# Patient Record
Sex: FEMALE | Race: WHITE | NOT HISPANIC OR LATINO | URBAN - METROPOLITAN AREA
[De-identification: names, ages, dates, MRNs, and addresses within clinical notes are randomized per-mention and may not be internally consistent; named-entity substitution may affect disease eponyms.]

---

## 2018-02-07 ENCOUNTER — INPATIENT (INPATIENT)
Facility: HOSPITAL | Age: 83
LOS: 1 days | Discharge: HOME HEALTH | DRG: 149 | End: 2018-02-09
Attending: INTERNAL MEDICINE | Admitting: INTERNAL MEDICINE
Payer: MEDICARE

## 2018-02-07 VITALS
OXYGEN SATURATION: 98 % | TEMPERATURE: 99 F | HEART RATE: 81 BPM | SYSTOLIC BLOOD PRESSURE: 148 MMHG | RESPIRATION RATE: 19 BRPM | DIASTOLIC BLOOD PRESSURE: 74 MMHG

## 2018-02-07 DIAGNOSIS — R42 DIZZINESS AND GIDDINESS: ICD-10-CM

## 2018-02-07 DIAGNOSIS — Z96.60 PRESENCE OF UNSPECIFIED ORTHOPEDIC JOINT IMPLANT: Chronic | ICD-10-CM

## 2018-02-07 DIAGNOSIS — Z96.652 PRESENCE OF LEFT ARTIFICIAL KNEE JOINT: Chronic | ICD-10-CM

## 2018-02-07 LAB
ALBUMIN SERPL ELPH-MCNC: 3.7 G/DL — SIGNIFICANT CHANGE UP (ref 3.3–5)
ALP SERPL-CCNC: 81 U/L — SIGNIFICANT CHANGE UP (ref 40–120)
ALT FLD-CCNC: 12 U/L RC — SIGNIFICANT CHANGE UP (ref 10–45)
ANION GAP SERPL CALC-SCNC: 11 MMOL/L — SIGNIFICANT CHANGE UP (ref 5–17)
APPEARANCE UR: CLEAR — SIGNIFICANT CHANGE UP
AST SERPL-CCNC: 25 U/L — SIGNIFICANT CHANGE UP (ref 10–40)
BILIRUB SERPL-MCNC: 0.4 MG/DL — SIGNIFICANT CHANGE UP (ref 0.2–1.2)
BILIRUB UR-MCNC: NEGATIVE — SIGNIFICANT CHANGE UP
BUN SERPL-MCNC: 11 MG/DL — SIGNIFICANT CHANGE UP (ref 7–23)
CALCIUM SERPL-MCNC: 8.9 MG/DL — SIGNIFICANT CHANGE UP (ref 8.4–10.5)
CHLORIDE SERPL-SCNC: 102 MMOL/L — SIGNIFICANT CHANGE UP (ref 96–108)
CO2 SERPL-SCNC: 25 MMOL/L — SIGNIFICANT CHANGE UP (ref 22–31)
COLOR SPEC: SIGNIFICANT CHANGE UP
CREAT SERPL-MCNC: 0.67 MG/DL — SIGNIFICANT CHANGE UP (ref 0.5–1.3)
DIFF PNL FLD: NEGATIVE — SIGNIFICANT CHANGE UP
GAS PNL BLDV: SIGNIFICANT CHANGE UP
GLUCOSE SERPL-MCNC: 121 MG/DL — HIGH (ref 70–99)
GLUCOSE UR QL: NEGATIVE — SIGNIFICANT CHANGE UP
HCT VFR BLD CALC: 44 % — SIGNIFICANT CHANGE UP (ref 34.5–45)
HGB BLD-MCNC: 14.8 G/DL — SIGNIFICANT CHANGE UP (ref 11.5–15.5)
KETONES UR-MCNC: NEGATIVE — SIGNIFICANT CHANGE UP
LEUKOCYTE ESTERASE UR-ACNC: NEGATIVE — SIGNIFICANT CHANGE UP
LIDOCAIN IGE QN: 25 U/L — SIGNIFICANT CHANGE UP (ref 7–60)
MCHC RBC-ENTMCNC: 29.3 PG — SIGNIFICANT CHANGE UP (ref 27–34)
MCHC RBC-ENTMCNC: 33.6 GM/DL — SIGNIFICANT CHANGE UP (ref 32–36)
MCV RBC AUTO: 87.2 FL — SIGNIFICANT CHANGE UP (ref 80–100)
NITRITE UR-MCNC: NEGATIVE — SIGNIFICANT CHANGE UP
PH UR: 6 — SIGNIFICANT CHANGE UP (ref 5–8)
PLATELET # BLD AUTO: 174 K/UL — SIGNIFICANT CHANGE UP (ref 150–400)
POTASSIUM SERPL-MCNC: 4.3 MMOL/L — SIGNIFICANT CHANGE UP (ref 3.5–5.3)
POTASSIUM SERPL-SCNC: 4.3 MMOL/L — SIGNIFICANT CHANGE UP (ref 3.5–5.3)
PROT SERPL-MCNC: 7.8 G/DL — SIGNIFICANT CHANGE UP (ref 6–8.3)
PROT UR-MCNC: NEGATIVE — SIGNIFICANT CHANGE UP
RBC # BLD: 5.05 M/UL — SIGNIFICANT CHANGE UP (ref 3.8–5.2)
RBC # FLD: 12.8 % — SIGNIFICANT CHANGE UP (ref 10.3–14.5)
SODIUM SERPL-SCNC: 138 MMOL/L — SIGNIFICANT CHANGE UP (ref 135–145)
SP GR SPEC: 1.01 — LOW (ref 1.01–1.02)
UROBILINOGEN FLD QL: NEGATIVE — SIGNIFICANT CHANGE UP
WBC # BLD: 3.7 K/UL — LOW (ref 3.8–10.5)
WBC # FLD AUTO: 3.7 K/UL — LOW (ref 3.8–10.5)

## 2018-02-07 PROCEDURE — 99223 1ST HOSP IP/OBS HIGH 75: CPT | Mod: AI

## 2018-02-07 PROCEDURE — 99285 EMERGENCY DEPT VISIT HI MDM: CPT | Mod: GC

## 2018-02-07 RX ORDER — ONDANSETRON 8 MG/1
4 TABLET, FILM COATED ORAL ONCE
Qty: 0 | Refills: 0 | Status: COMPLETED | OUTPATIENT
Start: 2018-02-07 | End: 2018-02-07

## 2018-02-07 RX ORDER — LEVOTHYROXINE SODIUM 125 MCG
75 TABLET ORAL DAILY
Qty: 0 | Refills: 0 | Status: DISCONTINUED | OUTPATIENT
Start: 2018-02-07 | End: 2018-02-09

## 2018-02-07 RX ORDER — MECLIZINE HCL 12.5 MG
25 TABLET ORAL THREE TIMES A DAY
Qty: 0 | Refills: 0 | Status: DISCONTINUED | OUTPATIENT
Start: 2018-02-07 | End: 2018-02-09

## 2018-02-07 RX ORDER — SODIUM CHLORIDE 9 MG/ML
1000 INJECTION INTRAMUSCULAR; INTRAVENOUS; SUBCUTANEOUS ONCE
Qty: 0 | Refills: 0 | Status: COMPLETED | OUTPATIENT
Start: 2018-02-07 | End: 2018-02-07

## 2018-02-07 RX ORDER — ACETAMINOPHEN 500 MG
500 TABLET ORAL ONCE
Qty: 0 | Refills: 0 | Status: COMPLETED | OUTPATIENT
Start: 2018-02-07 | End: 2018-02-07

## 2018-02-07 RX ORDER — MECLIZINE HCL 12.5 MG
25 TABLET ORAL ONCE
Qty: 0 | Refills: 0 | Status: COMPLETED | OUTPATIENT
Start: 2018-02-07 | End: 2018-02-07

## 2018-02-07 RX ORDER — HEPARIN SODIUM 5000 [USP'U]/ML
5000 INJECTION INTRAVENOUS; SUBCUTANEOUS EVERY 8 HOURS
Qty: 0 | Refills: 0 | Status: DISCONTINUED | OUTPATIENT
Start: 2018-02-07 | End: 2018-02-09

## 2018-02-07 RX ADMIN — ONDANSETRON 4 MILLIGRAM(S): 8 TABLET, FILM COATED ORAL at 17:53

## 2018-02-07 RX ADMIN — HEPARIN SODIUM 5000 UNIT(S): 5000 INJECTION INTRAVENOUS; SUBCUTANEOUS at 22:47

## 2018-02-07 RX ADMIN — Medication 500 MILLIGRAM(S): at 20:16

## 2018-02-07 RX ADMIN — Medication 25 MILLIGRAM(S): at 22:47

## 2018-02-07 RX ADMIN — Medication 25 MILLIGRAM(S): at 17:53

## 2018-02-07 RX ADMIN — SODIUM CHLORIDE 1000 MILLILITER(S): 9 INJECTION INTRAMUSCULAR; INTRAVENOUS; SUBCUTANEOUS at 18:01

## 2018-02-07 RX ADMIN — Medication 500 MILLIGRAM(S): at 20:46

## 2018-02-07 NOTE — H&P ADULT - NSHPREVIEWOFSYSTEMS_GEN_ALL_CORE
REVIEW OF SYSTEMS:    CONSTITUTIONAL: No weakness, fevers or chills  EYES/ENT: No visual changes;  No vertigo or throat pain   NECK: No pain or stiffness  RESPIRATORY: No cough, wheezing, hemoptysis; No shortness of breath  CARDIOVASCULAR: No chest pain or palpitations  GASTROINTESTINAL: No abdominal or epigastric pain. No nausea, vomiting, or hematemesis; No diarrhea or constipation. No melena or hematochezia.  GENITOURINARY: No dysuria, frequency or hematuria  NEUROLOGICAL: No numbness or weakness  SKIN: No itching, burning, rashes, or lesions   PSYCH: No depression or anxiety  HEME: No swollen lymph nodes REVIEW OF SYSTEMS:    CONSTITUTIONAL: No weakness, fevers or chills  EYES/ENT: No visual changes;  No throat pain, +dizziness  NECK: No pain or stiffness  RESPIRATORY: No cough, wheezing, hemoptysis; No shortness of breath  CARDIOVASCULAR: No chest pain or palpitations  GASTROINTESTINAL: No abdominal or epigastric pain. +nausea, +vomiting, no hematemesis; No diarrhea or constipation. No melena or hematochezia.  GENITOURINARY: No dysuria, frequency or hematuria  NEUROLOGICAL: No numbness or weakness  SKIN: No itching, burning, rashes, or lesions   PSYCH: No depression or anxiety  HEME: No swollen lymph nodes

## 2018-02-07 NOTE — H&P ADULT - PROBLEM SELECTOR PLAN 1
Patient presents for evaluation of dizziness found to have overall unremarkable labs, head CT, and CXR. Given IVF and meclizine with improvement in her symptoms, but still not completely back to baseline. Evaluated by Neurology who felt consistent with vertigo/BPPV and recommended symptomatic care at this time.  - Neurology consulted, appreciate recs  - s/p IVF, continue if unable to tolerate PO  - Meclizine 25mg TID  - Reglan if needed for persistent symptoms  - MRI as per Neuro if not back to baseline symptomatically  - Consider PT consult in AM

## 2018-02-07 NOTE — H&P ADULT - HISTORY OF PRESENT ILLNESS
Danielle Clark is an 82 year old female with a history of hypothyroidism who presents for evaluation of dizziness, nausea, and vomiting.    Patient states that    In the ED, T 99, HR 81, /74, O2 98% on RA. Labs showed no leukocytosis with WBC 3.7, normal hemoglobin of 14.8, and normal platelets 174. Metabolic panel unremarkable with Cr 0.67. LFTs unremarkable. VBG unremarkable with lactate 1.0. CXR with LLL atelectasis and CT head negative for bleed though limited secondary to artifact. Danielle Clark is an 82 year old female with a history of hypothyroidism who presents for evaluation of dizziness, nausea, and vomiting.    Patient states that    In the ED, T 99, HR 81, /74, O2 98% on RA. Labs showed no leukocytosis with WBC 3.7, normal hemoglobin of 14.8, and normal platelets 174. Metabolic panel unremarkable with Cr 0.67. LFTs unremarkable. VBG unremarkable with lactate 1.0. CXR with LLL atelectasis and CT head negative for bleed though limited secondary to artifact.     Patient was given 1L NS, Zofran, and 25mg Meclizine with improvement in symptoms, but still with some dizziness with standing. Able to ambulate, but did not feel well. Neuro consulted by ED and patient admitted to Dr. Crena. Danielle Clark is an 82 year old female with a history of hypothyroidism who presents for evaluation of dizziness, nausea, and vomiting.    Patient states that she has been feeling unwell for the past two days. Lives at home on her own and started having extreme symptoms of dizziness that were persistent throughout the day. Had difficulty with ambulation as she constantly felt as if she were about the fall, but did not suffer any injuries. Did not notice any provoking symptoms or anything that would improve the dizziness. Sat in the recliner with her eyes closed for the most of the day, presented today when she felt that symptoms had been persistent for too long. She denies any fevers, chills, or ringing in her ears but does note some rhinorrhea and sore throat about 1-2 weeks ago. No numbness, tingling, or weakness. One episodes of nausea and emesis this AM after eating. No syncope or lightheadedness.    In the ED, T 99, HR 81, /74, O2 98% on RA. Labs showed no leukocytosis with WBC 3.7, normal hemoglobin of 14.8, and normal platelets 174. Metabolic panel unremarkable with Cr 0.67. LFTs unremarkable. VBG unremarkable with lactate 1.0. CXR with LLL atelectasis and CT head negative for bleed though limited secondary to artifact.     Patient was given 1L NS, Zofran, and 25mg Meclizine with improvement in symptoms, but still with some dizziness with standing. Able to ambulate, but did not feel well. Neuro consulted by ED and patient admitted to Dr. Cerna.

## 2018-02-07 NOTE — ED PROVIDER NOTE - OBJECTIVE STATEMENT
Pt is a 81 yo F pmh hypothyroid, no other medical issues, c/o dizziness over the last 3 days, also with nausea and vomiting. 2 episodes of vomiting this afternoon. Denies any abdominal pain. Pt denies any trauma to the head or falls, not routinely dizzy, ambulates with assistance. Reports she gets more dizzy with movement. Denies any tinnitus, recent illness/ear infections. Denies fever or chills, chest pain, sob, headache, or any other concerns.

## 2018-02-07 NOTE — H&P ADULT - NSHPLABSRESULTS_GEN_ALL_CORE
14.8   3.7   )-----------( 174      ( 2018 19:50 )             44.0       02-    138  |  102  |  11  ----------------------------<  121<H>  4.3   |  25  |  0.67    Ca    8.9      2018 17:59    TPro  7.8  /  Alb  3.7  /  TBili  0.4  /  DBili  x   /  AST  25  /  ALT  12  /  AlkPhos  81  -            Urinalysis Basic - ( 2018 21:00 )    Color: PL Yellow / Appearance: Clear / S.007 / pH: x  Gluc: x / Ketone: Negative  / Bili: Negative / Urobili: Negative   Blood: x / Protein: Negative / Nitrite: Negative   Leuk Esterase: Negative / RBC: x / WBC x   Sq Epi: x / Non Sq Epi: x / Bacteria: x    I personally interpreted this patient's labs. They were notable for no leukocytosis with WBC 3.7, normal hemoglobin of 14.8, and normal platelets 174. Metabolic panel unremarkable with Cr 0.67. LFTs unremarkable. VBG unremarkable with lactate 1.0.  I personally interpreted this patient's imaging. CXR with LLL atelectasis and CT head negative for bleed though limited secondary to artifact.   I personally interpreted this patient's ECG. It showed 14.8   3.7   )-----------( 174      ( 2018 19:50 )             44.0       02-    138  |  102  |  11  ----------------------------<  121<H>  4.3   |  25  |  0.67    Ca    8.9      2018 17:59    TPro  7.8  /  Alb  3.7  /  TBili  0.4  /  DBili  x   /  AST  25  /  ALT  12  /  AlkPhos  81  -            Urinalysis Basic - ( 2018 21:00 )    Color: PL Yellow / Appearance: Clear / S.007 / pH: x  Gluc: x / Ketone: Negative  / Bili: Negative / Urobili: Negative   Blood: x / Protein: Negative / Nitrite: Negative   Leuk Esterase: Negative / RBC: x / WBC x   Sq Epi: x / Non Sq Epi: x / Bacteria: x    I personally interpreted this patient's labs. They were notable for no leukocytosis with WBC 3.7, normal hemoglobin of 14.8, and normal platelets 174. Metabolic panel unremarkable with Cr 0.67. LFTs unremarkable. VBG unremarkable with lactate 1.0.  I personally interpreted this patient's imaging. CXR with LLL atelectasis and CT head negative for bleed though limited secondary to artifact.

## 2018-02-07 NOTE — ED PROVIDER NOTE - ATTENDING CONTRIBUTION TO CARE
Private Physician Cody Kiser  82y female pmh hypothyrodism,  No dm,htn,hld,surg, pt comes to ed complains of "I'm extremely dizzy with the room spinning for past several days"  No falls/trauma, fever and chills,cp,cp,sob,abd pain. Symptoms worse with standing. Can ambulate with assistance. No hx of similar illness. PE WDWN male normocephalic atraumatic chest clear anterior & posterior abd soft +bs neuro gcs15 speech fluent pain power 5/5 all extr  Edouard Ardon MD, Facep

## 2018-02-07 NOTE — H&P ADULT - ASSESSMENT
Danielle Clark is an 82 year old female with a history of hypothyroidism who presents for evaluation of dizziness, nausea, and vomiting found to have likely vertigo/BPPV.

## 2018-02-07 NOTE — H&P ADULT - NSHPPHYSICALEXAM_GEN_ALL_CORE
PHYSICAL EXAM:  GENERAL: NAD, well-groomed, well-developed  HEAD:  Atraumatic, Normocephalic  EYES: EOMI, PERRLA, conjunctiva and sclera clear  ENMT: No tonsillar erythema, exudates, or enlargement; Moist mucous membranes,   NECK: Supple, No JVD, Normal thyroid  HEART: Regular rate and rhythm; No murmurs, rubs, or gallops  RESPIRATORY: CTA B/L, No W/R/R  ABDOMEN: Soft, Nontender, Nondistended; Bowel sounds present  NEUROLOGY: A&Ox3, nonfocal, CN II-XII grossly intact, sensation intact, no gait abnormalities bilaterally;  EXTREMITIES:  2+ Peripheral Pulses, No clubbing, cyanosis, or edema  SKIN: warm, dry, normal color, no rash or abnormal lesions  MSK: No joint effusion  PSYCH: Flat affect PHYSICAL EXAM:  GENERAL: NAD, well-groomed, well-developed  HEAD:  Atraumatic, Normocephalic  EYES: EOMI, PERRLA, conjunctiva and sclera clear  ENMT: No tonsillar erythema, exudates, or enlargement; Moist mucous membranes,   NECK: Supple, No JVD, Normal thyroid  HEART: Regular rate and rhythm; No murmurs, rubs, or gallops  RESPIRATORY: CTA B/L, No W/R/R  ABDOMEN: Soft, Nontender, Nondistended; Bowel sounds present  NEUROLOGY: A&Ox3, nonfocal, CN II-XII grossly intact, sensation intact, normal finger-to-nose, normal heel-to-shin, + horizontal nystagmus   EXTREMITIES:  2+ Peripheral Pulses, No clubbing, cyanosis, or edema  SKIN: warm, dry, normal color, no rash or abnormal lesions  MSK: No joint effusion  PSYCH: Flat affect

## 2018-02-07 NOTE — ED PROVIDER NOTE - MEDICAL DECISION MAKING DETAILS
81 yo f pmh hypothyroid here with dizziness, with associated sx of nausea and vomiting 2-3 days duration. Pt has no recent illness, no fever. Appears well, HD stable, afebrile. Trace expiratory wheezing diffusely. Abd soft non tender. Will test le patel pike, ENT exam, meclizine, CT head. Treat n/v symptoms, zofran, fluids. Labs, xray chest, reassess  Jimena Prince, PGY-1 EM

## 2018-02-07 NOTE — H&P ADULT - PROBLEM SELECTOR PLAN 3
F: s/p 1L NS, additional IVF as needed  E: replete prn  N: regular diet  GI: none  DVT: subq heparin  Code: FULL

## 2018-02-07 NOTE — CONSULT NOTE ADULT - ATTENDING COMMENTS
As above.  Hx of intense vertiginous sensation several days ago with assocd n/v; now improving but still present intermittently.    Head impulse neg, neg skew deviation and no nystagmus.  No other focal CN or motor deficits.  CT head unremarkable by report.    Suspect resolving vestibular neuritis.    Ramanpike deferred as pt c/o cervical arthritic pain.    Would proceed with MRI/ MRA head to r/o post circ stenoses/ischemia.

## 2018-02-07 NOTE — CONSULT NOTE ADULT - SUBJECTIVE AND OBJECTIVE BOX
Neurology Consult Note    Pt is a 82y female pmh w hypothyrodism, Pt comes to ed complains of "I'm extremely dizzy with the room spinning for past several days." Symptoms are worse with standing. Can ambulate with assistance.    No falls/trauma, fever and chills,cp,cp,sob,abd pain.    PMhx as above  Home meds - synthroid 77mcd in the morning before breakfast    Vital Signs Last 24 Hrs  T(C): 37.1 (2018 19:30), Max: 37.2 (2018 16:46)  T(F): 98.7 (2018 19:30), Max: 99 (2018 16:46)  HR: 69 (2018 19:30) (69 - 81)  BP: 148/74 (2018 16:46) (148/74 - 148/74)  BP(mean): --  RR: 18 (2018 19:30) (18 - 19)  SpO2: 97% (2018 19:30) (97% - 98%)    Neuro exam:  MS - AAOx3, naming and repition in tact  CNS - PERRL, EOMI, B/L Horizontal nystagmus, face symmetric  Motor - 5/5 throughout  sensory - light touch in tact throughout  coordination - ftn in tact b/l  gait - symptomatic upon standing (mild) therefore exam deferred    Labs                        14.8   3.7   )-----------( 174      ( 2018 19:50 )             44.0   02-07    138  |  102  |  11  ----------------------------<  121<H>  4.3   |  25  |  0.67    Ca    8.9      2018 17:59    TPro  7.8  /  Alb  3.7  /  TBili  0.4  /  DBili  x   /  AST  25  /  ALT  12  /  AlkPhos  81  02-07  Urinalysis Basic - ( 2018 21:00 )    Color: PL Yellow / Appearance: Clear / S.007 / pH: x  Gluc: x / Ketone: Negative  / Bili: Negative / Urobili: Negative   Blood: x / Protein: Negative / Nitrite: Negative   Leuk Esterase: Negative / RBC: x / WBC x   Sq Epi: x / Non Sq Epi: x / Bacteria: x    CT head - no acute findings

## 2018-02-07 NOTE — CONSULT NOTE ADULT - ASSESSMENT
Pt is a 82y female pmh w hypothyrodism, Pt comes to ed complains of "I'm extremely dizzy with the room spinning for past several days." Symptoms are worse with standing. Neuro exam is non-focal and ct head is unremarkable.    Impression - Vertiginous symptoms worsen with positional change and improved iwth IVF, meclizine and reglan. Horizontal nystagmus present    Dx - BPPV    Reccs;  Symptom control  IVF, Meclizine, reglan PRN  Fall precautions  MRI brain if symptoms persist although pt is already improving.   If pt returns to baseline, does not require inpatient observation.

## 2018-02-07 NOTE — ED PROVIDER NOTE - PROGRESS NOTE DETAILS
patient feeling better, but on attempt to ambulate, feels 'woozy' and dizzy still, unsteady when standing and attempting to work. not drifting to one side. will admit and speak with neuro -Slowey DO Discussed with Dr Eryn Ardon MD, Facep

## 2018-02-08 DIAGNOSIS — Z75.8 OTHER PROBLEMS RELATED TO MEDICAL FACILITIES AND OTHER HEALTH CARE: ICD-10-CM

## 2018-02-08 DIAGNOSIS — R42 DIZZINESS AND GIDDINESS: ICD-10-CM

## 2018-02-08 DIAGNOSIS — E03.9 HYPOTHYROIDISM, UNSPECIFIED: ICD-10-CM

## 2018-02-08 LAB
ANION GAP SERPL CALC-SCNC: 12 MMOL/L — SIGNIFICANT CHANGE UP (ref 5–17)
BASOPHILS # BLD AUTO: 0.01 K/UL — SIGNIFICANT CHANGE UP (ref 0–0.2)
BASOPHILS NFR BLD AUTO: 0.2 % — SIGNIFICANT CHANGE UP (ref 0–2)
BUN SERPL-MCNC: 14 MG/DL — SIGNIFICANT CHANGE UP (ref 7–23)
CALCIUM SERPL-MCNC: 8.6 MG/DL — SIGNIFICANT CHANGE UP (ref 8.4–10.5)
CHLORIDE SERPL-SCNC: 104 MMOL/L — SIGNIFICANT CHANGE UP (ref 96–108)
CO2 SERPL-SCNC: 21 MMOL/L — LOW (ref 22–31)
CREAT SERPL-MCNC: 0.5 MG/DL — SIGNIFICANT CHANGE UP (ref 0.5–1.3)
EOSINOPHIL # BLD AUTO: 0.06 K/UL — SIGNIFICANT CHANGE UP (ref 0–0.5)
EOSINOPHIL NFR BLD AUTO: 1.3 % — SIGNIFICANT CHANGE UP (ref 0–6)
GLUCOSE BLDC GLUCOMTR-MCNC: 90 MG/DL — SIGNIFICANT CHANGE UP (ref 70–99)
GLUCOSE SERPL-MCNC: 89 MG/DL — SIGNIFICANT CHANGE UP (ref 70–99)
HCT VFR BLD CALC: 42 % — SIGNIFICANT CHANGE UP (ref 34.5–45)
HGB BLD-MCNC: 13.6 G/DL — SIGNIFICANT CHANGE UP (ref 11.5–15.5)
IMM GRANULOCYTES NFR BLD AUTO: 0 % — SIGNIFICANT CHANGE UP (ref 0–1.5)
LYMPHOCYTES # BLD AUTO: 1.81 K/UL — SIGNIFICANT CHANGE UP (ref 1–3.3)
LYMPHOCYTES # BLD AUTO: 40.4 % — SIGNIFICANT CHANGE UP (ref 13–44)
MAGNESIUM SERPL-MCNC: 2.5 MG/DL — SIGNIFICANT CHANGE UP (ref 1.6–2.6)
MCHC RBC-ENTMCNC: 27.8 PG — SIGNIFICANT CHANGE UP (ref 27–34)
MCHC RBC-ENTMCNC: 32.4 GM/DL — SIGNIFICANT CHANGE UP (ref 32–36)
MCV RBC AUTO: 85.7 FL — SIGNIFICANT CHANGE UP (ref 80–100)
MONOCYTES # BLD AUTO: 0.7 K/UL — SIGNIFICANT CHANGE UP (ref 0–0.9)
MONOCYTES NFR BLD AUTO: 15.6 % — HIGH (ref 2–14)
NEUTROPHILS # BLD AUTO: 1.9 K/UL — SIGNIFICANT CHANGE UP (ref 1.8–7.4)
NEUTROPHILS NFR BLD AUTO: 42.5 % — LOW (ref 43–77)
PLATELET # BLD AUTO: 185 K/UL — SIGNIFICANT CHANGE UP (ref 150–400)
POTASSIUM SERPL-MCNC: 4.4 MMOL/L — SIGNIFICANT CHANGE UP (ref 3.5–5.3)
POTASSIUM SERPL-SCNC: 4.4 MMOL/L — SIGNIFICANT CHANGE UP (ref 3.5–5.3)
RBC # BLD: 4.9 M/UL — SIGNIFICANT CHANGE UP (ref 3.8–5.2)
RBC # FLD: 14.9 % — HIGH (ref 10.3–14.5)
SODIUM SERPL-SCNC: 137 MMOL/L — SIGNIFICANT CHANGE UP (ref 135–145)
WBC # BLD: 4.48 K/UL — SIGNIFICANT CHANGE UP (ref 3.8–10.5)
WBC # FLD AUTO: 4.48 K/UL — SIGNIFICANT CHANGE UP (ref 3.8–10.5)

## 2018-02-08 RX ORDER — ACETAMINOPHEN 500 MG
650 TABLET ORAL ONCE
Qty: 0 | Refills: 0 | Status: COMPLETED | OUTPATIENT
Start: 2018-02-08 | End: 2018-02-08

## 2018-02-08 RX ADMIN — HEPARIN SODIUM 5000 UNIT(S): 5000 INJECTION INTRAVENOUS; SUBCUTANEOUS at 21:19

## 2018-02-08 RX ADMIN — HEPARIN SODIUM 5000 UNIT(S): 5000 INJECTION INTRAVENOUS; SUBCUTANEOUS at 14:01

## 2018-02-08 RX ADMIN — Medication 650 MILLIGRAM(S): at 03:08

## 2018-02-08 RX ADMIN — Medication 650 MILLIGRAM(S): at 03:38

## 2018-02-08 RX ADMIN — Medication 25 MILLIGRAM(S): at 14:01

## 2018-02-08 RX ADMIN — HEPARIN SODIUM 5000 UNIT(S): 5000 INJECTION INTRAVENOUS; SUBCUTANEOUS at 04:53

## 2018-02-08 RX ADMIN — Medication 650 MILLIGRAM(S): at 20:44

## 2018-02-08 RX ADMIN — Medication 25 MILLIGRAM(S): at 04:53

## 2018-02-08 RX ADMIN — Medication 650 MILLIGRAM(S): at 19:44

## 2018-02-08 RX ADMIN — Medication 25 MILLIGRAM(S): at 21:19

## 2018-02-08 NOTE — PROGRESS NOTE ADULT - ASSESSMENT
Pt is a 82y female pmh w hypothyrodism,    complains of "I'm extremely dizzy with the room spinning for past several days."   Neuro exam is non-focal and ct head is unremarkable.    pt  with - Vertigo,   worsens  with positional change and improved iwth IVF, meclizine and reglan. Horizontal nystagmus present    IVF, Meclizine, reglan PRN  Fall precautions.   labs pending

## 2018-02-08 NOTE — PROGRESS NOTE ADULT - SUBJECTIVE AND OBJECTIVE BOX
c/o  dizziness   no cp/sob/abd pain    MEDICATIONS  (STANDING):  heparin  Injectable 5000 Unit(s) SubCutaneous every 8 hours  levothyroxine 75 MICROGram(s) Oral daily  meclizine 25 milliGRAM(s) Oral three times a day    MEDICATIONS  (PRN):      Vital Signs Last 24 Hrs  T(C): 36.7 (2018 08:08), Max: 37.2 (2018 16:46)  T(F): 98.1 (2018 08:08), Max: 99 (2018 16:46)  HR: 59 (2018 08:08) (59 - 81)  BP: 114/66 (2018 08:08) (108/70 - 148/74)  BP(mean): --  RR: 18 (2018 08:08) (18 - 19)  SpO2: 94% (2018 08:08) (94% - 98%)  CAPILLARY BLOOD GLUCOSE        I&O's Summary      PHYSICAL EXAM:  HEAD:  Atraumatic, Normocephalic  NECK: Supple, No JVD  CHEST/LUNG: Clear to auscultation bilaterally; No wheeze  HEART: Regular rate and rhythm;           murmur  ABDOMEN: Soft, Nontender, ;   EXTREMITIES:              edema  NEUROLOGY:  alert    LABS:                        13.6   4.48  )-----------( 185      ( 2018 07:34 )             42.0     02-08    137  |  104  |  14  ----------------------------<  89  4.4   |  21<L>  |  0.50    Ca    8.6      2018 07:43  Mg     2.5     -    TPro  7.8  /  Alb  3.7  /  TBili  0.4  /  DBili  x   /  AST  25  /  ALT  12  /  AlkPhos  81  02-          Urinalysis Basic - ( 2018 21:00 )    Color: PL Yellow / Appearance: Clear / S.007 / pH: x  Gluc: x / Ketone: Negative  / Bili: Negative / Urobili: Negative   Blood: x / Protein: Negative / Nitrite: Negative   Leuk Esterase: Negative / RBC: x / WBC x   Sq Epi: x / Non Sq Epi: x / Bacteria: x           @ 17:59  4.3  22          Consultant(s) Notes Reviewed:      Care Discussed with Consultants/Other Providers:

## 2018-02-09 ENCOUNTER — TRANSCRIPTION ENCOUNTER (OUTPATIENT)
Age: 83
End: 2018-02-09

## 2018-02-09 VITALS
TEMPERATURE: 98 F | OXYGEN SATURATION: 95 % | RESPIRATION RATE: 18 BRPM | HEART RATE: 58 BPM | SYSTOLIC BLOOD PRESSURE: 112 MMHG | DIASTOLIC BLOOD PRESSURE: 68 MMHG

## 2018-02-09 PROCEDURE — 85014 HEMATOCRIT: CPT

## 2018-02-09 PROCEDURE — 81003 URINALYSIS AUTO W/O SCOPE: CPT

## 2018-02-09 PROCEDURE — 80048 BASIC METABOLIC PNL TOTAL CA: CPT

## 2018-02-09 PROCEDURE — 82435 ASSAY OF BLOOD CHLORIDE: CPT

## 2018-02-09 PROCEDURE — 80053 COMPREHEN METABOLIC PANEL: CPT

## 2018-02-09 PROCEDURE — 71046 X-RAY EXAM CHEST 2 VIEWS: CPT

## 2018-02-09 PROCEDURE — 82330 ASSAY OF CALCIUM: CPT

## 2018-02-09 PROCEDURE — 82803 BLOOD GASES ANY COMBINATION: CPT

## 2018-02-09 PROCEDURE — 85027 COMPLETE CBC AUTOMATED: CPT

## 2018-02-09 PROCEDURE — 84132 ASSAY OF SERUM POTASSIUM: CPT

## 2018-02-09 PROCEDURE — 71045 X-RAY EXAM CHEST 1 VIEW: CPT

## 2018-02-09 PROCEDURE — 83605 ASSAY OF LACTIC ACID: CPT

## 2018-02-09 PROCEDURE — 82962 GLUCOSE BLOOD TEST: CPT

## 2018-02-09 PROCEDURE — 83690 ASSAY OF LIPASE: CPT

## 2018-02-09 PROCEDURE — 82947 ASSAY GLUCOSE BLOOD QUANT: CPT

## 2018-02-09 PROCEDURE — 97161 PT EVAL LOW COMPLEX 20 MIN: CPT

## 2018-02-09 PROCEDURE — 99285 EMERGENCY DEPT VISIT HI MDM: CPT | Mod: 25

## 2018-02-09 PROCEDURE — 70450 CT HEAD/BRAIN W/O DYE: CPT

## 2018-02-09 PROCEDURE — 83735 ASSAY OF MAGNESIUM: CPT

## 2018-02-09 PROCEDURE — 84295 ASSAY OF SERUM SODIUM: CPT

## 2018-02-09 RX ORDER — MECLIZINE HCL 12.5 MG
1 TABLET ORAL
Qty: 30 | Refills: 0
Start: 2018-02-09 | End: 2018-02-18

## 2018-02-09 RX ADMIN — HEPARIN SODIUM 5000 UNIT(S): 5000 INJECTION INTRAVENOUS; SUBCUTANEOUS at 05:18

## 2018-02-09 RX ADMIN — Medication 75 MICROGRAM(S): at 05:18

## 2018-02-09 RX ADMIN — Medication 25 MILLIGRAM(S): at 05:18

## 2018-02-09 NOTE — DISCHARGE NOTE ADULT - MEDICATION SUMMARY - MEDICATIONS TO TAKE
I will START or STAY ON the medications listed below when I get home from the hospital:    meclizine 25 mg oral tablet  -- 1 tab(s) by mouth 3 times a day, As Needed -for dizziness   -- Indication: For Vertigo    Synthroid 75 mcg (0.075 mg) oral tablet  -- 1 tab(s) by mouth once a day  -- Indication: For Hypothyroidism, unspecified type

## 2018-02-09 NOTE — PROGRESS NOTE ADULT - ASSESSMENT
Pt is a 82y female pmh w hypothyrodism,    complains of "I'm extremely dizzy with the room spinning for past several days."   Neuro exam is non-focal and ct head is unremarkable.    pt  with - Vertigo,   worsens  with positional change and improved iwth IVF, meclizine and reglan. Horizontal nystagmus present    IVF, Meclizine, reglan PRN  Fall precaution  awaiting pt  eval  and then  dc after

## 2018-02-09 NOTE — PHYSICAL THERAPY INITIAL EVALUATION ADULT - ADDITIONAL COMMENTS
Pt lives alone in an apartment building with ramp access, no stairs to negotiate. Pt reports she was ambulating independently with use of straight cane and was independent with all ADL's prior. Pt reports she will be starting with HHA services after D/C.

## 2018-02-09 NOTE — DISCHARGE NOTE ADULT - HOME CARE AGENCY
[facility]. Nurse and Physical therapist to arrange visit within 24 hour after discharge from the hospital. [phone]

## 2018-02-09 NOTE — DISCHARGE NOTE ADULT - CARE PLAN
Principal Discharge DX:	Vertigo  Goal:	resolved  Assessment and plan of treatment:	Follow up with neurology out patient  Secondary Diagnosis:	Hypothyroidism, unspecified type

## 2018-02-09 NOTE — PHYSICAL THERAPY INITIAL EVALUATION ADULT - PERTINENT HX OF CURRENT PROBLEM, REHAB EVAL
82 year old F PMH hypothyroidism who pw dizziness, nausea, and vomiting. Pt reports symptoms of dizziness that were persistent throughout the day. Had difficulty with ambulation as she constantly felt as if she were about the fall, but did not suffer any injuries. CXr: +LLL atelectasis

## 2018-02-09 NOTE — PROGRESS NOTE ADULT - SUBJECTIVE AND OBJECTIVE BOX
no  cp/sob/  dizziness  awaiting pt    MEDICATIONS  (STANDING):  heparin  Injectable 5000 Unit(s) SubCutaneous every 8 hours  levothyroxine 75 MICROGram(s) Oral daily  meclizine 25 milliGRAM(s) Oral three times a day    MEDICATIONS  (PRN):      Vital Signs Last 24 Hrs  T(C): 36.7 (2018 07:46), Max: 36.8 (2018 14:45)  T(F): 98 (2018 07:46), Max: 98.3 (2018 14:45)  HR: 58 (2018 07:46) (58 - 86)  BP: 112/68 (2018 07:46) (103/62 - 116/69)  BP(mean): --  RR: 18 (2018 07:46) (18 - 18)  SpO2: 95% (2018 07:46) (94% - 97%)  CAPILLARY BLOOD GLUCOSE      POCT Blood Glucose.: 90 mg/dL (2018 21:54)    I&O's Summary    2018 07:01  -  2018 07:00  --------------------------------------------------------  IN: 120 mL / OUT: 0 mL / NET: 120 mL        PHYSICAL EXAM:  HEAD:  Atraumatic, Normocephalic  NECK: Supple, No JVD  CHEST/LUNG:  [ ] yes  [ ] no  rales,     [ ]  yes  [ ]  no,  rhonchi  HEART: Regular rate and rhythm;     [ ] yes      murmur  ABDOMEN: Soft, Nontender, ;   EXTREMITIES:    [ ] yes ,  [ ] no ,       edema  NEUROLOGY:  alert    LABS:                        13.6   4.48  )-----------( 185      ( 2018 07:34 )             42.0     02-08    137  |  104  |  14  ----------------------------<  89  4.4   |  21<L>  |  0.50    Ca    8.6      2018 07:43  Mg     2.5     02-08    TPro  7.8  /  Alb  3.7  /  TBili  0.4  /  DBili  x   /  AST  25  /  ALT  12  /  AlkPhos  81            Urinalysis Basic - ( 2018 21:00 )    Color: PL Yellow / Appearance: Clear / S.007 / pH: x  Gluc: x / Ketone: Negative  / Bili: Negative / Urobili: Negative   Blood: x / Protein: Negative / Nitrite: Negative   Leuk Esterase: Negative / RBC: x / WBC x   Sq Epi: x / Non Sq Epi: x / Bacteria: x           @ 17:59  4.3  22              Consultant(s) Notes Reviewed:      Care Discussed with Consultants/Other Providers:

## 2018-02-09 NOTE — PHYSICAL THERAPY INITIAL EVALUATION ADULT - GENERAL OBSERVATIONS, REHAB EVAL
Pt rec'd sitting in bedside chair in NAD, VSS and monitored t/o session, +niece present at b/s, agreeable to PT

## 2018-02-09 NOTE — DISCHARGE NOTE ADULT - HOSPITAL COURSE
Danielle Clark is an 82 year old female with a history of hypothyroidism who presents for evaluation of dizziness, nausea, and vomiting and admitted with vertigo. Neurology consulted, ct head negative.

## 2018-02-09 NOTE — DISCHARGE NOTE ADULT - PATIENT PORTAL LINK FT
You can access the [facility] Patient Portal, offered by [facility], by registering with the following website: [email or web address]

## 2018-02-09 NOTE — PHYSICAL THERAPY INITIAL EVALUATION ADULT - PRECAUTIONS/LIMITATIONS, REHAB EVAL
CT head (-) for bleed though limited secondary to artifact. In ED pt given 1L NS, Zofran, and 25mg Meclizine with improvement in symptoms. Pt found with vertigo, worsens with positional changes +horizontal nystagmus present in supine. Given reglan PRN/fall precautions

## 2018-02-13 RX ORDER — AZELASTINE 137 UG/1
2 SPRAY, METERED NASAL
Qty: 0 | Refills: 0 | COMMUNITY

## 2018-02-13 RX ORDER — FLUTICASONE PROPIONATE 50 MCG
1 SPRAY, SUSPENSION NASAL
Qty: 0 | Refills: 0 | COMMUNITY

## 2018-02-13 RX ORDER — MENTHOL AND METHYL SALICYLATE 10; 30 G/100G; G/100G
0 STICK TOPICAL
Qty: 0 | Refills: 0 | COMMUNITY

## 2018-04-11 ENCOUNTER — APPOINTMENT (OUTPATIENT)
Dept: MRI IMAGING | Facility: CLINIC | Age: 83
End: 2018-04-11

## 2020-03-04 ENCOUNTER — EMERGENCY (EMERGENCY)
Facility: HOSPITAL | Age: 85
LOS: 1 days | Discharge: HOME | End: 2020-03-04
Attending: EMERGENCY MEDICINE
Payer: MEDICARE

## 2020-03-04 VITALS
OXYGEN SATURATION: 97 % | RESPIRATION RATE: 16 BRPM | SYSTOLIC BLOOD PRESSURE: 141 MMHG | HEART RATE: 90 BPM | DIASTOLIC BLOOD PRESSURE: 86 MMHG | TEMPERATURE: 97 F

## 2020-03-04 DIAGNOSIS — Z96.60 PRESENCE OF UNSPECIFIED ORTHOPEDIC JOINT IMPLANT: Chronic | ICD-10-CM

## 2020-03-04 DIAGNOSIS — Z96.652 PRESENCE OF LEFT ARTIFICIAL KNEE JOINT: Chronic | ICD-10-CM

## 2020-03-04 PROCEDURE — 99284 EMERGENCY DEPT VISIT MOD MDM: CPT | Mod: 25

## 2020-03-04 PROCEDURE — 73130 X-RAY EXAM OF HAND: CPT | Mod: 26,LT

## 2020-03-04 PROCEDURE — 73110 X-RAY EXAM OF WRIST: CPT | Mod: 26,LT

## 2020-03-04 PROCEDURE — 21310: CPT

## 2020-03-04 PROCEDURE — 72125 CT NECK SPINE W/O DYE: CPT | Mod: 26

## 2020-03-04 PROCEDURE — 70486 CT MAXILLOFACIAL W/O DYE: CPT | Mod: 26

## 2020-03-04 PROCEDURE — 70450 CT HEAD/BRAIN W/O DYE: CPT | Mod: 26

## 2020-03-04 PROCEDURE — 76377 3D RENDER W/INTRP POSTPROCES: CPT | Mod: 26

## 2020-03-04 RX ORDER — ACETAMINOPHEN 500 MG
975 TABLET ORAL ONCE
Refills: 0 | Status: COMPLETED | OUTPATIENT
Start: 2020-03-04 | End: 2020-03-05

## 2020-03-04 RX ORDER — LIDOCAINE 4 G/100G
1 CREAM TOPICAL ONCE
Refills: 0 | Status: COMPLETED | OUTPATIENT
Start: 2020-03-04 | End: 2020-03-04

## 2020-03-04 RX ORDER — POTASSIUM CHLORIDE 20 MEQ
40 PACKET (EA) ORAL ONCE
Refills: 0 | Status: DISCONTINUED | OUTPATIENT
Start: 2020-03-04 | End: 2020-03-04

## 2020-03-04 RX ORDER — CYCLOBENZAPRINE HYDROCHLORIDE 10 MG/1
10 TABLET, FILM COATED ORAL ONCE
Refills: 0 | Status: DISCONTINUED | OUTPATIENT
Start: 2020-03-04 | End: 2020-03-04

## 2020-03-04 NOTE — ED PROVIDER NOTE - OBJECTIVE STATEMENT
84 y.o. female coming in with head pain after a mechanical trip and fall.  Was walking out of her elevator a couple hours ago, fel forward and struck her head.  NO LOC, no blood thinners.  Has been able to walk without issue.  NO headaches, visual changes, numbness, or weakness.  No neck or back pain.  + left hand pain.  All pain worse with palpation better with rest.  Has not taken anything for the pain.  Last tetanus Sept 2019

## 2020-03-04 NOTE — ED PROVIDER NOTE - NSFOLLOWUPINSTRUCTIONS_ED_ALL_ED_FT
1- Tylenol / Motrin as needed for pain  2- Wash your face gently with soap and water,  Any sign of infection such as redness, pus, fevers, swelling, or any other concerns come back to the ER immediately  3- Try and avoid blowing your nose, if you have to sneeze do it through your mouth  4- Follow up with Plastic Surgery Dr Burnett regarding your nose and your hand.  If you have any new or worsening symptoms come back to the ER immediately

## 2020-03-04 NOTE — ED PROVIDER NOTE - PATIENT PORTAL LINK FT
You can access the FollowMyHealth Patient Portal offered by [facility] by registering at the following website: [email or web address]. By joining [address]’s FollowMyHealth portal, you will also be able to view your health information using other applications (apps) compatible with our system.

## 2020-03-04 NOTE — ED PROVIDER NOTE - ATTENDING CONTRIBUTION TO CARE
MD Ania -- I have reviewed this note, the presenting symptoms, and the Chief Complaint and the History of Present Illness as documented, with the other care provider(s) and nurses on the patient care team. I have also reviewed this patient's past medical/surgical history and social/family history as reviewed and listed in this electronic medical record and agree with the above except as noted below:     HPI -- Mechanical fall while getting out of an elevator, no loc, ambulatory afterwards, no loc +head/facial trauma, no paresthesias or motor weakness.  Occurred ~2 hrs ago.  Not on AC.  No n/v.  No vision changes.  Has baseline LBP that is somewhat worsened, nonradiating.  Ambulatory after fall.  No neck pain.  No chest pain.  No SOB/palps/dizziness.  No abd pain.  +L hand pain/ecchymosis.  PMH -- Hypothyroidism, GERD  PSH --   Allergies -- vancomycin  ROS -- fall, facial pain, HA  PE -- Trauma exam  	  GCS 15; no respiratory distress  A -- patent, phonating, no stridor, protecting airway  B -- CTA B/L, no flail segment, crepitation or tracheal deviation  C -- +2 rad pulse b/l, +2 DP b/l.  HR -- 90 , BP -- 141/86  D -- Not obviously intoxicated, moving all extremity, no obvious focal deficits; follows commands    Secondary survey -- HEENT: +abrasions/ecchymosis/hematoma to bridge of nose and forehead with associated tenderness to palpation though no stepoff/crepitation.  No septal hematoma.  No stepoffs appreciated throughout. no luxated teeth, no jaw malocclusion, no crepitation, no obvious scalp lacerations/hematomas, no orellana/raccoon.  MMM, no jugular venous distension, supple neck, PERRLA, EOMI, nonicteric sclera, midline trachea; PULM: CTA B, no crackles/rubs/rales; CV: tachy, reg rhythm, S1S2, no MRG; ABD: Flat abdomen, NTND, no R/G/R, no CVAT.  MSK:  No C/T/L midline tenderness to palpation or stepoff.  LOWE without obvious limitation.  L hand -- dorsal ecchymosis/hematoma over mid/proximal 2-4 MCs with stepoff and tenderness to palpation at 2-3rd MCs.  No significant snuffbox tenderness to palpation though overall wrist range of motion is somewhat reduced 2/2 pain.  Compartments are soft throughout, +2 pulses x4;  NEURO: No obvious focal deficits; gross motor 5/5 and symmetric in b/l UE/LE, no sensory deficits in b/l UE/LE; PSYCH: AAOx3, no obvious intoxication, clear thought and normal sensorium.   MDM -- Mechanical fall c facial abrasions and swelling over the bridge of nose in elderly woman.  Not on ac.  AAOx4.  Primary survey WNL.  Needs CTs max face/head, XR L hand, pain Rx, reassess.

## 2020-03-04 NOTE — ED PROVIDER NOTE - CARE PROVIDER_API CALL
Lisa Burnett)  Plastic Surgery; Surgery  [address], [address]  [place] [address]  Phone: [phone]  Fax: [phone]  Follow Up Time:

## 2020-03-04 NOTE — ED ADULT NURSE NOTE - OBJECTIVE STATEMENT
85 yo female from home A&OX4 BIB EMS s/p fall at home. st tripped while coming out of elevator, hit face and landed on wrist. Neg LOC. not on blood thinners. Pt has swelling/abrasions to forehead and nose, bruising and swelling to left wrist. Pt c/o left wrist, face and lower back pn. able to ambulate. PERRL. Pt refusing pn medication. imaging pending.

## 2020-03-04 NOTE — ED PROVIDER NOTE - PSH
History of bilateral hip replacements    No significant past surgical history    Status post left knee replacement

## 2020-03-04 NOTE — ED PROVIDER NOTE - PMH
Arthritis    Former smoker, stopped smoking many years ago    Hernia, hiatal    Hypothyroid    Hypothyroidism, unspecified type    Spondylitis    Stomach ulcer

## 2020-03-04 NOTE — ED PROVIDER NOTE - CARE PLAN
Principal Discharge DX:	Facial fracture due to fall, closed, initial encounter  Secondary Diagnosis:	Closed fracture of nasal bone, initial encounter  Secondary Diagnosis:	Abrasion  Secondary Diagnosis:	Contusion of left hand, initial encounter

## 2020-03-04 NOTE — ED PROVIDER NOTE - PROGRESS NOTE DETAILS
Patient unable to lie flat for CT 2/2 low back pain (states is chronic).  Still without midline L-spine tenderness to palpation or stepoff, no LE motor weakness/paresthesias.  Abd soft NTND.  Will tx pain and reassess.  --BMM Able to tolerate CT.  Declined pain Rx.  Will c/t monitor.  --BMM Improved pain; CTs demonstrate R maxilla fx +R and probable L nasal fx.  Pending plastic surgery recs.  Patient is able to ambulate in the ED with her cane (at baseline).  She is concerned about her ability to walk from the car to her apartment building, states very uneven steps, believes she could fall.  She is also now complaining of L foot pain with ambulation that was not present prior; she has mild tenderness to palpation on navicular/midfoot dorsum without significant swelling or stepoff.  Will check XR foot, order nonemergent ambulette given fall risk.  Patient states she is comfortable ambulating while at home, does not wish to be admitted for PT/SW in AM (lives alone), does not feel uncomfortable with performing ADLs, states only has reservations about ambulating to her apartment tonight.  Will plan to DC assuming normal imaging of foot.  --JF

## 2020-03-04 NOTE — ED PROVIDER NOTE - MUSCULOSKELETAL, MLM
Chest and pelvis NT.  No midline spinal tenderness.  mild tenderness over left hand, no snuff box tenderness.  b/l UE and LE full ROM and NT throughout save for left hand findings already mentioned

## 2020-03-05 VITALS
RESPIRATION RATE: 18 BRPM | HEART RATE: 106 BPM | SYSTOLIC BLOOD PRESSURE: 120 MMHG | OXYGEN SATURATION: 97 % | DIASTOLIC BLOOD PRESSURE: 68 MMHG

## 2020-03-05 PROBLEM — E03.9 HYPOTHYROIDISM, UNSPECIFIED: Chronic | Status: ACTIVE | Noted: 2018-02-08

## 2020-03-05 PROCEDURE — 21310: CPT

## 2020-03-05 PROCEDURE — 76377 3D RENDER W/INTRP POSTPROCES: CPT

## 2020-03-05 PROCEDURE — 70450 CT HEAD/BRAIN W/O DYE: CPT

## 2020-03-05 PROCEDURE — 73110 X-RAY EXAM OF WRIST: CPT

## 2020-03-05 PROCEDURE — 73630 X-RAY EXAM OF FOOT: CPT | Mod: 26,LT

## 2020-03-05 PROCEDURE — 70486 CT MAXILLOFACIAL W/O DYE: CPT

## 2020-03-05 PROCEDURE — 73130 X-RAY EXAM OF HAND: CPT

## 2020-03-05 PROCEDURE — 73630 X-RAY EXAM OF FOOT: CPT

## 2020-03-05 PROCEDURE — 99284 EMERGENCY DEPT VISIT MOD MDM: CPT | Mod: 25

## 2020-03-05 PROCEDURE — 72125 CT NECK SPINE W/O DYE: CPT

## 2020-03-05 RX ADMIN — Medication 975 MILLIGRAM(S): at 01:06

## 2020-03-05 RX ADMIN — Medication 975 MILLIGRAM(S): at 01:04

## 2020-03-05 NOTE — CONSULT NOTE ADULT - SUBJECTIVE AND OBJECTIVE BOX
Plastic Surgery Consult Note  (pg [facility]: [id number], [facility]: [phone])    HPI:  84F hx of hypothyroidism and arthritis s/p fall x hours ago p/w R maxilla fx and R/L nasal bone fx. Soft tissue swelling R frontal bone area. Notes mild pain at bridge of nose and R forehead at area of soft tissue swelling. Denies headaches, difficulty breathing, nose bleeds, vision changes.    PAST MEDICAL & SURGICAL HISTORY:  Hypothyroidism, unspecified type  Former smoker, stopped smoking many years ago  Spondylitis  Hernia, hiatal  Stomach ulcer  Arthritis  Hypothyroid  No significant past surgical history  Status post left knee replacement  History of bilateral hip replacements    Allergies    vancomycin (Blisters)    Intolerances      Home Medications:  cranberry oral capsule:  orally once a day (19 May 2015 02:44)  cyanocobalamin 1000 mcg oral tablet: 2 tab(s) orally once a day (20 May 2015 15:47)  levothyroxine 88 mcg (0.088 mg) oral tablet: 1 tab(s) orally once a day (20 May 2015 15:47)  pantoprazole 40 mg oral delayed release tablet: 1 tab(s) orally once a day (before a meal) (20 May 2015 15:47)  Synthroid 75 mcg (0.075 mg) oral tablet: 1 tab(s) orally once a day (13 Feb 2018 10:51)    MEDICATIONS  (STANDING):      SOCIAL HISTORY:  FAMILY HISTORY:  No pertinent family history in first degree relatives  No pertinent family history in first degree relatives      ___________________________________________  OBJECTIVE:  Vital Signs Last 24 Hrs  T(C): 36.1 (04 Mar 2020 20:20), Max: 36.1 (04 Mar 2020 20:20)  T(F): 97 (04 Mar 2020 20:20), Max: 97 (04 Mar 2020 20:20)  HR: 106 (05 Mar 2020 00:31) (90 - 106)  BP: 120/68 (05 Mar 2020 00:31) (120/68 - 141/86)  BP(mean): --  RR: 18 (05 Mar 2020 00:31) (16 - 18)  SpO2: 97% (05 Mar 2020 00:31) (97% - 97%)CAPILLARY BLOOD GLUCOSE        I&O's Detail      EXAM:  General: Well developed, well nourished, NAD  Neuro: Alert and oriented, no focal deficits, moves all extremities spontaneously  HEENT: EOMI CN II-XII intact (mild hearing loss bilaterally unchanged compared to previous to fall)  R frontal area soft tissue swelling mildly TTP with ecchymoses  +crepitus appreciated nasal bone, no crepitus over R maxilla  ecchymoses over nose and b/l maxilla area  Respiratory: Airway patent, respirations unlabored  CVS: appears well perfused  ____________________________________________  RADIOLOGY:  CT H/maxface/C-spine 3/4:    IMPRESSION:   CT HEAD: No CT evidence of acute intracranial hemorrhage, extra-axial   collection, mass effect or calvarial fracture.     CT MAXILLOFACIAL BONES:   1. Mildly displaced fracture of the frontal process of right maxilla and   probable nondisplaced fracture of the right nasal bone.   2. Mildly comminuted and displaced fracture of the left nasal bone.     CT CERVICAL SPINE: No CT evidence of cervical spine fracture or traumatic   malalignment.

## 2020-03-05 NOTE — CONSULT NOTE ADULT - ASSESSMENT
84F s/p fall found to have mildly displaced R maxilla fx and fx of R/L nasal bones.    Recommendations:  -sinus precautions (no blowing nose for at least 2 weeks, sneeze with mouth open, do not drink from straw for at least a week, avoid swimming)  -no antibiotics needed  -f/u as outpatient with plastic surgery (can follow up with Dr. Trish Mac) in 1-2 weeks  -no plastic surgery contraindication to discharge    Discussed with Dr. Nazario Tadeo ([id number])    PLASTIC SURGERY  (pg [facility]: [id number], [facility]: [phone])

## 2020-11-15 ENCOUNTER — TRANSCRIPTION ENCOUNTER (OUTPATIENT)
Age: 85
End: 2020-11-15

## 2021-01-09 ENCOUNTER — INPATIENT (INPATIENT)
Facility: HOSPITAL | Age: 86
LOS: 2 days | Discharge: NURSING, REHAB OR LONG-TERM CARE | DRG: 512 | End: 2021-01-12
Attending: SPECIALIST | Admitting: SPECIALIST
Payer: MEDICARE

## 2021-01-09 VITALS
WEIGHT: 149.91 LBS | SYSTOLIC BLOOD PRESSURE: 134 MMHG | DIASTOLIC BLOOD PRESSURE: 80 MMHG | TEMPERATURE: 98 F | RESPIRATION RATE: 18 BRPM | OXYGEN SATURATION: 97 % | HEIGHT: 63 IN | HEART RATE: 102 BPM

## 2021-01-09 DIAGNOSIS — S52.022A DISPLACED FRACTURE OF OLECRANON PROCESS WITHOUT INTRAARTICULAR EXTENSION OF LEFT ULNA, INITIAL ENCOUNTER FOR CLOSED FRACTURE: ICD-10-CM

## 2021-01-09 DIAGNOSIS — E03.9 HYPOTHYROIDISM, UNSPECIFIED: ICD-10-CM

## 2021-01-09 DIAGNOSIS — R52 PAIN, UNSPECIFIED: ICD-10-CM

## 2021-01-09 DIAGNOSIS — Z96.652 PRESENCE OF LEFT ARTIFICIAL KNEE JOINT: Chronic | ICD-10-CM

## 2021-01-09 DIAGNOSIS — Z96.60 PRESENCE OF UNSPECIFIED ORTHOPEDIC JOINT IMPLANT: Chronic | ICD-10-CM

## 2021-01-09 DIAGNOSIS — S20.222A CONTUSION OF LEFT BACK WALL OF THORAX, INITIAL ENCOUNTER: ICD-10-CM

## 2021-01-09 LAB
ALBUMIN SERPL ELPH-MCNC: 4 G/DL — SIGNIFICANT CHANGE UP (ref 3.3–5)
ALP SERPL-CCNC: 94 U/L — SIGNIFICANT CHANGE UP (ref 40–120)
ALT FLD-CCNC: 15 U/L — SIGNIFICANT CHANGE UP (ref 10–45)
ANION GAP SERPL CALC-SCNC: 17 MMOL/L — SIGNIFICANT CHANGE UP (ref 5–17)
APPEARANCE UR: CLEAR — SIGNIFICANT CHANGE UP
APTT BLD: 31.4 SEC — SIGNIFICANT CHANGE UP (ref 27.5–35.5)
AST SERPL-CCNC: 41 U/L — HIGH (ref 10–40)
BACTERIA # UR AUTO: 0 — SIGNIFICANT CHANGE UP
BASOPHILS # BLD AUTO: 0.01 K/UL — SIGNIFICANT CHANGE UP (ref 0–0.2)
BASOPHILS NFR BLD AUTO: 0.1 % — SIGNIFICANT CHANGE UP (ref 0–2)
BILIRUB SERPL-MCNC: 1.1 MG/DL — SIGNIFICANT CHANGE UP (ref 0.2–1.2)
BILIRUB UR-MCNC: NEGATIVE — SIGNIFICANT CHANGE UP
BLD GP AB SCN SERPL QL: NEGATIVE — SIGNIFICANT CHANGE UP
BUN SERPL-MCNC: 22 MG/DL — SIGNIFICANT CHANGE UP (ref 7–23)
CALCIUM SERPL-MCNC: 9.8 MG/DL — SIGNIFICANT CHANGE UP (ref 8.4–10.5)
CHLORIDE SERPL-SCNC: 105 MMOL/L — SIGNIFICANT CHANGE UP (ref 96–108)
CO2 SERPL-SCNC: 21 MMOL/L — LOW (ref 22–31)
COLOR SPEC: YELLOW — SIGNIFICANT CHANGE UP
CREAT SERPL-MCNC: 0.77 MG/DL — SIGNIFICANT CHANGE UP (ref 0.5–1.3)
DIFF PNL FLD: NEGATIVE — SIGNIFICANT CHANGE UP
EOSINOPHIL # BLD AUTO: 0 K/UL — SIGNIFICANT CHANGE UP (ref 0–0.5)
EOSINOPHIL NFR BLD AUTO: 0 % — SIGNIFICANT CHANGE UP (ref 0–6)
EPI CELLS # UR: 1 /HPF — SIGNIFICANT CHANGE UP
GLUCOSE SERPL-MCNC: 181 MG/DL — HIGH (ref 70–99)
GLUCOSE UR QL: NEGATIVE — SIGNIFICANT CHANGE UP
HCT VFR BLD CALC: 42.8 % — SIGNIFICANT CHANGE UP (ref 34.5–45)
HGB BLD-MCNC: 14.1 G/DL — SIGNIFICANT CHANGE UP (ref 11.5–15.5)
HYALINE CASTS # UR AUTO: 9 /LPF — HIGH (ref 0–2)
IMM GRANULOCYTES NFR BLD AUTO: 0.4 % — SIGNIFICANT CHANGE UP (ref 0–1.5)
INR BLD: 1.04 RATIO — SIGNIFICANT CHANGE UP (ref 0.88–1.16)
KETONES UR-MCNC: SIGNIFICANT CHANGE UP
LEUKOCYTE ESTERASE UR-ACNC: NEGATIVE — SIGNIFICANT CHANGE UP
LYMPHOCYTES # BLD AUTO: 0.56 K/UL — LOW (ref 1–3.3)
LYMPHOCYTES # BLD AUTO: 4.2 % — LOW (ref 13–44)
MAGNESIUM SERPL-MCNC: 2.1 MG/DL — SIGNIFICANT CHANGE UP (ref 1.6–2.6)
MCHC RBC-ENTMCNC: 29.1 PG — SIGNIFICANT CHANGE UP (ref 27–34)
MCHC RBC-ENTMCNC: 32.9 GM/DL — SIGNIFICANT CHANGE UP (ref 32–36)
MCV RBC AUTO: 88.4 FL — SIGNIFICANT CHANGE UP (ref 80–100)
MONOCYTES # BLD AUTO: 0.9 K/UL — SIGNIFICANT CHANGE UP (ref 0–0.9)
MONOCYTES NFR BLD AUTO: 6.7 % — SIGNIFICANT CHANGE UP (ref 2–14)
NEUTROPHILS # BLD AUTO: 11.82 K/UL — HIGH (ref 1.8–7.4)
NEUTROPHILS NFR BLD AUTO: 88.6 % — HIGH (ref 43–77)
NITRITE UR-MCNC: NEGATIVE — SIGNIFICANT CHANGE UP
NRBC # BLD: 0 /100 WBCS — SIGNIFICANT CHANGE UP (ref 0–0)
NT-PROBNP SERPL-SCNC: 320 PG/ML — HIGH (ref 0–300)
PH UR: 5.5 — SIGNIFICANT CHANGE UP (ref 5–8)
PLATELET # BLD AUTO: 250 K/UL — SIGNIFICANT CHANGE UP (ref 150–400)
POTASSIUM SERPL-MCNC: 4.3 MMOL/L — SIGNIFICANT CHANGE UP (ref 3.5–5.3)
POTASSIUM SERPL-SCNC: 4.3 MMOL/L — SIGNIFICANT CHANGE UP (ref 3.5–5.3)
PROT SERPL-MCNC: 8 G/DL — SIGNIFICANT CHANGE UP (ref 6–8.3)
PROT UR-MCNC: ABNORMAL
PROTHROM AB SERPL-ACNC: 12.5 SEC — SIGNIFICANT CHANGE UP (ref 10.6–13.6)
RBC # BLD: 4.84 M/UL — SIGNIFICANT CHANGE UP (ref 3.8–5.2)
RBC # FLD: 13.7 % — SIGNIFICANT CHANGE UP (ref 10.3–14.5)
RBC CASTS # UR COMP ASSIST: 2 /HPF — SIGNIFICANT CHANGE UP (ref 0–4)
RH IG SCN BLD-IMP: POSITIVE — SIGNIFICANT CHANGE UP
RH IG SCN BLD-IMP: POSITIVE — SIGNIFICANT CHANGE UP
SARS-COV-2 RNA SPEC QL NAA+PROBE: SIGNIFICANT CHANGE UP
SODIUM SERPL-SCNC: 143 MMOL/L — SIGNIFICANT CHANGE UP (ref 135–145)
SP GR SPEC: 1.03 — HIGH (ref 1.01–1.02)
TROPONIN T, HIGH SENSITIVITY RESULT: 14 NG/L — SIGNIFICANT CHANGE UP (ref 0–51)
UROBILINOGEN FLD QL: NEGATIVE — SIGNIFICANT CHANGE UP
WBC # BLD: 13.35 K/UL — HIGH (ref 3.8–10.5)
WBC # FLD AUTO: 13.35 K/UL — HIGH (ref 3.8–10.5)
WBC UR QL: 1 /HPF — SIGNIFICANT CHANGE UP (ref 0–5)

## 2021-01-09 PROCEDURE — 73552 X-RAY EXAM OF FEMUR 2/>: CPT | Mod: 26,LT

## 2021-01-09 PROCEDURE — 99285 EMERGENCY DEPT VISIT HI MDM: CPT | Mod: GC

## 2021-01-09 PROCEDURE — 72128 CT CHEST SPINE W/O DYE: CPT | Mod: 26

## 2021-01-09 PROCEDURE — 73060 X-RAY EXAM OF HUMERUS: CPT | Mod: 26,LT

## 2021-01-09 PROCEDURE — 93010 ELECTROCARDIOGRAM REPORT: CPT | Mod: GC

## 2021-01-09 PROCEDURE — 73502 X-RAY EXAM HIP UNI 2-3 VIEWS: CPT | Mod: 26,LT

## 2021-01-09 PROCEDURE — 73070 X-RAY EXAM OF ELBOW: CPT | Mod: 26,LT

## 2021-01-09 PROCEDURE — 71045 X-RAY EXAM CHEST 1 VIEW: CPT | Mod: 26

## 2021-01-09 PROCEDURE — 73030 X-RAY EXAM OF SHOULDER: CPT | Mod: 26,LT

## 2021-01-09 PROCEDURE — 70450 CT HEAD/BRAIN W/O DYE: CPT | Mod: 26

## 2021-01-09 PROCEDURE — 71250 CT THORAX DX C-: CPT | Mod: 26

## 2021-01-09 PROCEDURE — 72125 CT NECK SPINE W/O DYE: CPT | Mod: 26

## 2021-01-09 RX ORDER — OXYCODONE HYDROCHLORIDE 5 MG/1
10 TABLET ORAL EVERY 4 HOURS
Refills: 0 | Status: DISCONTINUED | OUTPATIENT
Start: 2021-01-09 | End: 2021-01-10

## 2021-01-09 RX ORDER — OXYCODONE HYDROCHLORIDE 5 MG/1
5 TABLET ORAL EVERY 4 HOURS
Refills: 0 | Status: DISCONTINUED | OUTPATIENT
Start: 2021-01-09 | End: 2021-01-10

## 2021-01-09 RX ORDER — MORPHINE SULFATE 50 MG/1
2 CAPSULE, EXTENDED RELEASE ORAL ONCE
Refills: 0 | Status: DISCONTINUED | OUTPATIENT
Start: 2021-01-09 | End: 2021-01-09

## 2021-01-09 RX ORDER — LEVOTHYROXINE SODIUM 125 MCG
75 TABLET ORAL DAILY
Refills: 0 | Status: DISCONTINUED | OUTPATIENT
Start: 2021-01-09 | End: 2021-01-10

## 2021-01-09 RX ORDER — LIDOCAINE 4 G/100G
1 CREAM TOPICAL ONCE
Refills: 0 | Status: COMPLETED | OUTPATIENT
Start: 2021-01-09 | End: 2021-01-09

## 2021-01-09 RX ORDER — ACETAMINOPHEN 500 MG
650 TABLET ORAL ONCE
Refills: 0 | Status: COMPLETED | OUTPATIENT
Start: 2021-01-09 | End: 2021-01-09

## 2021-01-09 RX ORDER — ACETAMINOPHEN 500 MG
975 TABLET ORAL ONCE
Refills: 0 | Status: COMPLETED | OUTPATIENT
Start: 2021-01-09 | End: 2021-01-09

## 2021-01-09 RX ORDER — HEPARIN SODIUM 5000 [USP'U]/ML
5000 INJECTION INTRAVENOUS; SUBCUTANEOUS ONCE
Refills: 0 | Status: COMPLETED | OUTPATIENT
Start: 2021-01-09 | End: 2021-01-09

## 2021-01-09 RX ORDER — SODIUM CHLORIDE 9 MG/ML
1000 INJECTION, SOLUTION INTRAVENOUS
Refills: 0 | Status: DISCONTINUED | OUTPATIENT
Start: 2021-01-09 | End: 2021-01-10

## 2021-01-09 RX ORDER — TRAMADOL HYDROCHLORIDE 50 MG/1
25 TABLET ORAL EVERY 6 HOURS
Refills: 0 | Status: DISCONTINUED | OUTPATIENT
Start: 2021-01-09 | End: 2021-01-10

## 2021-01-09 RX ADMIN — Medication 650 MILLIGRAM(S): at 20:00

## 2021-01-09 RX ADMIN — Medication 975 MILLIGRAM(S): at 12:18

## 2021-01-09 RX ADMIN — LIDOCAINE 1 PATCH: 4 CREAM TOPICAL at 21:20

## 2021-01-09 RX ADMIN — MORPHINE SULFATE 2 MILLIGRAM(S): 50 CAPSULE, EXTENDED RELEASE ORAL at 15:45

## 2021-01-09 RX ADMIN — LIDOCAINE 1 PATCH: 4 CREAM TOPICAL at 12:18

## 2021-01-09 RX ADMIN — HEPARIN SODIUM 5000 UNIT(S): 5000 INJECTION INTRAVENOUS; SUBCUTANEOUS at 23:02

## 2021-01-09 NOTE — ED PROVIDER NOTE - CONSTITUTIONAL, MLM
normal... elderly frail appearing female awake, alert, oriented to person, place, time/situation and in no apparent distress.

## 2021-01-09 NOTE — ED ADULT NURSE NOTE - NSFALLRSKPASTHIST_ED_ALL_ED
yes Colchicine Counseling:  Patient counseled regarding adverse effects including but not limited to stomach upset (nausea, vomiting, stomach pain, or diarrhea).  Patient instructed to limit alcohol consumption while taking this medication.  Colchicine may reduce blood counts especially with prolonged use.  The patient understands that monitoring of kidney function and blood counts may be required, especially at baseline. The patient verbalized understanding of the proper use and possible adverse effects of colchicine.  All of the patient's questions and concerns were addressed.

## 2021-01-09 NOTE — H&P ADULT - NSHPPHYSICALEXAM_GEN_ALL_CORE
Gen: NAD  Left Upper Extremity:  skin intact  + swelling over olecranon  +pain, reduced AROM/PROM elbow  Non ttp over wrist/shoulder  SILT C5-T1  +AIN/PIN/radial/ulnar/median/musc  +radial pulse  soft compartments    Secondary Survey: No TTP over RUE/RLE bony prominences, Mild TTP over L Anterior Tibia with some brusiing and swelling, otherwise no other TTP over LLE, SILT, palpable pulses, full/painless range of motion, compartments soft

## 2021-01-09 NOTE — ED PROVIDER NOTE - CARE PLAN
Principal Discharge DX:	Back contusion, left, initial encounter  Secondary Diagnosis:	Near syncope  Secondary Diagnosis:	Contusion of left shoulder, initial encounter

## 2021-01-09 NOTE — ED ADULT NURSE REASSESSMENT NOTE - NS ED NURSE REASSESS COMMENT FT1
Called CT about patient being unable to lay flat for CT. MD Bradford aware that patient is unable to lay flat for scan.

## 2021-01-09 NOTE — CONSULT NOTE ADULT - SUBJECTIVE AND OBJECTIVE BOX
86yo F Hx of hypothyroidism, arthritis, spondylitis, cervical radiculopathy presenting s/p presyncope. reports that she began to feel lightheaded when she fell to the ground from standing height landing on her left side. denies LOC, not on blood thinners. Always has back pain but states that it is mildly worse after falling. Having pain in her left arm/elbow and shoulder as well as the left leg. denies any cp, sob, abd pain, n/v. Patient found to have a left humerus fx. scheduled for an Open reduction and internal fixation of the left humerus. Patient seen now resting comfortably.        PAST MEDICAL & SURGICAL HISTORY:  Hypothyroidism, unspecified type    Former smoker, stopped smoking many years ago    Spondylitis    Hernia, hiatal    Stomach ulcer    Arthritis    Hypothyroid    B/L TM rupture    No significant past surgical history    Status post left knee replacement    History of bilateral hip replacements          MEDICATIONS  (STANDING):    MEDICATIONS  (PRN):    Social Hx:  Tobacco: Former smoker  ETOH: rarely  Drugs: Neg    Family Hx:  As per my conversation with the patient, non contributory      ROS  CONSTITUTIONAL: No weakness, fevers or chills  EYES/ENT: No visual changes;  No vertigo or throat pain [place]  NECK: No pain or stiffness  RESPIRATORY: No cough, wheezing, hemoptysis; No shortness of breath  CARDIOVASCULAR: No chest pain or palpitations  GASTROINTESTINAL: No abdominal or epigastric pain. No nausea, vomiting, or hematemesis; No diarrhea or constipation. No melena or hematochezia.  GENITOURINARY: No dysuria, frequency or hematuria  NEUROLOGICAL: No numbness or weakness  SKIN: No itching, burning, rashes, or lesions   MUSCULOSKELETAL:  Left arm pain    INTERVAL HPI/OVERNIGHT EVENTS:  T(C): 36.8 (21 @ 22:26), Max: 38.1 (21 @ 19:43)  HR: 92 (21 @ 22:26) (92 - 104)  BP: 104/64 (21 @ 22:26) (104/64 - 134/80)  RR: 20 (21 @ 22:26) (18 - 20)  SpO2: 97% (21 @ 22:26) (95% - 97%)  Wt(kg): --  I&O's Summary      PHYSICAL EXAM:  GENERAL: NAD, well-groomed, well-developed  HEAD:  Atraumatic, Normocephalic  EYES: EOMI, PERRLA, conjunctiva and sclera clear  ENMT: No tonsillar erythema, exudates, or enlargement; Moist mucous membranes, Good dentition, No lesions  NECK: Supple, No JVD, Normal thyroid  NERVOUS SYSTEM:  Alert & Oriented X3, Good concentration; Motor Strength 5/5 B/L upper and lower extremities; DTRs 2+ intact and symmetric  CHEST/LUNG: Clear to percussion bilaterally; No rales, rhonchi, wheezing, or rubs  HEART: Regular rate and rhythm; No murmurs, rubs, or gallops  ABDOMEN: Soft, Nontender, Nondistended; Bowel sounds present  EXTREMITIES:  2+ Peripheral Pulses, No clubbing, cyanosis, or edema  LYMPH: No lymphadenopathy noted  SKIN: No rashes or lesions        LABS:                        14.1   13.35 )-----------( 250      ( 2021 12:00 )             42.8         143  |  105  |  22  ----------------------------<  181<H>  4.3   |  21<L>  |  0.77    Ca    9.8      2021 12:00  Mg     2.1         TPro  8.0  /  Alb  4.0  /  TBili  1.1  /  DBili  x   /  AST  41<H>  /  ALT  15  /  AlkPhos  94      PT/INR - ( 2021 12:00 )   PT: 12.5 sec;   INR: 1.04 ratio         PTT - ( 2021 12:00 )  PTT:31.4 sec  Urinalysis Basic - ( 2021 20:00 )    Color: Yellow / Appearance: Clear / S.027 / pH: x  Gluc: x / Ketone: Trace  / Bili: Negative / Urobili: Negative   Blood: x / Protein: 30 mg/dL / Nitrite: Negative   Leuk Esterase: Negative / RBC: 2 /hpf / WBC 1 /HPF   Sq Epi: x / Non Sq Epi: 1 /hpf / Bacteria: 0.0      CAPILLARY BLOOD GLUCOSE            Urinalysis Basic - ( 2021 20:00 )    Color: Yellow / Appearance: Clear / S.027 / pH: x  Gluc: x / Ketone: Trace  / Bili: Negative / Urobili: Negative   Blood: x / Protein: 30 mg/dL / Nitrite: Negative   Leuk Esterase: Negative / RBC: 2 /hpf / WBC 1 /HPF   Sq Epi: x / Non Sq Epi: 1 /hpf / Bacteria: 0.0      EKG: NSR @ 98 BPM + artifact

## 2021-01-09 NOTE — H&P ADULT - NSICDXPASTSURGICALHX_GEN_ALL_CORE_FT
PAST SURGICAL HISTORY:  History of bilateral hip replacements     No significant past surgical history     Status post left knee replacement

## 2021-01-09 NOTE — ED PROVIDER NOTE - OBJECTIVE STATEMENT
84yo F Hx of hypothyroidism, arthritis, spondylitis, cervical radiculopathy presenting s/p presyncope. reports that she began to feel lightheaded when she fell to the ground from standing height landing on her left side. denies LOC, not on blood thinners. 86yo F Hx of hypothyroidism, arthritis, spondylitis, cervical radiculopathy presenting s/p presyncope. reports that she began to feel lightheaded when she fell to the ground from standing height landing on her left side. denies LOC, not on blood thinners. always has back pain but states that it is mildly worse after falling. having pain in her left arm/wlbow and shoulder as well as the left leg. denies any cp, sob, abd pain, n/v.

## 2021-01-09 NOTE — ED ADULT NURSE NOTE - OBJECTIVE STATEMENT
84 y/o female brought in by EMS s/p fall. A&Ox3. PMH arthritis, spondylitis, cervical radiculopathy. According to EMS patient fell at her apartment and could not get up, she called her niece who then called 911. EMS reports they assisted the patient off the floor and that she ambulated several steps with them. According to the patient she was feeling lightheaded "not dizzy", and she fell, landing on her left side. Patient Denies hitting her head, and LOC. Patient reports she has a "burst ear drum" and is extremely hard of hearing. Patient has bruising noted to the left shoulder, left arm, with swelling at the elbow, as well as the left hip. Patient reports left hip pain 7/10 as well as left elbow and generalized back pain. Patient able to move all extremities, with difficulty moving left lower extremity due to pain. Equal sensation b/l.  Patient denies chest pain, fever, chills, n/v/d, urinary symptoms, abdominal pain. EKG performed. Safety measures maintained. Bed in the lowest position. Call bell within reach.  MD at the bedside. No acute distress noted or further complaints at this time. 84 y/o female brought in by EMS s/p fall. A&Ox3. PMH arthritis, spondylitis, cervical radiculopathy. According to EMS patient fell at her apartment and could not get up, she called her niece who then called 911. EMS reports they assisted the patient off the floor and that she ambulated several steps with them. According to the patient she was feeling lightheaded "not dizzy", and she fell, landing on her left side. Patient Denies hitting her head, and LOC. Patient reports she has a "burst ear drum" and is extremely hard of hearing. patient has a resting tremor, reports she "does not normally have it." Patient has bruising noted to the left shoulder, left arm, with swelling at the elbow, as well as the left hip. Patient reports left hip pain 7/10 as well as left elbow and generalized back pain. Patient able to move all extremities, with difficulty moving left lower extremity due to pain. Equal sensation b/l.  Patient denies chest pain, fever, chills, n/v/d, urinary symptoms, abdominal pain. EKG performed. Safety measures maintained. Bed in the lowest position. Call bell within reach.  MD at the bedside. No acute distress noted or further complaints at this time.

## 2021-01-09 NOTE — ED PROVIDER NOTE - MUSCULOSKELETAL, MLM
Spine appears normal, with thoracolumbar tenderness more on the right. no pelvic instability, L sided hip and groin tenderness with bruising along the lateral thigh. LLE very slightly shorter than the left, unable to lift leg 2/2 to pain. good distal pulses and sensation intact. pain of the left forearm, and elbow with some swelling and bruising present. unable to fully extend the elbow 2/2 pain. tenderness of left proximal humreous and shoulder with bruising of the shoulder present, decreased ROM due to pain.

## 2021-01-09 NOTE — ED PROVIDER NOTE - ATTENDING CONTRIBUTION TO CARE
------------ATTENDING NOTE------------  pt brought to ED by EMS after found down by nisarah, pt describes feeling lightheaded and fell onto L side, was unable to get up, c/o constant moderate ache in mid back and L shoulder and L hip, HD stable, no open wounds, LUE and LLE nvi w/ bcr distally and soft compartments, clear chest w/o distress, nml cardiac exam, no chest pain/discomfort or sob/dyspnea or palpitations, no recent fevers, initial labs wnl apart from hyperglycemia, awaiting 2nd Trop and imaging and close reassessments -->  - Sree Andrade MD    ---------------------------------------------- ------------ATTENDING NOTE------------  pt brought to ED by EMS after found down by nisarah, pt describes feeling lightheaded and fell onto L side, was unable to get up, c/o constant moderate ache in mid back and L shoulder and L hip, HD stable, no open wounds, LUE and LLE nvi w/ bcr distally and soft compartments, clear chest w/o distress, nml cardiac exam, no chest pain/discomfort or sob/dyspnea or palpitations, no recent fevers, initial labs wnl apart from hyperglycemia, awaiting 2nd Trop and imaging and close reassessments --> ED Sign Out (1500) pending imaging, troponin trending, close reassessments for disposition.  - Sree Andrade MD    ----------------------------------------------

## 2021-01-09 NOTE — CONSULT NOTE ADULT - ASSESSMENT
84 yo woman presents with a left humerus fx scheduled for an Open reduction and internal fixation of the left elbow

## 2021-01-09 NOTE — H&P ADULT - NSICDXPASTMEDICALHX_GEN_ALL_CORE_FT
PAST MEDICAL HISTORY:  Arthritis     Former smoker, stopped smoking many years ago     Hernia, hiatal     Hypothyroid     Hypothyroidism, unspecified type     Spondylitis     Stomach ulcer

## 2021-01-09 NOTE — ED ADULT NURSE REASSESSMENT NOTE - NS ED NURSE REASSESS COMMENT FT1
Patient unable to lay flat for x-ray and CT. MD Bradford aware. Will reassess patient's pain and ability to go to scans.

## 2021-01-09 NOTE — ED ADULT NURSE REASSESSMENT NOTE - NS ED NURSE REASSESS COMMENT FT1
Report received from JANEEN Novak . Pt a & o x 4, able to follow commands. Breathing spontaneous & nonlabored. Abdomen soft & nondistended.  Hematoma noted to L buttocks, L elbow & L shoulder. Pt complaining of pain, MD Pineda made aware. Pt unable to provide urine specimen. Straight cath ordered by MD. Procedure explained to pt. Straight cath done with JANEEN Patino to ensure sterile technique. Pt tolerated procedure well, 320 cc of dark urine drained. IV site patent, no signs of phlebitis, flushing without difficulty. Pt cleaned, turned & repositioned for safety & comfort. Call bell within reach, bed in lowest position.

## 2021-01-09 NOTE — H&P ADULT - NSHPLABSRESULTS_GEN_ALL_CORE
LABS:                        14.1   13.35 )-----------( 250      ( 09 Jan 2021 12:00 )             42.8     09 Jan 2021 12:00    143    |  105    |  22     ----------------------------<  181    4.3     |  21     |  0.77     Ca    9.8        09 Jan 2021 12:00  Mg     2.1       09 Jan 2021 12:00    TPro  8.0    /  Alb  4.0    /  TBili  1.1    /  DBili  x      /  AST  41     /  ALT  15     /  AlkPhos  94     09 Jan 2021 12:00    PT/INR - ( 09 Jan 2021 12:00 )   PT: 12.5 sec;   INR: 1.04 ratio         PTT - ( 09 Jan 2021 12:00 )  PTT:31.4 sec      XR L Elbow: Olecranon displaced fracture      Procedure:     Splint was applied, well padded to the LUE, post xrays obtained with adequate placement of splint, Pt n/v intact post splinting.

## 2021-01-09 NOTE — H&P ADULT - HISTORY OF PRESENT ILLNESS
85yFemale presents to [facility] ED c/o severe Left Elbow pain s/p mechanical fall that ocurred 1-2 days ago. Patient denies head hit or LOC. Localizing pain to left elbow. Denies radiation of pain. Pt denies numbness, tingling or burning. R Hand Dominant. Patient denies any other injuries.

## 2021-01-09 NOTE — ED ADULT NURSE NOTE - NSIMPLEMENTINTERV_GEN_ALL_ED
Implemented All Fall with Harm Risk Interventions:  Carlos to call system. Call bell, personal items and telephone within reach. Instruct patient to call for assistance. Room bathroom lighting operational. Non-slip footwear when patient is off stretcher. Physically safe environment: no spills, clutter or unnecessary equipment. Stretcher in lowest position, wheels locked, appropriate side rails in place. Provide visual cue, wrist band, yellow gown, etc. Monitor gait and stability. Monitor for mental status changes and reorient to person, place, and time. Review medications for side effects contributing to fall risk. Reinforce activity limits and safety measures with patient and family. Provide visual clues: red socks.

## 2021-01-09 NOTE — H&P ADULT - ASSESSMENT
A/P: 88y F s/p MF w/ displaced L olecranon fracture      case discussed with Dr. Johnston  Pt with displaced olecranon fracture, risk and benefits to surgery discussed in detail, pt would like to proceed with surgery at this time  OR Tomorrow with L Olecranon Fx ORIF with Dr. Johnston  admti to ortho  fu Med clearance  NPO after midnight  IV fluids while npo   no chemical DVT ppx after mdinight  NWJYOTSNA DOZIERE in splint and Sling  attending aware and agrees with above

## 2021-01-10 LAB
ANION GAP SERPL CALC-SCNC: 11 MMOL/L — SIGNIFICANT CHANGE UP (ref 5–17)
APTT BLD: 32.2 SEC — SIGNIFICANT CHANGE UP (ref 27.5–35.5)
BUN SERPL-MCNC: 26 MG/DL — HIGH (ref 7–23)
CALCIUM SERPL-MCNC: 8.8 MG/DL — SIGNIFICANT CHANGE UP (ref 8.4–10.5)
CHLORIDE SERPL-SCNC: 104 MMOL/L — SIGNIFICANT CHANGE UP (ref 96–108)
CO2 SERPL-SCNC: 23 MMOL/L — SIGNIFICANT CHANGE UP (ref 22–31)
CREAT SERPL-MCNC: 0.79 MG/DL — SIGNIFICANT CHANGE UP (ref 0.5–1.3)
GLUCOSE SERPL-MCNC: 131 MG/DL — HIGH (ref 70–99)
HCT VFR BLD CALC: 38.7 % — SIGNIFICANT CHANGE UP (ref 34.5–45)
HGB BLD-MCNC: 12.5 G/DL — SIGNIFICANT CHANGE UP (ref 11.5–15.5)
INR BLD: 1.11 RATIO — SIGNIFICANT CHANGE UP (ref 0.88–1.16)
MCHC RBC-ENTMCNC: 29.1 PG — SIGNIFICANT CHANGE UP (ref 27–34)
MCHC RBC-ENTMCNC: 32.3 GM/DL — SIGNIFICANT CHANGE UP (ref 32–36)
MCV RBC AUTO: 90.2 FL — SIGNIFICANT CHANGE UP (ref 80–100)
NRBC # BLD: 0 /100 WBCS — SIGNIFICANT CHANGE UP (ref 0–0)
PLATELET # BLD AUTO: 246 K/UL — SIGNIFICANT CHANGE UP (ref 150–400)
POTASSIUM SERPL-MCNC: 3.8 MMOL/L — SIGNIFICANT CHANGE UP (ref 3.5–5.3)
POTASSIUM SERPL-SCNC: 3.8 MMOL/L — SIGNIFICANT CHANGE UP (ref 3.5–5.3)
PROTHROM AB SERPL-ACNC: 13.2 SEC — SIGNIFICANT CHANGE UP (ref 10.6–13.6)
RBC # BLD: 4.29 M/UL — SIGNIFICANT CHANGE UP (ref 3.8–5.2)
RBC # FLD: 14 % — SIGNIFICANT CHANGE UP (ref 10.3–14.5)
SARS-COV-2 IGG SERPL QL IA: NEGATIVE — SIGNIFICANT CHANGE UP
SARS-COV-2 IGM SERPL IA-ACNC: <0.1 INDEX — SIGNIFICANT CHANGE UP
SODIUM SERPL-SCNC: 138 MMOL/L — SIGNIFICANT CHANGE UP (ref 135–145)
WBC # BLD: 12.38 K/UL — HIGH (ref 3.8–10.5)
WBC # FLD AUTO: 12.38 K/UL — HIGH (ref 3.8–10.5)

## 2021-01-10 PROCEDURE — 24685 OPTX ULNAR FX PROX END W/FIX: CPT | Mod: LT

## 2021-01-10 PROCEDURE — 93010 ELECTROCARDIOGRAM REPORT: CPT

## 2021-01-10 RX ORDER — POLYETHYLENE GLYCOL 3350 17 G/17G
17 POWDER, FOR SOLUTION ORAL DAILY
Refills: 0 | Status: DISCONTINUED | OUTPATIENT
Start: 2021-01-10 | End: 2021-01-12

## 2021-01-10 RX ORDER — OXYCODONE HYDROCHLORIDE 5 MG/1
2.5 TABLET ORAL EVERY 4 HOURS
Refills: 0 | Status: DISCONTINUED | OUTPATIENT
Start: 2021-01-10 | End: 2021-01-11

## 2021-01-10 RX ORDER — SODIUM CHLORIDE 9 MG/ML
1000 INJECTION INTRAMUSCULAR; INTRAVENOUS; SUBCUTANEOUS
Refills: 0 | Status: DISCONTINUED | OUTPATIENT
Start: 2021-01-10 | End: 2021-01-11

## 2021-01-10 RX ORDER — HYDROMORPHONE HYDROCHLORIDE 2 MG/ML
0.25 INJECTION INTRAMUSCULAR; INTRAVENOUS; SUBCUTANEOUS
Refills: 0 | Status: DISCONTINUED | OUTPATIENT
Start: 2021-01-10 | End: 2021-01-10

## 2021-01-10 RX ORDER — METOCLOPRAMIDE HCL 10 MG
10 TABLET ORAL ONCE
Refills: 0 | Status: COMPLETED | OUTPATIENT
Start: 2021-01-10 | End: 2021-01-10

## 2021-01-10 RX ORDER — ONDANSETRON 8 MG/1
4 TABLET, FILM COATED ORAL EVERY 6 HOURS
Refills: 0 | Status: DISCONTINUED | OUTPATIENT
Start: 2021-01-10 | End: 2021-01-12

## 2021-01-10 RX ORDER — CEFAZOLIN SODIUM 1 G
2000 VIAL (EA) INJECTION EVERY 8 HOURS
Refills: 0 | Status: COMPLETED | OUTPATIENT
Start: 2021-01-10 | End: 2021-01-11

## 2021-01-10 RX ORDER — SENNA PLUS 8.6 MG/1
2 TABLET ORAL AT BEDTIME
Refills: 0 | Status: DISCONTINUED | OUTPATIENT
Start: 2021-01-10 | End: 2021-01-12

## 2021-01-10 RX ORDER — ONDANSETRON 8 MG/1
4 TABLET, FILM COATED ORAL ONCE
Refills: 0 | Status: COMPLETED | OUTPATIENT
Start: 2021-01-10 | End: 2021-01-10

## 2021-01-10 RX ORDER — MAGNESIUM HYDROXIDE 400 MG/1
30 TABLET, CHEWABLE ORAL DAILY
Refills: 0 | Status: DISCONTINUED | OUTPATIENT
Start: 2021-01-10 | End: 2021-01-12

## 2021-01-10 RX ORDER — LANOLIN ALCOHOL/MO/W.PET/CERES
3 CREAM (GRAM) TOPICAL AT BEDTIME
Refills: 0 | Status: DISCONTINUED | OUTPATIENT
Start: 2021-01-10 | End: 2021-01-12

## 2021-01-10 RX ORDER — ACETAMINOPHEN 500 MG
975 TABLET ORAL EVERY 8 HOURS
Refills: 0 | Status: DISCONTINUED | OUTPATIENT
Start: 2021-01-10 | End: 2021-01-12

## 2021-01-10 RX ORDER — HYDROMORPHONE HYDROCHLORIDE 2 MG/ML
0.5 INJECTION INTRAMUSCULAR; INTRAVENOUS; SUBCUTANEOUS
Refills: 0 | Status: DISCONTINUED | OUTPATIENT
Start: 2021-01-10 | End: 2021-01-10

## 2021-01-10 RX ORDER — ASPIRIN/CALCIUM CARB/MAGNESIUM 324 MG
81 TABLET ORAL DAILY
Refills: 0 | Status: DISCONTINUED | OUTPATIENT
Start: 2021-01-10 | End: 2021-01-12

## 2021-01-10 RX ORDER — OXYCODONE HYDROCHLORIDE 5 MG/1
5 TABLET ORAL EVERY 4 HOURS
Refills: 0 | Status: DISCONTINUED | OUTPATIENT
Start: 2021-01-10 | End: 2021-01-11

## 2021-01-10 RX ORDER — LEVOTHYROXINE SODIUM 125 MCG
75 TABLET ORAL DAILY
Refills: 0 | Status: DISCONTINUED | OUTPATIENT
Start: 2021-01-10 | End: 2021-01-12

## 2021-01-10 RX ORDER — KETOROLAC TROMETHAMINE 30 MG/ML
15 SYRINGE (ML) INJECTION EVERY 6 HOURS
Refills: 0 | Status: DISCONTINUED | OUTPATIENT
Start: 2021-01-10 | End: 2021-01-11

## 2021-01-10 RX ADMIN — OXYCODONE HYDROCHLORIDE 5 MILLIGRAM(S): 5 TABLET ORAL at 00:18

## 2021-01-10 RX ADMIN — SODIUM CHLORIDE 75 MILLILITER(S): 9 INJECTION, SOLUTION INTRAVENOUS at 00:06

## 2021-01-10 RX ADMIN — SODIUM CHLORIDE 125 MILLILITER(S): 9 INJECTION INTRAMUSCULAR; INTRAVENOUS; SUBCUTANEOUS at 13:52

## 2021-01-10 RX ADMIN — LIDOCAINE 1 PATCH: 4 CREAM TOPICAL at 00:03

## 2021-01-10 RX ADMIN — Medication 100 MILLIGRAM(S): at 17:27

## 2021-01-10 RX ADMIN — ONDANSETRON 4 MILLIGRAM(S): 8 TABLET, FILM COATED ORAL at 13:14

## 2021-01-10 RX ADMIN — Medication 975 MILLIGRAM(S): at 23:58

## 2021-01-10 RX ADMIN — Medication 15 MILLIGRAM(S): at 17:27

## 2021-01-10 RX ADMIN — SENNA PLUS 2 TABLET(S): 8.6 TABLET ORAL at 20:47

## 2021-01-10 RX ADMIN — Medication 10 MILLIGRAM(S): at 14:39

## 2021-01-10 RX ADMIN — Medication 75 MICROGRAM(S): at 04:48

## 2021-01-10 RX ADMIN — Medication 15 MILLIGRAM(S): at 23:58

## 2021-01-10 NOTE — ED ADULT NURSE REASSESSMENT NOTE - NS ED NURSE REASSESS COMMENT FT1
Floor RN Mindy on [address] called and updated that new IV was placed, maintenance fluids are infusing, Lidocaine patch was removed & that patient was given pain medication.

## 2021-01-10 NOTE — BRIEF OPERATIVE NOTE - NSICDXBRIEFPROCEDURE_GEN_ALL_CORE_FT
PROCEDURES:  Open reduction and internal fixation of fracture of left olecranon 10-Andry-2021 12:36:27  Rachael Centeno

## 2021-01-10 NOTE — BRIEF OPERATIVE NOTE - NSICDXBRIEFPREOP_GEN_ALL_CORE_FT
PRE-OP DIAGNOSIS:  Fracture of olecranon process, left, closed 10-Andry-2021 12:37:08  Rachael Centeno

## 2021-01-10 NOTE — PHYSICAL THERAPY INITIAL EVALUATION ADULT - IMPAIRED TRANSFERS: SIT/STAND, REHAB EVAL
pt with c/o of L thigh pain 7/10 in sitting, standing and ambulation (pain not getting worse at anytime, xray (-) for femur/pelvis fx)/impaired balance/pain/decreased ROM/decreased strength

## 2021-01-10 NOTE — PHYSICAL THERAPY INITIAL EVALUATION ADULT - ACTIVE RANGE OF MOTION EXAMINATION, REHAB EVAL
L UE - not assessed 2/2 to jamie fx and NWB - L UE in sling/Right UE Active ROM was WNL (within normal limits)/bilateral lower extremity Active ROM was WNL (within normal limits)/deficits as listed below

## 2021-01-10 NOTE — PHYSICAL THERAPY INITIAL EVALUATION ADULT - IMPAIRMENTS CONTRIBUTING TO GAIT DEVIATIONS, PT EVAL
pt with c/o of L thigh pain 7/10 in sitting, standing and ambulation (pain not getting worse at anytime, xray (-) for femur/pelvis fx)/impaired balance/pain/decreased strength

## 2021-01-10 NOTE — PHYSICAL THERAPY INITIAL EVALUATION ADULT - PERTINENT HX OF CURRENT PROBLEM, REHAB EVAL
84 y/o F presents to [facility] ED c/o severe Left Elbow pain s/p mechanical fall that ocurred 1-2 days ago was found to have displaced L olecranon fx is now s/p ORIF, [facility]. CTH, CT-CS, CT-TS w/ no acute fx. X-ray L elbow w/ Acute displaced fracture of the left olecranon process. 86 y/o F presents to [facility] ED c/o severe Left Elbow pain s/p mechanical fall that occurred 1-2 days ago was found to have displaced L olecranon fx is now s/p ORIF, [employer]. CTH, CT-CS, CT-TS w/ no acute fx. X-ray L elbow w/ Acute displaced fracture of the left olecranon process.

## 2021-01-10 NOTE — PHYSICAL THERAPY INITIAL EVALUATION ADULT - FOLLOWS COMMANDS/ANSWERS QUESTIONS, REHAB EVAL
Pt very hard of hearing without hearing aids in hospital., requiring extra time for instructions./100% of the time

## 2021-01-10 NOTE — CHART NOTE - NSCHARTNOTEFT_GEN_A_CORE
Resting without complaints.  No Chest Pain, SOB, N/V.    T(C): 36.5 (01-10-21 @ 14:30), Max: 38.1 (01-09-21 @ 19:43)  HR: 64 (01-10-21 @ 14:30) (62 - 104)  BP: 114/60 (01-10-21 @ 14:30) (104/64 - 140/63)  RR: 18 (01-10-21 @ 14:30) (15 - 20)  SpO2: 98% (01-10-21 @ 14:30) (92% - 100%)  Wt(kg): --    Exam:  Alert and Foley, No Acute Distress  Card: +S1/S2, RRR  Pulm: CTAB  Laterality: L Elbow  Dressing: Sling and Posterior Slab c/d/i  Forearm soft, proximal deltoid region soft.  No signs of compartment syndrome.  Wiggles fingers  SILT  + DP pulse    Xray: Intra-Op Fluoroscopy: S/P L Olecranon Fx ORIF, hardware in place and intact with no signs of josi hardware fx.                          12.5   12.38 )-----------( 246      ( 10 Andry 2021 04:40 )             38.7    01-10    138  |  104  |  26<H>  ----------------------------<  131<H>  3.8   |  23  |  0.79    Ca    8.8      10 Andry 2021 04:40  Mg     2.1     01-09    TPro  8.0  /  Alb  4.0  /  TBili  1.1  /  DBili  x   /  AST  41<H>  /  ALT  15  /  AlkPhos  94  01-09      A/P: 85y Female S/p L Olecranon Fx ORIF. VSS. NAD  -PT/OT-NWB LUE in sling  -F/U AM labs tomorrow   -IS  -DVT PPx: ASA 81mg Daily  -Pain Control  -Continue Current Tx  -Dispo planning pending PT and OT recommendations.    Toribio Denise PA-C  Orthopedic Surgery Team  Team Pager #[phone]

## 2021-01-10 NOTE — PHYSICAL THERAPY INITIAL EVALUATION ADULT - DIAGNOSIS, PT EVAL
s/p fall w/L olecranon fx (NWB) pt has decreased activity tolerance, decreased functional mobility capacity, decreased strength, impaired balance

## 2021-01-10 NOTE — PHYSICAL THERAPY INITIAL EVALUATION ADULT - GENERAL OBSERVATIONS, REHAB EVAL
Pt received seated in chair in NAD, +IV. Pt AOx3, pleasant and cooperative. Pt [place] (does not have hearing aids with her).

## 2021-01-10 NOTE — BRIEF OPERATIVE NOTE - NSICDXBRIEFPOSTOP_GEN_ALL_CORE_FT
POST-OP DIAGNOSIS:  Fracture of olecranon process, left, closed 10-Andry-2021 12:37:30  Rachael Centeno

## 2021-01-11 ENCOUNTER — TRANSCRIPTION ENCOUNTER (OUTPATIENT)
Age: 86
End: 2021-01-11

## 2021-01-11 LAB
ANION GAP SERPL CALC-SCNC: 11 MMOL/L — SIGNIFICANT CHANGE UP (ref 5–17)
BUN SERPL-MCNC: 25 MG/DL — HIGH (ref 7–23)
CALCIUM SERPL-MCNC: 8 MG/DL — LOW (ref 8.4–10.5)
CHLORIDE SERPL-SCNC: 107 MMOL/L — SIGNIFICANT CHANGE UP (ref 96–108)
CO2 SERPL-SCNC: 22 MMOL/L — SIGNIFICANT CHANGE UP (ref 22–31)
CREAT SERPL-MCNC: 0.79 MG/DL — SIGNIFICANT CHANGE UP (ref 0.5–1.3)
GLUCOSE SERPL-MCNC: 105 MG/DL — HIGH (ref 70–99)
HCT VFR BLD CALC: 33.9 % — LOW (ref 34.5–45)
HGB BLD-MCNC: 10.6 G/DL — LOW (ref 11.5–15.5)
MCHC RBC-ENTMCNC: 28.7 PG — SIGNIFICANT CHANGE UP (ref 27–34)
MCHC RBC-ENTMCNC: 31.3 GM/DL — LOW (ref 32–36)
MCV RBC AUTO: 91.9 FL — SIGNIFICANT CHANGE UP (ref 80–100)
NRBC # BLD: 0 /100 WBCS — SIGNIFICANT CHANGE UP (ref 0–0)
PLATELET # BLD AUTO: 189 K/UL — SIGNIFICANT CHANGE UP (ref 150–400)
POTASSIUM SERPL-MCNC: 3.9 MMOL/L — SIGNIFICANT CHANGE UP (ref 3.5–5.3)
POTASSIUM SERPL-SCNC: 3.9 MMOL/L — SIGNIFICANT CHANGE UP (ref 3.5–5.3)
RBC # BLD: 3.69 M/UL — LOW (ref 3.8–5.2)
RBC # FLD: 13.7 % — SIGNIFICANT CHANGE UP (ref 10.3–14.5)
SODIUM SERPL-SCNC: 140 MMOL/L — SIGNIFICANT CHANGE UP (ref 135–145)
WBC # BLD: 8.95 K/UL — SIGNIFICANT CHANGE UP (ref 3.8–10.5)
WBC # FLD AUTO: 8.95 K/UL — SIGNIFICANT CHANGE UP (ref 3.8–10.5)

## 2021-01-11 RX ORDER — ASPIRIN/CALCIUM CARB/MAGNESIUM 324 MG
1 TABLET ORAL
Qty: 0 | Refills: 0 | DISCHARGE
Start: 2021-01-11

## 2021-01-11 RX ORDER — OXYCODONE HYDROCHLORIDE 5 MG/1
1 TABLET ORAL
Qty: 0 | Refills: 0 | DISCHARGE
Start: 2021-01-11

## 2021-01-11 RX ORDER — SODIUM CHLORIDE 9 MG/ML
500 INJECTION INTRAMUSCULAR; INTRAVENOUS; SUBCUTANEOUS ONCE
Refills: 0 | Status: COMPLETED | OUTPATIENT
Start: 2021-01-11 | End: 2021-01-11

## 2021-01-11 RX ORDER — TRAMADOL HYDROCHLORIDE 50 MG/1
50 TABLET ORAL EVERY 12 HOURS
Refills: 0 | Status: DISCONTINUED | OUTPATIENT
Start: 2021-01-11 | End: 2021-01-12

## 2021-01-11 RX ORDER — ACETAMINOPHEN 500 MG
3 TABLET ORAL
Qty: 0 | Refills: 0 | DISCHARGE
Start: 2021-01-11

## 2021-01-11 RX ORDER — SENNA PLUS 8.6 MG/1
2 TABLET ORAL
Qty: 0 | Refills: 0 | DISCHARGE
Start: 2021-01-11

## 2021-01-11 RX ORDER — ASPIRIN/CALCIUM CARB/MAGNESIUM 324 MG
1 TABLET ORAL
Qty: 42 | Refills: 0
Start: 2021-01-11 | End: 2021-02-21

## 2021-01-11 RX ORDER — QUETIAPINE FUMARATE 200 MG/1
12.5 TABLET, FILM COATED ORAL AT BEDTIME
Refills: 0 | Status: DISCONTINUED | OUTPATIENT
Start: 2021-01-11 | End: 2021-01-12

## 2021-01-11 RX ORDER — TRAMADOL HYDROCHLORIDE 50 MG/1
25 TABLET ORAL EVERY 4 HOURS
Refills: 0 | Status: DISCONTINUED | OUTPATIENT
Start: 2021-01-11 | End: 2021-01-12

## 2021-01-11 RX ORDER — CELECOXIB 200 MG/1
100 CAPSULE ORAL DAILY
Refills: 0 | Status: DISCONTINUED | OUTPATIENT
Start: 2021-01-12 | End: 2021-01-12

## 2021-01-11 RX ORDER — OXYCODONE HYDROCHLORIDE 5 MG/1
1 TABLET ORAL
Qty: 20 | Refills: 0
Start: 2021-01-11

## 2021-01-11 RX ADMIN — Medication 975 MILLIGRAM(S): at 21:08

## 2021-01-11 RX ADMIN — Medication 975 MILLIGRAM(S): at 11:58

## 2021-01-11 RX ADMIN — Medication 75 MICROGRAM(S): at 05:47

## 2021-01-11 RX ADMIN — SODIUM CHLORIDE 125 MILLILITER(S): 9 INJECTION INTRAMUSCULAR; INTRAVENOUS; SUBCUTANEOUS at 05:49

## 2021-01-11 RX ADMIN — Medication 100 MILLIGRAM(S): at 01:17

## 2021-01-11 RX ADMIN — Medication 81 MILLIGRAM(S): at 11:58

## 2021-01-11 RX ADMIN — SODIUM CHLORIDE 750 MILLILITER(S): 9 INJECTION INTRAMUSCULAR; INTRAVENOUS; SUBCUTANEOUS at 10:00

## 2021-01-11 RX ADMIN — Medication 15 MILLIGRAM(S): at 05:47

## 2021-01-11 RX ADMIN — Medication 15 MILLIGRAM(S): at 11:58

## 2021-01-11 RX ADMIN — POLYETHYLENE GLYCOL 3350 17 GRAM(S): 17 POWDER, FOR SOLUTION ORAL at 11:58

## 2021-01-11 NOTE — OCCUPATIONAL THERAPY INITIAL EVALUATION ADULT - ADL RETRAINING, OT EVAL
GOAL: Patient will be independent with UB dressing within 4 weeks GOAL: Pt will be independent with toileting in 4 weeks

## 2021-01-11 NOTE — DISCHARGE NOTE PROVIDER - CARE PROVIDERS DIRECT ADDRESSES
[email or web address]

## 2021-01-11 NOTE — DISCHARGE NOTE PROVIDER - HOSPITAL COURSE
History of Present Illness:   85yFemale presents to [facility] ED c/o severe Left Elbow pain s/p mechanical fall that occurred 1-2 days ago. Patient denies head hit or LOC. Localizing pain to left elbow. Denies radiation of pain. Pt denies numbness, tingling or burning. R Hand Dominant. Patient denies any other injuries.     Review of Systems:  Other Review of Systems: All other review of systems negative, except as noted in HPI    Allergies and Intolerances:        Allergies:  	vancomycin: Drug, Blisters    Patient History:    Past Medical, Past Surgical, and Family History:  PAST MEDICAL HISTORY:  Arthritis   Former smoker, stopped smoking many years ago   Hernia, hiatal   Hypothyroid   Hypothyroidism, unspecified type   Spondylitis   Stomach ulcer.     PAST SURGICAL HISTORY:  History of bilateral hip replacements   No significant past surgical history   Status post left knee replacement.     This is a 85 year old Female admitted to [facility] on 1/9/21 after a mechanical fall.  Found to have L Olecranon fracture.  Patient evaluated and cleared by Medicine for operative procedure.  On 1/10, patient underwent an uncomplicated ORIF.  Evaluated and treated by PT, recommended for home.  Remain of hospital stay unremarkable, and patient discharged home when PT cleared. History of Present Illness:   85yFemale presents to [facility] ED c/o severe Left Elbow pain s/p mechanical fall that occurred 1-2 days ago. Patient denies head hit or LOC. Localizing pain to left elbow. Denies radiation of pain. Pt denies numbness, tingling or burning. R Hand Dominant. Patient denies any other injuries.     Review of Systems:  Other Review of Systems: All other review of systems negative, except as noted in HPI    Allergies and Intolerances:        Allergies:  	vancomycin: Drug, Blisters    Patient History:    Past Medical, Past Surgical, and Family History:  PAST MEDICAL HISTORY:  Arthritis   Former smoker, stopped smoking many years ago   Hernia, hiatal   Hypothyroid   Hypothyroidism, unspecified type   Spondylitis   Stomach ulcer.     PAST SURGICAL HISTORY:  History of bilateral hip replacements   No significant past surgical history   Status post left knee replacement.     This is a 85 year old Female admitted to [facility] on 1/9/21 after a mechanical fall.  Found to have L Olecranon fracture.  Patient evaluated and cleared by Medicine for operative procedure.  On 1/10, patient underwent an uncomplicated ORIF.  Evaluated and treated by PT, recommended for Rehab.  Remain of hospital stay unremarkable, and patient discharged rehab when bed available. History of Present Illness:   85yFemale presents to [facility] ED c/o severe Left Elbow pain s/p mechanical fall that occurred 1-2 days ago. Patient denies head hit or LOC. Localizing pain to left elbow. Denies radiation of pain. Pt denies numbness, tingling or burning. R Hand Dominant. Patient denies any other injuries.     Review of Systems:  Other Review of Systems: All other review of systems negative, except as noted in HPI    Allergies and Intolerances:        Allergies:  	vancomycin: Drug, Blisters    Patient History:    Past Medical, Past Surgical, and Family History:  PAST MEDICAL HISTORY:  Arthritis   Former smoker, stopped smoking many years ago   Hernia, hiatal   Hypothyroid   Hypothyroidism, unspecified type   Spondylitis   Stomach ulcer.     PAST SURGICAL HISTORY:  History of bilateral hip replacements   No significant past surgical history   Status post left knee replacement.     This is a 85 year old Female admitted to [facility] on 1/9/21 after a mechanical fall.  Found to have L Olecranon fracture.  Patient evaluated and cleared by Medicine for operative procedure.  On 1/10, patient underwent an uncomplicated ORIF.  Evaluated and treated by PT, recommended for Rehab.  Patient required insertion of indwelling castle catheter during hospital stay due to urinary retention. Remain of hospital stay unremarkable, and patient discharged rehab when bed available.

## 2021-01-11 NOTE — DISCHARGE NOTE PROVIDER - NSDCCPCAREPLAN_GEN_ALL_CORE_FT
PRINCIPAL DISCHARGE DIAGNOSIS  Diagnosis: Back contusion, left, initial encounter  Assessment and Plan of Treatment:       SECONDARY DISCHARGE DIAGNOSES  Diagnosis: Contusion of left shoulder, initial encounter  Assessment and Plan of Treatment:     Diagnosis: Near syncope  Assessment and Plan of Treatment:      PRINCIPAL DISCHARGE DIAGNOSIS  Diagnosis: Fracture of olecranon process, left, closed  Assessment and Plan of Treatment:       SECONDARY DISCHARGE DIAGNOSES  Diagnosis: Contusion of left shoulder, initial encounter  Assessment and Plan of Treatment:     Diagnosis: Near syncope  Assessment and Plan of Treatment:

## 2021-01-11 NOTE — OCCUPATIONAL THERAPY INITIAL EVALUATION ADULT - ADDITIONAL COMMENTS
CT Head/C/T spine (-)   XR L humerus 1/9 Acute displaced fracture of the left olecranon process.  XR L femur Bilateral total hip arthroplasties with mild cortical thickening at the left proximal femoral shaft laterally which is consistent with age-indeterminate stress reaction.  1/10 s/p ORIF left olecranon

## 2021-01-11 NOTE — DISCHARGE NOTE PROVIDER - CARE PROVIDER_API CALL
Liam Johnston)  Orthopaedic Surgery  [address]  [place] [address]  Phone: [phone]  Fax: [phone]  Follow Up Time:

## 2021-01-11 NOTE — DISCHARGE NOTE PROVIDER - NSDCACTIVITY_GEN_ALL_CORE
Do not drive or operate machinery/Do not make important decisions/Stairs allowed/Walking - Indoors allowed/Walking - Outdoors allowed

## 2021-01-11 NOTE — DISCHARGE NOTE PROVIDER - NSDCQMSTROKE_NEU_ALL_CORE
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 Denies having any more episodes of diarrhea, now reports slight cramping. Client took HS meds. Denies any depression. No

## 2021-01-11 NOTE — DISCHARGE NOTE PROVIDER - NSDCCPTREATMENT_GEN_ALL_CORE_FT
PRINCIPAL PROCEDURE  Procedure: Open reduction and internal fixation of fracture of left olecranon  Findings and Treatment:

## 2021-01-11 NOTE — OCCUPATIONAL THERAPY INITIAL EVALUATION ADULT - PERTINENT HX OF CURRENT PROBLEM, REHAB EVAL
85yFemale presents to [facility] ED c/o severe Left Elbow pain s/p mechanical fall that ocurred 1-2 days ago. Patient denies head hit or LOC. Localizing pain to left elbow. Denies radiation of pain. Pt denies numbness, tingling or burning. R Hand Dominant. Patient denies any other injuries.

## 2021-01-11 NOTE — OCCUPATIONAL THERAPY INITIAL EVALUATION ADULT - LIVES WITH, PROFILE
Lives in apartment alone, several steps to enter (unable to name how many), elevator to floor, +tub shower and grab bars/alone

## 2021-01-11 NOTE — DISCHARGE NOTE PROVIDER - NSDCFUADDINST_GEN_ALL_CORE_FT
Please follow up with your doctor 14 days after your discharge from the hospital (call for appointment).  PT-Non weight bearing LUE in splint/sling.  Aspirin 81 twice daily x 6 weeks total for dvt prevention.  Keep dressing clean and intact, have doctor remove staples/sutures post op day 14 (if applicable) and apply steristrips.  Please follow up with your PMD within 1 month for routine checkup.  Please follow up with your doctor 14 days after your discharge from rehab (2 weeks, call for appointment).  PT-Non weight bearing LUE in splint/sling.  Aspirin 81 twice daily x 6 weeks total for dvt prevention.  Keep dressing clean and intact, have doctor remove staples/sutures post op day 14 (if applicable) and apply steristrips.  Please follow up with your PMD within 1 month for routine checkup.  Please follow up with your doctor 14 days after your discharge from rehab (2 weeks, call for appointment).  PT-Non weight bearing LUE in splint/sling.  Aspirin 81 twice daily x 6 weeks total for dvt prevention.  Keep dressing clean and intact, have doctor remove staples/sutures post op day 14 (if applicable) and apply steristrips. Please have trial of void while at rehab. Please follow up with your PMD within 1 month for routine checkup.

## 2021-01-11 NOTE — DISCHARGE NOTE PROVIDER - NSDCMRMEDTOKEN_GEN_ALL_CORE_FT
cranberry oral capsule:  orally once a day  cyanocobalamin 1000 mcg oral tablet: 2 tab(s) orally once a day  levothyroxine 88 mcg (0.088 mg) oral tablet: 1 tab(s) orally once a day  meclizine 25 mg oral tablet: 1 tab(s) orally 3 times a day, As Needed -for dizziness   pantoprazole 40 mg oral delayed release tablet: 1 tab(s) orally once a day (before a meal)  Synthroid 75 mcg (0.075 mg) oral tablet: 1 tab(s) orally once a day   acetaminophen 325 mg oral tablet: 3 tab(s) orally every 8 hours  aspirin 81 mg oral delayed release tablet: 1 tab(s) orally once a day x 6 weeks for dvt prevention  cranberry oral capsule:  orally once a day  cyanocobalamin 1000 mcg oral tablet: 2 tab(s) orally once a day  meclizine 25 mg oral tablet: 1 tab(s) orally 3 times a day, As Needed -for dizziness   oxyCODONE 5 mg oral tablet: 0.5-1 tab(s) orally every 4-6 hours, As needed, Moderate to Severe Pain. MDD:5  pantoprazole 40 mg oral delayed release tablet: 1 tab(s) orally once a day (before a meal)  senna oral tablet: 2 tab(s) orally once a day (at bedtime)  Synthroid 75 mcg (0.075 mg) oral tablet: 1 tab(s) orally once a day   acetaminophen 325 mg oral tablet: 3 tab(s) orally every 8 hours  aspirin 81 mg oral delayed release tablet: 1 tab(s) orally once a day x 6 weeks total for dvt prevention  cranberry oral capsule:  orally once a day  cyanocobalamin 1000 mcg oral tablet: 2 tab(s) orally once a day  meclizine 25 mg oral tablet: 1 tab(s) orally 3 times a day, As Needed -for dizziness   oxyCODONE 5 mg oral tablet: 0.5-1 tab(s) orally every 4-6 hours, As needed, Moderate to Severe Pain  pantoprazole 40 mg oral delayed release tablet: 1 tab(s) orally once a day (before a meal)  senna oral tablet: 2 tab(s) orally once a day (at bedtime)  Synthroid 75 mcg (0.075 mg) oral tablet: 1 tab(s) orally once a day

## 2021-01-12 ENCOUNTER — TRANSCRIPTION ENCOUNTER (OUTPATIENT)
Age: 86
End: 2021-01-12

## 2021-01-12 VITALS
SYSTOLIC BLOOD PRESSURE: 149 MMHG | HEART RATE: 62 BPM | TEMPERATURE: 98 F | RESPIRATION RATE: 18 BRPM | OXYGEN SATURATION: 97 % | DIASTOLIC BLOOD PRESSURE: 67 MMHG

## 2021-01-12 LAB — SARS-COV-2 RNA SPEC QL NAA+PROBE: SIGNIFICANT CHANGE UP

## 2021-01-12 RX ADMIN — Medication 75 MICROGRAM(S): at 05:08

## 2021-01-12 RX ADMIN — Medication 81 MILLIGRAM(S): at 10:55

## 2021-01-12 RX ADMIN — TRAMADOL HYDROCHLORIDE 25 MILLIGRAM(S): 50 TABLET ORAL at 10:53

## 2021-01-12 RX ADMIN — CELECOXIB 100 MILLIGRAM(S): 200 CAPSULE ORAL at 10:55

## 2021-01-12 NOTE — PROGRESS NOTE ADULT - PROBLEM SELECTOR PLAN 3
continue current dose of synthroid  Patient seems euthyroid

## 2021-01-12 NOTE — PROGRESS NOTE ADULT - PROBLEM SELECTOR PROBLEM 1
Olecranon fracture, left, closed, initial encounter

## 2021-01-12 NOTE — PROGRESS NOTE ADULT - SUBJECTIVE AND OBJECTIVE BOX
Post op Day [1 ]    Patient resting without complaints.  No chest pain, SOB, N/V.    T(C): 36.7 (01-11-21 @ 04:41), Max: 36.7 (01-10-21 @ 07:30)  HR: 67 (01-11-21 @ 04:41) (62 - 88)  BP: 116/72 (01-11-21 @ 04:41) (91/50 - 140/63)  RR: 16 (01-11-21 @ 04:41) (15 - 18)  SpO2: 96% (01-11-21 @ 04:41) (92% - 100%)  Wt(kg): --    Exam:  Alert and Oriented, No Acute Distress  LUE in sling/splint, soft/compressible compartments   digits pink, warm, mobile, SILT, +Radial pulse  Calves Soft, Non-tender bilaterally                          12.5   12.38 )-----------( 246      ( 10 Andry 2021 04:40 )             38.7    01-10    138  |  104  |  26<H>  ----------------------------<  131<H>  3.8   |  23  |  0.79    Ca    8.8      10 Andry 2021 04:40  Mg     2.1     01-09    TPro  8.0  /  Alb  4.0  /  TBili  1.1  /  DBili  x   /  AST  41<H>  /  ALT  15  /  AlkPhos  94  01-09      
Pt S&E at bedside this am. Pain controlled. No acute events overnight. denies numbness/tingling     Vital Signs Last 24 Hrs  T(C): 36.7 (10 Andry 2021 04:15), Max: 38.1 (09 Jan 2021 19:43)  T(F): 98.1 (10 Andry 2021 04:15), Max: 100.6 (09 Jan 2021 19:43)  HR: 87 (10 Andry 2021 04:15) (87 - 104)  BP: 122/74 (10 Andry 2021 04:15) (104/64 - 134/80)  BP(mean): 71 (10 Andry 2021 00:08) (71 - 74)  RR: 18 (10 Andry 2021 04:15) (18 - 20)  SpO2: 95% (10 Andry 2021 04:15) (95% - 97%)          PE:       AVSS  Gen: NAD  Left Upper Extremity:  Splint intact and in place  moving all digits  no pain with passive stretch all digits  brisk cap refill  sensation intact to all digits   soft compartments, - calf ttp 
Post op Day [2]    Patient seen and examined this morning. No acute events overnight. Pain well controlled. Tolerating diet. Denies N/V/D/F/C.     Vital Signs Last 24 Hrs  T(C): 36.4 (12 Jan 2021 05:14), Max: 36.9 (11 Jan 2021 14:44)  T(F): 97.5 (12 Jan 2021 05:14), Max: 98.4 (11 Jan 2021 14:44)  HR: 76 (12 Jan 2021 05:14) (62 - 92)  BP: 117/59 (12 Jan 2021 05:14) (94/48 - 151/73)  BP(mean): --  RR: 18 (12 Jan 2021 05:14) (16 - 18)  SpO2: 93% (12 Jan 2021 05:14) (93% - 99%)    Exam:  Alert and Oriented, No Acute Distress  LUE in sling/splint, soft/compressible compartments   +AIN/PIN/U  digits pink, warm, mobile, SILT, +Radial pulse  Calves Soft, Non-tender bilaterally                                                10.6   8.95  )-----------( 189      ( 11 Jan 2021 06:38 )             33.9   
86yo F Hx of hypothyroidism, arthritis, spondylitis, cervical radiculopathy presenting s/p presyncope. reports that she began to feel lightheaded when she fell to the ground from standing height landing on her left side. denies LOC, not on blood thinners. Always has back pain but states that it is mildly worse after falling. Having pain in her left arm/elbow and shoulder as well as the left leg. denies any cp, sob, abd pain, n/v. Patient found to have a left humerus fx. scheduled for an Open reduction and internal fixation of the left humerus. Patient seen now resting comfortably. Patient tolerated the procedure well. Pain has been improved. Patient has been working with physical therapy     MEDICATIONS  (STANDING):  acetaminophen   Tablet .. 975 milliGRAM(s) Oral every 8 hours  aspirin enteric coated 81 milliGRAM(s) Oral daily  celecoxib 100 milliGRAM(s) Oral daily  levothyroxine 75 MICROGram(s) Oral daily  polyethylene glycol 3350 17 Gram(s) Oral daily  QUEtiapine 12.5 milliGRAM(s) Oral at bedtime  senna 2 Tablet(s) Oral at bedtime    MEDICATIONS  (PRN):  bisacodyl Suppository 10 milliGRAM(s) Rectal daily PRN If no bowel movement  magnesium hydroxide Suspension 30 milliLiter(s) Oral daily PRN Constipation  melatonin 3 milliGRAM(s) Oral at bedtime PRN Insomnia  ondansetron Injectable 4 milliGRAM(s) IV Push every 6 hours PRN Nausea and/or Vomiting  traMADol 25 milliGRAM(s) Oral every 4 hours PRN Moderate Pain (4 - 6)  traMADol 50 milliGRAM(s) Oral every 12 hours PRN Severe Pain (7 - 10)          VITALS:   T(C): 36.9 (01-12-21 @ 12:47), Max: 36.9 (01-11-21 @ 14:44)  HR: 77 (01-12-21 @ 12:47) (63 - 92)  BP: 112/58 (01-12-21 @ 12:47) (110/56 - 151/73)  RR: 18 (01-12-21 @ 12:47) (18 - 18)  SpO2: 95% (01-12-21 @ 12:47) (93% - 99%)  Wt(kg): --      PHYSICAL EXAM:  GENERAL: NAD, well-groomed, well-developed  HEAD:  Atraumatic, Normocephalic  EYES: EOMI, PERRLA, conjunctiva and sclera clear  ENMT: No tonsillar erythema, exudates, or enlargement; Moist mucous membranes, Good dentition, No lesions  NECK: Supple, No JVD, Normal thyroid  NERVOUS SYSTEM:  Alert & Oriented X3, Good concentration; Motor Strength 5/5 B/L upper and lower extremities; DTRs 2+ intact and symmetric  CHEST/LUNG: Clear to percussion bilaterally; No rales, rhonchi, wheezing, or rubs  HEART: Regular rate and rhythm; No murmurs, rubs, or gallops  ABDOMEN: Soft, Nontender, Nondistended; Bowel sounds present  EXTREMITIES: decreased ROM of Left UE  LYMPH: No lymphadenopathy noted  SKIN: No rashes or lesions  LABS:        CBC Full  -  ( 11 Jan 2021 06:38 )  WBC Count : 8.95 K/uL  RBC Count : 3.69 M/uL  Hemoglobin : 10.6 g/dL  Hematocrit : 33.9 %  Platelet Count - Automated : 189 K/uL  Mean Cell Volume : 91.9 fl  Mean Cell Hemoglobin : 28.7 pg  Mean Cell Hemoglobin Concentration : 31.3 gm/dL  Auto Neutrophil # : x  Auto Lymphocyte # : x  Auto Monocyte # : x  Auto Eosinophil # : x  Auto Basophil # : x  Auto Neutrophil % : x  Auto Lymphocyte % : x  Auto Monocyte % : x  Auto Eosinophil % : x  Auto Basophil % : x    01-11    140  |  107  |  25<H>  ----------------------------<  105<H>  3.9   |  22  |  0.79    Ca    8.0<L>      11 Jan 2021 06:38            CAPILLARY BLOOD GLUCOSE          RADIOLOGY & ADDITIONAL TESTS:      
84yo F Hx of hypothyroidism, arthritis, spondylitis, cervical radiculopathy presenting s/p presyncope. reports that she began to feel lightheaded when she fell to the ground from standing height landing on her left side. denies LOC, not on blood thinners. Always has back pain but states that it is mildly worse after falling. Having pain in her left arm/elbow and shoulder as well as the left leg. denies any cp, sob, abd pain, n/v. Patient found to have a left humerus fx. scheduled for an Open reduction and internal fixation of the left humerus. Patient seen now resting comfortably. Patient tolerated the procedure well.     MEDICATIONS  (STANDING):  acetaminophen   Tablet .. 975 milliGRAM(s) Oral every 8 hours  aspirin enteric coated 81 milliGRAM(s) Oral daily  levothyroxine 75 MICROGram(s) Oral daily  polyethylene glycol 3350 17 Gram(s) Oral daily  senna 2 Tablet(s) Oral at bedtime  sodium chloride 0.9%. 1000 milliLiter(s) (125 mL/Hr) IV Continuous <Continuous>    MEDICATIONS  (PRN):  magnesium hydroxide Suspension 30 milliLiter(s) Oral daily PRN Constipation  melatonin 3 milliGRAM(s) Oral at bedtime PRN Insomnia  ondansetron Injectable 4 milliGRAM(s) IV Push every 6 hours PRN Nausea and/or Vomiting  oxyCODONE    IR 2.5 milliGRAM(s) Oral every 4 hours PRN Moderate Pain (4 - 6)  oxyCODONE    IR 5 milliGRAM(s) Oral every 4 hours PRN Severe Pain (7 - 10)          VITALS:   T(C): 36.9 (21 @ 14:44), Max: 36.9 (21 @ 14:44)  HR: 64 (21 @ 14:44) (62 - 90)  BP: 110/56 (21 @ 14:44) (91/50 - 125/58)  RR: 18 (21 @ 14:44) (16 - 18)  SpO2: 95% (21 @ 14:44) (94% - 99%)  Wt(kg): --    PHYSICAL EXAM:  GENERAL: NAD, well-groomed, well-developed  HEAD:  Atraumatic, Normocephalic  EYES: EOMI, PERRLA, conjunctiva and sclera clear  ENMT: No tonsillar erythema, exudates, or enlargement; Moist mucous membranes, Good dentition, No lesions  NECK: Supple, No JVD, Normal thyroid  NERVOUS SYSTEM:  Alert & Oriented X3, Good concentration; Motor Strength 5/5 B/L upper and lower extremities; DTRs 2+ intact and symmetric  CHEST/LUNG: Clear to percussion bilaterally; No rales, rhonchi, wheezing, or rubs  HEART: Regular rate and rhythm; No murmurs, rubs, or gallops  ABDOMEN: Soft, Nontender, Nondistended; Bowel sounds present  EXTREMITIES:  2+ Peripheral Pulses, No clubbing, cyanosis, or edema  LYMPH: No lymphadenopathy noted  SKIN: No rashes or lesions    LABS:        CBC Full  -  ( 2021 06:38 )  WBC Count : 8.95 K/uL  RBC Count : 3.69 M/uL  Hemoglobin : 10.6 g/dL  Hematocrit : 33.9 %  Platelet Count - Automated : 189 K/uL  Mean Cell Volume : 91.9 fl  Mean Cell Hemoglobin : 28.7 pg  Mean Cell Hemoglobin Concentration : 31.3 gm/dL  Auto Neutrophil # : x  Auto Lymphocyte # : x  Auto Monocyte # : x  Auto Eosinophil # : x  Auto Basophil # : x  Auto Neutrophil % : x  Auto Lymphocyte % : x  Auto Monocyte % : x  Auto Eosinophil % : x  Auto Basophil % : x    -11    140  |  107  |  25<H>  ----------------------------<  105<H>  3.9   |  22  |  0.79    Ca    8.0<L>      2021 06:38        PT/INR - ( 10 Andry 2021 04:40 )   PT: 13.2 sec;   INR: 1.11 ratio         PTT - ( 10 Andry 2021 04:40 )  PTT:32.2 sec  Urinalysis Basic - ( 2021 20:00 )    Color: Yellow / Appearance: Clear / S.027 / pH: x  Gluc: x / Ketone: Trace  / Bili: Negative / Urobili: Negative   Blood: x / Protein: 30 mg/dL / Nitrite: Negative   Leuk Esterase: Negative / RBC: 2 /hpf / WBC 1 /HPF   Sq Epi: x / Non Sq Epi: 1 /hpf / Bacteria: 0.0      CAPILLARY BLOOD GLUCOSE          RADIOLOGY & ADDITIONAL TESTS:      
84yo F Hx of hypothyroidism, arthritis, spondylitis, cervical radiculopathy presenting s/p presyncope. reports that she began to feel lightheaded when she fell to the ground from standing height landing on her left side. denies LOC, not on blood thinners. Always has back pain but states that it is mildly worse after falling. Having pain in her left arm/elbow and shoulder as well as the left leg. denies any cp, sob, abd pain, n/v. Patient found to have a left humerus fx. scheduled for an Open reduction and internal fixation of the left humerus. Patient seen now resting comfortably. Patient tolerated the procedure well.     MEDICATIONS  (STANDING):  acetaminophen   Tablet .. 975 milliGRAM(s) Oral every 8 hours  aspirin enteric coated 81 milliGRAM(s) Oral daily  levothyroxine 75 MICROGram(s) Oral daily  polyethylene glycol 3350 17 Gram(s) Oral daily  senna 2 Tablet(s) Oral at bedtime  sodium chloride 0.9%. 1000 milliLiter(s) (125 mL/Hr) IV Continuous <Continuous>    MEDICATIONS  (PRN):  magnesium hydroxide Suspension 30 milliLiter(s) Oral daily PRN Constipation  melatonin 3 milliGRAM(s) Oral at bedtime PRN Insomnia  ondansetron Injectable 4 milliGRAM(s) IV Push every 6 hours PRN Nausea and/or Vomiting  oxyCODONE    IR 2.5 milliGRAM(s) Oral every 4 hours PRN Moderate Pain (4 - 6)  oxyCODONE    IR 5 milliGRAM(s) Oral every 4 hours PRN Severe Pain (7 - 10)    T(C): 36.7 (01-11-21 @ 04:41), Max: 36.7 (01-10-21 @ 07:30)  HR: 67 (01-11-21 @ 04:41) (62 - 88)  BP: 116/72 (01-11-21 @ 04:41) (91/50 - 140/63)  RR: 16 (01-11-21 @ 04:41) (15 - 18)  SpO2: 96% (01-11-21 @ 04:41) (92% - 100%)  Wt(kg): --        PHYSICAL EXAM:  GENERAL: NAD, well-groomed, well-developed  HEAD:  Atraumatic, Normocephalic  EYES: EOMI, PERRLA, conjunctiva and sclera clear  ENMT: No tonsillar erythema, exudates, or enlargement; Moist mucous membranes, Good dentition, No lesions  NECK: Supple, No JVD, Normal thyroid  NERVOUS SYSTEM:  Alert & Oriented X3, Good concentration; Motor Strength 5/5 B/L upper and lower extremities; DTRs 2+ intact and symmetric  CHEST/LUNG: Clear to percussion bilaterally; No rales, rhonchi, wheezing, or rubs  HEART: Regular rate and rhythm; No murmurs, rubs, or gallops  ABDOMEN: Soft, Nontender, Nondistended; Bowel sounds present  EXTREMITIES:  2+ Peripheral Pulses, No clubbing, cyanosis, or edema  LYMPH: No lymphadenopathy noted  SKIN: No rashes or lesions    LABs              12.5   12.38 )-----------( 246      ( 10 Andry 2021 04:40 )             38.7    01-10    138  |  104  |  26<H>  ----------------------------<  131<H>  3.8   |  23  |  0.79    Ca    8.8      10 Andry 2021 04:40  Mg     2.1     01-09    TPro  8.0  /  Alb  4.0  /  TBili  1.1  /  DBili  x   /  AST  41<H>  /  ALT  15  /  AlkPhos  94  01-09            CAPILLARY BLOOD GLUCOSE          RADIOLOGY & ADDITIONAL TESTS:

## 2021-01-12 NOTE — PROGRESS NOTE ADULT - REASON FOR ADMISSION
Left Elbow Olecranon Fracture

## 2021-01-12 NOTE — PROGRESS NOTE ADULT - PROBLEM SELECTOR PLAN 1
PT/OT-NWB LUE splint/sling, ROM shoulder, wrist, hand ok  IS  DVT PPx  Pain Control  Continue Current Tx.  Home today p PT    Zackary Gatica PA-C  Team Pager: #[phone]
PT/OT-NWB LUE splint/sling, ROM shoulder, wrist, hand ok  IS  DVT PPx  Pain Control  Continue Current Tx.  Home today p PT    Zackary Gatica PA-C  Team Pager: #[phone]
continue wound care to left arm  continue PT as tolerated  activity as tolerated
tolerated procedure well  continue PT as tolerated  activity as tolerated
tolerated procedure well  continue PT as tolerated  activity as tolerated

## 2021-01-12 NOTE — PROGRESS NOTE ADULT - PROBLEM SELECTOR PLAN 2
Pain meds as needed  follow for oversedation  GI regimen to prevent constipation.

## 2021-01-12 NOTE — DISCHARGE NOTE NURSING/CASE MANAGEMENT/SOCIAL WORK - PATIENT PORTAL LINK FT
You can access the FollowMyHealth Patient Portal offered by [facility] by registering at the following website: [email or web address]. By joining [employer]’s FollowMyHealth portal, you will also be able to view your health information using other applications (apps) compatible with our system.

## 2021-01-12 NOTE — PROGRESS NOTE ADULT - ASSESSMENT
84 yo woman presents with a left humerus fx scheduled for an Open reduction and internal fixation of the left elbow 
86 yo woman presents with a left humerus fx scheduled for an Open reduction and internal fixation of the left elbow 
A/P: 88y F s/p MF w/ displaced L olecranon fracture      case discussed with Dr. Johnston  Pt with displaced olecranon fracture, risk and benefits to surgery discussed in detail, pt would like to proceed with surgery at this time  OR Today with L Olecranon Fx ORIF with Dr. Johnston  Med cleared for OR  NPO  IV fluids while npo   no chemical DVT ppx   NWB LUE in splint and Sling  analgesia, limit narcs  FU EKG   attending aware and agrees with above 
A/p: 85yFemale s/p ORIF L Olecranon.  VSS. NAD.    
A/p: 85yFemale s/p ORIF L Olecranon.  VSS. NAD.  - Pain control  - DVT ppx A81  - PT/OT  - NWB LUE  - Dispo home today    
84 yo woman presents with a left humerus fx scheduled for an Open reduction and internal fixation of the left elbow

## 2021-01-20 PROCEDURE — 85025 COMPLETE CBC W/AUTO DIFF WBC: CPT

## 2021-01-20 PROCEDURE — 80053 COMPREHEN METABOLIC PANEL: CPT

## 2021-01-20 PROCEDURE — 86901 BLOOD TYPING SEROLOGIC RH(D): CPT

## 2021-01-20 PROCEDURE — 86900 BLOOD TYPING SEROLOGIC ABO: CPT

## 2021-01-20 PROCEDURE — 73070 X-RAY EXAM OF ELBOW: CPT

## 2021-01-20 PROCEDURE — 85610 PROTHROMBIN TIME: CPT

## 2021-01-20 PROCEDURE — 97161 PT EVAL LOW COMPLEX 20 MIN: CPT

## 2021-01-20 PROCEDURE — 99285 EMERGENCY DEPT VISIT HI MDM: CPT | Mod: 25

## 2021-01-20 PROCEDURE — 73552 X-RAY EXAM OF FEMUR 2/>: CPT

## 2021-01-20 PROCEDURE — 80048 BASIC METABOLIC PNL TOTAL CA: CPT

## 2021-01-20 PROCEDURE — 76000 FLUOROSCOPY <1 HR PHYS/QHP: CPT

## 2021-01-20 PROCEDURE — 97166 OT EVAL MOD COMPLEX 45 MIN: CPT

## 2021-01-20 PROCEDURE — 51701 INSERT BLADDER CATHETER: CPT | Mod: XU

## 2021-01-20 PROCEDURE — 85027 COMPLETE CBC AUTOMATED: CPT

## 2021-01-20 PROCEDURE — C1889: CPT

## 2021-01-20 PROCEDURE — U0003: CPT

## 2021-01-20 PROCEDURE — 70450 CT HEAD/BRAIN W/O DYE: CPT

## 2021-01-20 PROCEDURE — U0005: CPT

## 2021-01-20 PROCEDURE — 86769 SARS-COV-2 COVID-19 ANTIBODY: CPT

## 2021-01-20 PROCEDURE — 81001 URINALYSIS AUTO W/SCOPE: CPT

## 2021-01-20 PROCEDURE — 72125 CT NECK SPINE W/O DYE: CPT

## 2021-01-20 PROCEDURE — 96374 THER/PROPH/DIAG INJ IV PUSH: CPT | Mod: XU

## 2021-01-20 PROCEDURE — 83735 ASSAY OF MAGNESIUM: CPT

## 2021-01-20 PROCEDURE — 93005 ELECTROCARDIOGRAM TRACING: CPT

## 2021-01-20 PROCEDURE — 97162 PT EVAL MOD COMPLEX 30 MIN: CPT

## 2021-01-20 PROCEDURE — 71045 X-RAY EXAM CHEST 1 VIEW: CPT

## 2021-01-20 PROCEDURE — 85730 THROMBOPLASTIN TIME PARTIAL: CPT

## 2021-01-20 PROCEDURE — 73060 X-RAY EXAM OF HUMERUS: CPT

## 2021-01-20 PROCEDURE — 86850 RBC ANTIBODY SCREEN: CPT

## 2021-01-20 PROCEDURE — C1713: CPT

## 2021-01-20 PROCEDURE — 73030 X-RAY EXAM OF SHOULDER: CPT

## 2021-01-20 PROCEDURE — 71250 CT THORAX DX C-: CPT

## 2021-01-20 PROCEDURE — 84484 ASSAY OF TROPONIN QUANT: CPT

## 2021-01-20 PROCEDURE — 83880 ASSAY OF NATRIURETIC PEPTIDE: CPT

## 2021-01-20 PROCEDURE — 73502 X-RAY EXAM HIP UNI 2-3 VIEWS: CPT

## 2021-01-25 ENCOUNTER — APPOINTMENT (OUTPATIENT)
Dept: ORTHOPEDIC SURGERY | Facility: CLINIC | Age: 86
End: 2021-01-25
Payer: MEDICARE

## 2021-01-25 DIAGNOSIS — Z82.61 FAMILY HISTORY OF ARTHRITIS: ICD-10-CM

## 2021-01-25 DIAGNOSIS — Z87.39 PERSONAL HISTORY OF OTHER DISEASES OF THE MUSCULOSKELETAL SYSTEM AND CONNECTIVE TISSUE: ICD-10-CM

## 2021-01-25 DIAGNOSIS — S52.022D DISPLACED FRACTURE OF OLECRANON PROCESS W/OUT INTRAARTICULAR EXTENSION OF LEFT ULNA, SUBSEQUENT ENCOUNTER FOR CLOSED FRACTURE WITH ROUTINE HEALING: ICD-10-CM

## 2021-01-25 DIAGNOSIS — Z87.891 PERSONAL HISTORY OF NICOTINE DEPENDENCE: ICD-10-CM

## 2021-01-25 PROCEDURE — 99024 POSTOP FOLLOW-UP VISIT: CPT

## 2021-01-25 PROCEDURE — 73080 X-RAY EXAM OF ELBOW: CPT | Mod: LT

## 2021-01-25 NOTE — ADDENDUM
[FreeTextEntry1] : I, Kamilla Bird wrote this note acting as a scribe for Dr. Liam Johnston on Jan 25, 2021.

## 2021-01-25 NOTE — HISTORY OF PRESENT ILLNESS
[de-identified] : s/p ORIF left olecranon with allograft bone graft, reinforcement of repair with triceps reattachment, application of long-arm splint [de-identified] : The patient is a 85 year old female who returns for the 1st postoperative visit after undergoing ORIF left olecranon with allograft bone graft, reinforcement of repair with triceps reattachment, application of long-arm splint at [facility]. The surgery was performed on 01/10/2021. The patient is recovering at [facility]. She reports mild postoperative pain. She presents in a sling and long arm splint. She is accompanied by her niece.  [de-identified] : Patient is WDWN, alert, and in no acute distress. Breathing is unlabored. She is grossly oriented to person, place, and time. \par \par Left elbow: Incision is healed. There are no signs of infection. Sutures were removed. Normal amount of postoperative edema and tenderness present. \par \par ROM \par \par Left hand FROM\par Sensation is normal [de-identified] : AP, lateral and oblique views of the left elbow were obtained and revealed a displaced segmental olecranon fracture with joint depression stabilized by a Recon wilkins and screws. There is partial retraction of the proximal segment\par   [de-identified] : Sutures were removed. Benzoin and steri strips were applied over the incision sites. Massage the scar with vitamin E oil once the strips have fallen off. Gentle range of motion exercises were encouraged. Patient was encouraged to increase use of the hand. Patient was informed that she no longer needs the sling. Paperwork for the rehab was filled out. Follow up in 6 weeks.

## 2021-01-25 NOTE — END OF VISIT
[FreeTextEntry3] : I, Liam Johnston MD, ordering physician, have read and attest that all the information, medical decision making and discharge instructions within are true and accurate.

## 2021-04-10 ENCOUNTER — TRANSCRIPTION ENCOUNTER (OUTPATIENT)
Age: 86
End: 2021-04-10

## 2021-04-10 ENCOUNTER — EMERGENCY (EMERGENCY)
Facility: HOSPITAL | Age: 86
LOS: 1 days | Discharge: HOME | End: 2021-04-10
Attending: EMERGENCY MEDICINE
Payer: MEDICARE

## 2021-04-10 VITALS
HEIGHT: 63 IN | HEART RATE: 82 BPM | TEMPERATURE: 98 F | RESPIRATION RATE: 20 BRPM | OXYGEN SATURATION: 98 % | SYSTOLIC BLOOD PRESSURE: 136 MMHG | DIASTOLIC BLOOD PRESSURE: 80 MMHG | WEIGHT: 149.91 LBS

## 2021-04-10 DIAGNOSIS — Z96.60 PRESENCE OF UNSPECIFIED ORTHOPEDIC JOINT IMPLANT: Chronic | ICD-10-CM

## 2021-04-10 DIAGNOSIS — Z96.652 PRESENCE OF LEFT ARTIFICIAL KNEE JOINT: Chronic | ICD-10-CM

## 2021-04-10 LAB
ALBUMIN SERPL ELPH-MCNC: 3.8 G/DL — SIGNIFICANT CHANGE UP (ref 3.3–5)
ALP SERPL-CCNC: 119 U/L — SIGNIFICANT CHANGE UP (ref 40–120)
ALT FLD-CCNC: 7 U/L — LOW (ref 10–45)
ANION GAP SERPL CALC-SCNC: 12 MMOL/L — SIGNIFICANT CHANGE UP (ref 5–17)
AST SERPL-CCNC: 25 U/L — SIGNIFICANT CHANGE UP (ref 10–40)
BASE EXCESS BLDV CALC-SCNC: 1.3 MMOL/L — SIGNIFICANT CHANGE UP (ref -2–2)
BASOPHILS # BLD AUTO: 0.02 K/UL — SIGNIFICANT CHANGE UP (ref 0–0.2)
BASOPHILS NFR BLD AUTO: 0.3 % — SIGNIFICANT CHANGE UP (ref 0–2)
BILIRUB SERPL-MCNC: 0.6 MG/DL — SIGNIFICANT CHANGE UP (ref 0.2–1.2)
BUN SERPL-MCNC: 16 MG/DL — SIGNIFICANT CHANGE UP (ref 7–23)
CA-I SERPL-SCNC: 1.14 MMOL/L — SIGNIFICANT CHANGE UP (ref 1.12–1.3)
CALCIUM SERPL-MCNC: 9.5 MG/DL — SIGNIFICANT CHANGE UP (ref 8.4–10.5)
CHLORIDE BLDV-SCNC: 114 MMOL/L — HIGH (ref 96–108)
CHLORIDE SERPL-SCNC: 107 MMOL/L — SIGNIFICANT CHANGE UP (ref 96–108)
CO2 BLDV-SCNC: 28 MMOL/L — SIGNIFICANT CHANGE UP (ref 22–30)
CO2 SERPL-SCNC: 20 MMOL/L — LOW (ref 22–31)
CREAT SERPL-MCNC: 0.62 MG/DL — SIGNIFICANT CHANGE UP (ref 0.5–1.3)
CRP SERPL-MCNC: 7 MG/L — HIGH (ref 0–4)
EOSINOPHIL # BLD AUTO: 0.09 K/UL — SIGNIFICANT CHANGE UP (ref 0–0.5)
EOSINOPHIL NFR BLD AUTO: 1.4 % — SIGNIFICANT CHANGE UP (ref 0–6)
GAS PNL BLDV: 137 MMOL/L — SIGNIFICANT CHANGE UP (ref 135–145)
GAS PNL BLDV: SIGNIFICANT CHANGE UP
GAS PNL BLDV: SIGNIFICANT CHANGE UP
GLUCOSE BLDV-MCNC: 105 MG/DL — HIGH (ref 70–99)
GLUCOSE SERPL-MCNC: 109 MG/DL — HIGH (ref 70–99)
HCO3 BLDV-SCNC: 26 MMOL/L — SIGNIFICANT CHANGE UP (ref 21–29)
HCT VFR BLD CALC: 40.7 % — SIGNIFICANT CHANGE UP (ref 34.5–45)
HCT VFR BLDA CALC: 40 % — SIGNIFICANT CHANGE UP (ref 39–50)
HGB BLD CALC-MCNC: 12.9 G/DL — SIGNIFICANT CHANGE UP (ref 11.5–15.5)
HGB BLD-MCNC: 12.5 G/DL — SIGNIFICANT CHANGE UP (ref 11.5–15.5)
IMM GRANULOCYTES NFR BLD AUTO: 0.3 % — SIGNIFICANT CHANGE UP (ref 0–1.5)
LACTATE BLDV-MCNC: 1.5 MMOL/L — SIGNIFICANT CHANGE UP (ref 0.7–2)
LYMPHOCYTES # BLD AUTO: 1.66 K/UL — SIGNIFICANT CHANGE UP (ref 1–3.3)
LYMPHOCYTES # BLD AUTO: 26 % — SIGNIFICANT CHANGE UP (ref 13–44)
MCHC RBC-ENTMCNC: 27.3 PG — SIGNIFICANT CHANGE UP (ref 27–34)
MCHC RBC-ENTMCNC: 30.7 GM/DL — LOW (ref 32–36)
MCV RBC AUTO: 88.9 FL — SIGNIFICANT CHANGE UP (ref 80–100)
MONOCYTES # BLD AUTO: 0.57 K/UL — SIGNIFICANT CHANGE UP (ref 0–0.9)
MONOCYTES NFR BLD AUTO: 8.9 % — SIGNIFICANT CHANGE UP (ref 2–14)
NEUTROPHILS # BLD AUTO: 4.03 K/UL — SIGNIFICANT CHANGE UP (ref 1.8–7.4)
NEUTROPHILS NFR BLD AUTO: 63.1 % — SIGNIFICANT CHANGE UP (ref 43–77)
NRBC # BLD: 0 /100 WBCS — SIGNIFICANT CHANGE UP (ref 0–0)
PCO2 BLDV: 47 MMHG — HIGH (ref 39–42)
PH BLDV: 7.37 — SIGNIFICANT CHANGE UP (ref 7.35–7.45)
PLATELET # BLD AUTO: 308 K/UL — SIGNIFICANT CHANGE UP (ref 150–400)
PO2 BLDV: 29 MMHG — SIGNIFICANT CHANGE UP (ref 25–45)
POTASSIUM BLDV-SCNC: 4 MMOL/L — SIGNIFICANT CHANGE UP (ref 3.5–5.3)
POTASSIUM SERPL-MCNC: 4.4 MMOL/L — SIGNIFICANT CHANGE UP (ref 3.5–5.3)
POTASSIUM SERPL-SCNC: 4.4 MMOL/L — SIGNIFICANT CHANGE UP (ref 3.5–5.3)
PROT SERPL-MCNC: 8.2 G/DL — SIGNIFICANT CHANGE UP (ref 6–8.3)
RBC # BLD: 4.58 M/UL — SIGNIFICANT CHANGE UP (ref 3.8–5.2)
RBC # FLD: 14.2 % — SIGNIFICANT CHANGE UP (ref 10.3–14.5)
SAO2 % BLDV: 48 % — LOW (ref 67–88)
SODIUM SERPL-SCNC: 139 MMOL/L — SIGNIFICANT CHANGE UP (ref 135–145)
WBC # BLD: 6.39 K/UL — SIGNIFICANT CHANGE UP (ref 3.8–10.5)
WBC # FLD AUTO: 6.39 K/UL — SIGNIFICANT CHANGE UP (ref 3.8–10.5)

## 2021-04-10 PROCEDURE — 73560 X-RAY EXAM OF KNEE 1 OR 2: CPT | Mod: 26,LT

## 2021-04-10 PROCEDURE — 73590 X-RAY EXAM OF LOWER LEG: CPT | Mod: 26,LT

## 2021-04-10 PROCEDURE — 99284 EMERGENCY DEPT VISIT MOD MDM: CPT | Mod: CS,GC

## 2021-04-10 PROCEDURE — 73610 X-RAY EXAM OF ANKLE: CPT | Mod: 26,LT

## 2021-04-10 NOTE — ED PROVIDER NOTE - CLINICAL SUMMARY MEDICAL DECISION MAKING FREE TEXT BOX
86 y/o F PMH hypothyroid presents to ED c/o LLE pain, redness and swelling ongoing for 3 weeks. denies trouble walking at home f/c. ddx cellulitis, DVT, hematoma, lower suspicion fx dislocation. plan labs xrs US dispo pending 84 y/o F PMH hypothyroid presents to ED c/o LLE pain, redness and swelling ongoing for 3 weeks. denies trouble walking at home f/c. ddx cellulitis, DVT, hematoma, lower suspicion fx dislocation. plan labs xrs US dispo pending     Attn - pt with recent fall and likely hematoma anterior L tibia, but with swelling of LL leg.  - US to r/o DVT, labs.  xrays.  reassess and ambulate. 86 y/o F PMH hypothyroid presents to ED c/o LLE pain, redness and swelling ongoing for 3 weeks s/p fall. denies trouble walking at home f/c. ddx cellulitis, DVT, hematoma, lower suspicion fx dislocation. plan labs xrs US dispo pending     Attn - pt with recent fall and likely hematoma anterior L tibia, but with swelling of LL leg.  - US to r/o DVT, labs.  xrays.  reassess and ambulate.

## 2021-04-10 NOTE — ED PROVIDER NOTE - PATIENT PORTAL LINK FT
You can access the FollowMyHealth Patient Portal offered by [facility] by registering at the following website: [email or web address]. By joining [employer]’s FollowMyHealth portal, you will also be able to view your health information using other applications (apps) compatible with our system. You can access the FollowMyHealth Patient Portal offered by [facility] by registering at the following website: [email or web address]. By joining [employer]’s FollowMyHealth portal, you will also be able to view your health information using other applications (apps) compatible with our system.

## 2021-04-10 NOTE — ED ADULT NURSE NOTE - NSIMPLEMENTINTERV_GEN_ALL_ED
Implemented All Fall Risk Interventions:  [place] to call system. Call bell, personal items and telephone within reach. Instruct patient to call for assistance. Room bathroom lighting operational. Non-slip footwear when patient is off stretcher. Physically safe environment: no spills, clutter or unnecessary equipment. Stretcher in lowest position, wheels locked, appropriate side rails in place. Provide visual cue, wrist band, yellow gown, etc. Monitor gait and stability. Monitor for mental status changes and reorient to person, place, and time. Review medications for side effects contributing to fall risk. Reinforce activity limits and safety measures with patient and family.

## 2021-04-10 NOTE — ED PROVIDER NOTE - NSFOLLOWUPINSTRUCTIONS_ED_ALL_ED_FT
Rest and stay well hydrated.   Follow-up with your PMD in 3-5 days for recheck.   Return to the ED for worsening pain, fevers, difficulty walking or any new concerning symptoms. Rest and stay well hydrated.   Follow-up with your PMD in 3-5 days for recheck of your leg.   Return to the ED for worsening pain, fevers, difficulty walking or any new concerning symptoms.  Tylenol 500 mg every 8 hours as needed for pain

## 2021-04-10 NOTE — ED PROVIDER NOTE - OBJECTIVE STATEMENT
86 y/o F PMH hypothyroid presents to ED c/o LLE pain, redness and swelling ongoing for 3 weeks. denies trouble walking at home. Pt states she fell a few weeks prior had a broken bone in her arm but did not need splint. Denies recent surgery, long travel. no hx DM. Denies cp, sob, DELEON, abd pain, n/v/d, f/c. no recent abx. 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 84 y/o F PMH hypothyroid presents to ED c/o LLE pain, redness and swelling ongoing for 3 weeks. denies trouble walking at home. Pt states she fell a few weeks prior had a broken bone in her arm but did not need splint. Denies recent surgery, long travel. no hx DM. Denies cp, sob, DELEON, abd pain, n/v/d, f/c. no recent abx. 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     Attn - pt seen in G8 - agree with above - pt c/o swelling of left lower leg x 1-2 weeks with occasional pain. no fever, chills, numbness or weakness.  able to wt bear without difficulty.  prior surgery on leg with "naomi" and TKA.  no chest pain or deleon/sob.

## 2021-04-10 NOTE — ED PROVIDER NOTE - PROGRESS NOTE DETAILS
Ford, PGY2 - spoke with US - wrong order placed. correct order placed. asked about expediting, tech reports she will try. pt was reassessed, states feels ok, discussed labs and XR results with pt. pt verbalized understanding, agrees with plan, all questions answered. Ford, PGY2 - pt ambulating in the ED Ford, PGY2 - US negative. pending walk trial re: dispo Ford, PGY2 - US negative for DVT. no leg tenderness warmth on repeat exam. pt ambulatory in the ED w assistance, uses walker at baseline. Ford, PGY2 - discussed with patient's niece plan for discharge home, agrees with ambulance for transport, confirmed can receive pt at home Patient reassessed for change of shift.  No appreciable change to swelling/erythema, duplex WNL, ESR noted but nonspecific -- no fevers, chills, vomiting, or change to nature of erythema/swelling as per patient, WBC wnl, no warmth to legs b/l.  Tenderness to palpation is only at anterior shin at focal hematoma.  At this time clinical exam is not c/w cellulitis, abscess, or other infectious process.  Seems more likely hematoma with concurrent b/l LE edema.  Compartments soft and distal extremity NVI.  Patient is awaiting a nonemergent ambulette but is willing to be seen by SW this morning in the ED as she states she is having difficulty getting to her doctors apopintments.  I gave her expedited medicine followup and strict precautions to return to the ED including fevers, chills, worsening erythema/swelling, or any other concerns.  --JF Elysia: Patient expresses that she has difficulty getting around, and needs help at home. Will obtain PT consult and SW. Attending note (Andrey): patient endorsed to me at signout; awaiting SW/PT eval; however, spoke with patient's niece who states patient has FT aides (2 aides, always some one with her) and is at her baseline.  Will re-arrange for transport home; is stable for dc.

## 2021-04-10 NOTE — ED ADULT TRIAGE NOTE - CHIEF COMPLAINT QUOTE
fell 4 months ago, now noticed worsening swelling, redness, itchiness in left foot.    hx of hypothyroidism

## 2021-04-10 NOTE — ED PROVIDER NOTE - PHYSICAL EXAMINATION
Gen: well developed female NAD   HEENT: NCAT, EOMI, no nasal discharge, mucous membranes moist  CV: RRR, +S1/S2, no M/R/G  Resp: CTAB, no W/R/R  GI: Abdomen soft non-distended, NTTP, no masses  MSK: no open wounds +LLE with erythema nonpitting edema > RLE and TTP calf   Neuro: A&Ox4, following commands, moving all four extremities spontaneously  Psych: appropriate mood Gen: well developed female NAD   HEENT: NCAT, EOMI, no nasal discharge, mucous membranes moist  CV: RRR, +S1/S2, no M/R/G  Resp: CTAB, no W/R/R  GI: Abdomen soft non-distended, NTTP, no masses  MSK: no open wounds +LLE with erythema nonpitting edema > RLE and TTP calf   Neuro: A&Ox4, following commands, moving all four extremities spontaneously  Psych: appropriate mood    Attn - alert, NAD, no pallor or jaundice, PERRL 3 mm, moist mm, skin - warm and dry, Lungs - clear, no w/r/r, good BS bilaterally, Cor - rr, 1/6 GRACIA, no rub, Abdo - ND, soft, NT, no HSM, no CVAT, no guarding or rebound. Extremities - scar anterior lateral L knee, swelling left lower leg with focal swelling anterior mid tib without tenderness, distal pulses intact and symmetrical, Neuro - intact and non-focal Gen: well developed female NAD   HEENT: NCAT, EOMI, no nasal discharge, mucous membranes moist  CV: RRR, +S1/S2, no M/R/G  Resp: CTAB, no W/R/R  GI: Abdomen soft non-distended, NTTP, no masses  MSK: no open wounds +LLE with erythema nonpitting edema > RLE and TTP calf. no warmth  Neuro: A&Ox4, following commands, moving all four extremities spontaneously  Psych: appropriate mood    Attn - alert, NAD, no pallor or jaundice, PERRL 3 mm, moist mm, skin - warm and dry, Lungs - clear, no w/r/r, good BS bilaterally, Cor - rr, 1/6 GRACIA, no rub, Abdo - ND, soft, NT, no HSM, no CVAT, no guarding or rebound. Extremities - scar anterior lateral L knee, swelling left lower leg with focal swelling anterior mid tib without tenderness, distal pulses intact and symmetrical, Neuro - intact and non-focal

## 2021-04-10 NOTE — ED ADULT NURSE NOTE - OBJECTIVE STATEMENT
Pt is an 84 y/o F, PMH hypothyroid, presenting to ED c/o L foot swelling and itchiness x 3 weeks. Pt states she had a fall 4 months ago, but "did not like the way my foot was looking today". Positive distal pulses, sensation intact, able to move toes and legs. Pt denies difficulty ambulating at home and states she has been using a walker which is her baseline. L leg non-tender to touch, no heat to leg, redness noted. Pt endorses mild and intermittent pain to leg but mostly itchiness. Pt denies headache, dizziness, chest pain, palpitations, cough, SOB, abdominal pain, n/v/d, urinary symptoms, fevers, chills, weakness at this time. Pt is A&Ox4 with intermittent episodes of repetitive questions and confusion. Pt changed into hospital gown with call bell at bedside and safety maintained.

## 2021-04-10 NOTE — ED PROVIDER NOTE - NSPTACCESSSVCSAPPTDETAILS_ED_ALL_ED_FT
URGENT -- needs wound check for LLE hematoma/swelling without evidence of deep venous thrombosis  Needs new physician and/or [facility]

## 2021-04-11 VITALS
HEART RATE: 60 BPM | OXYGEN SATURATION: 98 % | DIASTOLIC BLOOD PRESSURE: 82 MMHG | TEMPERATURE: 98 F | RESPIRATION RATE: 18 BRPM | SYSTOLIC BLOOD PRESSURE: 129 MMHG

## 2021-04-11 LAB
ERYTHROCYTE [SEDIMENTATION RATE] IN BLOOD: 69 MM/HR — HIGH (ref 0–20)
SARS-COV-2 RNA SPEC QL NAA+PROBE: SIGNIFICANT CHANGE UP

## 2021-04-11 PROCEDURE — 86140 C-REACTIVE PROTEIN: CPT

## 2021-04-11 PROCEDURE — 85018 HEMOGLOBIN: CPT

## 2021-04-11 PROCEDURE — 87040 BLOOD CULTURE FOR BACTERIA: CPT

## 2021-04-11 PROCEDURE — 85652 RBC SED RATE AUTOMATED: CPT

## 2021-04-11 PROCEDURE — 93971 EXTREMITY STUDY: CPT | Mod: 26,LT

## 2021-04-11 PROCEDURE — 73610 X-RAY EXAM OF ANKLE: CPT

## 2021-04-11 PROCEDURE — U0005: CPT

## 2021-04-11 PROCEDURE — 99284 EMERGENCY DEPT VISIT MOD MDM: CPT | Mod: 25

## 2021-04-11 PROCEDURE — 73560 X-RAY EXAM OF KNEE 1 OR 2: CPT

## 2021-04-11 PROCEDURE — 73590 X-RAY EXAM OF LOWER LEG: CPT

## 2021-04-11 PROCEDURE — 93971 EXTREMITY STUDY: CPT

## 2021-04-11 PROCEDURE — 83605 ASSAY OF LACTIC ACID: CPT

## 2021-04-11 PROCEDURE — 82803 BLOOD GASES ANY COMBINATION: CPT

## 2021-04-11 PROCEDURE — 82947 ASSAY GLUCOSE BLOOD QUANT: CPT

## 2021-04-11 PROCEDURE — U0003: CPT

## 2021-04-11 PROCEDURE — 85014 HEMATOCRIT: CPT

## 2021-04-11 PROCEDURE — 84295 ASSAY OF SERUM SODIUM: CPT

## 2021-04-11 PROCEDURE — 82435 ASSAY OF BLOOD CHLORIDE: CPT

## 2021-04-11 PROCEDURE — 84132 ASSAY OF SERUM POTASSIUM: CPT

## 2021-04-11 PROCEDURE — 85025 COMPLETE CBC W/AUTO DIFF WBC: CPT

## 2021-04-11 PROCEDURE — 82330 ASSAY OF CALCIUM: CPT

## 2021-04-11 PROCEDURE — 93926 LOWER EXTREMITY STUDY: CPT

## 2021-04-11 PROCEDURE — 80053 COMPREHEN METABOLIC PANEL: CPT

## 2021-04-11 RX ORDER — ACETAMINOPHEN 500 MG
975 TABLET ORAL ONCE
Refills: 0 | Status: COMPLETED | OUTPATIENT
Start: 2021-04-11 | End: 2021-04-11

## 2021-04-11 RX ADMIN — Medication 975 MILLIGRAM(S): at 11:20

## 2021-04-11 NOTE — ED ADULT NURSE REASSESSMENT NOTE - NS ED NURSE REASSESS COMMENT FT1
PT ambulated to bathroom with steady gait, assisted pt, c/o of back pain, Andrey HENDERSON aware, meds to be administered per order. VSS,

## 2021-04-11 NOTE — ED ADULT NURSE REASSESSMENT NOTE - NS ED NURSE REASSESS COMMENT FT1
Pt ambulated to the bathroom with walker and RN at side. Steady and slow gait noted, safety maintained.

## 2021-04-11 NOTE — ED ADULT NURSE REASSESSMENT NOTE - NS ED NURSE REASSESS COMMENT FT1
Pt remains AAOx3, NAD, resp nonlabored, resting comfortably in bed with call bell at bedside. Pt resting comfortably with eyes closed, arouses easily to voice and touch. Pt denies headache, dizziness, chest pain, palpitations, SOB, abd pain, n/v/d, urinary symptoms, fevers, chills at this time. Pt awaiting dispo. Safety maintained.

## 2021-04-16 LAB
CULTURE RESULTS: SIGNIFICANT CHANGE UP
CULTURE RESULTS: SIGNIFICANT CHANGE UP
SPECIMEN SOURCE: SIGNIFICANT CHANGE UP
SPECIMEN SOURCE: SIGNIFICANT CHANGE UP

## 2021-05-23 ENCOUNTER — TRANSCRIPTION ENCOUNTER (OUTPATIENT)
Age: 86
End: 2021-05-23

## 2023-04-12 NOTE — OCCUPATIONAL THERAPY INITIAL EVALUATION ADULT - FINE MOTOR COORDINATION, RIGHT HAND THUMB/FINGER OPPOSITION SKILLS, OT EVAL
See [employer] message. Last office visit 03/02/23    Routing to provider to review and advise. Med pended with pharmacy.    Estefanía Hernandez RN       normal performance

## 2023-06-30 NOTE — ED PROVIDER NOTE - WR ORDER DATE AND TIME 1
Alerted for + gram positive cocci in chains  On zosyn   Repeat blood culture   Add vancomycin   Consider ID consult after final culture resulted     09-Jan-2021 20:45

## 2023-12-29 ENCOUNTER — INPATIENT (INPATIENT)
Facility: HOSPITAL | Age: 88
LOS: 5 days | Discharge: NURSING, REHAB OR LONG-TERM CARE | DRG: 866 | End: 2024-01-04
Attending: INTERNAL MEDICINE | Admitting: INTERNAL MEDICINE
Payer: MEDICARE

## 2023-12-29 VITALS
OXYGEN SATURATION: 97 % | WEIGHT: 130.07 LBS | TEMPERATURE: 98 F | RESPIRATION RATE: 18 BRPM | HEART RATE: 53 BPM | SYSTOLIC BLOOD PRESSURE: 103 MMHG | HEIGHT: 62 IN | DIASTOLIC BLOOD PRESSURE: 3 MMHG

## 2023-12-29 DIAGNOSIS — Z96.652 PRESENCE OF LEFT ARTIFICIAL KNEE JOINT: Chronic | ICD-10-CM

## 2023-12-29 DIAGNOSIS — Z96.60 PRESENCE OF UNSPECIFIED ORTHOPEDIC JOINT IMPLANT: Chronic | ICD-10-CM

## 2023-12-29 DIAGNOSIS — M25.559 PAIN IN UNSPECIFIED HIP: ICD-10-CM

## 2023-12-29 LAB
ALBUMIN SERPL ELPH-MCNC: 3.7 G/DL — SIGNIFICANT CHANGE UP (ref 3.3–5)
ALBUMIN SERPL ELPH-MCNC: 3.7 G/DL — SIGNIFICANT CHANGE UP (ref 3.3–5)
ALP SERPL-CCNC: 104 U/L — SIGNIFICANT CHANGE UP (ref 40–120)
ALP SERPL-CCNC: 104 U/L — SIGNIFICANT CHANGE UP (ref 40–120)
ALT FLD-CCNC: 9 U/L — LOW (ref 10–45)
ALT FLD-CCNC: 9 U/L — LOW (ref 10–45)
ANION GAP SERPL CALC-SCNC: 11 MMOL/L — SIGNIFICANT CHANGE UP (ref 5–17)
ANION GAP SERPL CALC-SCNC: 11 MMOL/L — SIGNIFICANT CHANGE UP (ref 5–17)
AST SERPL-CCNC: 18 U/L — SIGNIFICANT CHANGE UP (ref 10–40)
AST SERPL-CCNC: 18 U/L — SIGNIFICANT CHANGE UP (ref 10–40)
BASOPHILS # BLD AUTO: 0.02 K/UL — SIGNIFICANT CHANGE UP (ref 0–0.2)
BASOPHILS # BLD AUTO: 0.02 K/UL — SIGNIFICANT CHANGE UP (ref 0–0.2)
BASOPHILS NFR BLD AUTO: 0.3 % — SIGNIFICANT CHANGE UP (ref 0–2)
BASOPHILS NFR BLD AUTO: 0.3 % — SIGNIFICANT CHANGE UP (ref 0–2)
BILIRUB SERPL-MCNC: 0.6 MG/DL — SIGNIFICANT CHANGE UP (ref 0.2–1.2)
BILIRUB SERPL-MCNC: 0.6 MG/DL — SIGNIFICANT CHANGE UP (ref 0.2–1.2)
BUN SERPL-MCNC: 15 MG/DL — SIGNIFICANT CHANGE UP (ref 7–23)
BUN SERPL-MCNC: 15 MG/DL — SIGNIFICANT CHANGE UP (ref 7–23)
CALCIUM SERPL-MCNC: 9 MG/DL — SIGNIFICANT CHANGE UP (ref 8.4–10.5)
CALCIUM SERPL-MCNC: 9 MG/DL — SIGNIFICANT CHANGE UP (ref 8.4–10.5)
CHLORIDE SERPL-SCNC: 104 MMOL/L — SIGNIFICANT CHANGE UP (ref 96–108)
CHLORIDE SERPL-SCNC: 104 MMOL/L — SIGNIFICANT CHANGE UP (ref 96–108)
CO2 SERPL-SCNC: 22 MMOL/L — SIGNIFICANT CHANGE UP (ref 22–31)
CO2 SERPL-SCNC: 22 MMOL/L — SIGNIFICANT CHANGE UP (ref 22–31)
CREAT SERPL-MCNC: 0.78 MG/DL — SIGNIFICANT CHANGE UP (ref 0.5–1.3)
CREAT SERPL-MCNC: 0.78 MG/DL — SIGNIFICANT CHANGE UP (ref 0.5–1.3)
EGFR: 73 ML/MIN/1.73M2 — SIGNIFICANT CHANGE UP
EGFR: 73 ML/MIN/1.73M2 — SIGNIFICANT CHANGE UP
EOSINOPHIL # BLD AUTO: 0.03 K/UL — SIGNIFICANT CHANGE UP (ref 0–0.5)
EOSINOPHIL # BLD AUTO: 0.03 K/UL — SIGNIFICANT CHANGE UP (ref 0–0.5)
EOSINOPHIL NFR BLD AUTO: 0.5 % — SIGNIFICANT CHANGE UP (ref 0–6)
EOSINOPHIL NFR BLD AUTO: 0.5 % — SIGNIFICANT CHANGE UP (ref 0–6)
FLUAV AG NPH QL: DETECTED
FLUAV AG NPH QL: DETECTED
FLUBV AG NPH QL: SIGNIFICANT CHANGE UP
FLUBV AG NPH QL: SIGNIFICANT CHANGE UP
GLUCOSE SERPL-MCNC: 94 MG/DL — SIGNIFICANT CHANGE UP (ref 70–99)
GLUCOSE SERPL-MCNC: 94 MG/DL — SIGNIFICANT CHANGE UP (ref 70–99)
HCT VFR BLD CALC: 39.8 % — SIGNIFICANT CHANGE UP (ref 34.5–45)
HCT VFR BLD CALC: 39.8 % — SIGNIFICANT CHANGE UP (ref 34.5–45)
HGB BLD-MCNC: 12.7 G/DL — SIGNIFICANT CHANGE UP (ref 11.5–15.5)
HGB BLD-MCNC: 12.7 G/DL — SIGNIFICANT CHANGE UP (ref 11.5–15.5)
IMM GRANULOCYTES NFR BLD AUTO: 0.5 % — SIGNIFICANT CHANGE UP (ref 0–0.9)
IMM GRANULOCYTES NFR BLD AUTO: 0.5 % — SIGNIFICANT CHANGE UP (ref 0–0.9)
LYMPHOCYTES # BLD AUTO: 0.56 K/UL — LOW (ref 1–3.3)
LYMPHOCYTES # BLD AUTO: 0.56 K/UL — LOW (ref 1–3.3)
LYMPHOCYTES # BLD AUTO: 8.6 % — LOW (ref 13–44)
LYMPHOCYTES # BLD AUTO: 8.6 % — LOW (ref 13–44)
MAGNESIUM SERPL-MCNC: 2.5 MG/DL — SIGNIFICANT CHANGE UP (ref 1.6–2.6)
MAGNESIUM SERPL-MCNC: 2.5 MG/DL — SIGNIFICANT CHANGE UP (ref 1.6–2.6)
MCHC RBC-ENTMCNC: 26.8 PG — LOW (ref 27–34)
MCHC RBC-ENTMCNC: 26.8 PG — LOW (ref 27–34)
MCHC RBC-ENTMCNC: 31.9 GM/DL — LOW (ref 32–36)
MCHC RBC-ENTMCNC: 31.9 GM/DL — LOW (ref 32–36)
MCV RBC AUTO: 84.1 FL — SIGNIFICANT CHANGE UP (ref 80–100)
MCV RBC AUTO: 84.1 FL — SIGNIFICANT CHANGE UP (ref 80–100)
MONOCYTES # BLD AUTO: 0.49 K/UL — SIGNIFICANT CHANGE UP (ref 0–0.9)
MONOCYTES # BLD AUTO: 0.49 K/UL — SIGNIFICANT CHANGE UP (ref 0–0.9)
MONOCYTES NFR BLD AUTO: 7.6 % — SIGNIFICANT CHANGE UP (ref 2–14)
MONOCYTES NFR BLD AUTO: 7.6 % — SIGNIFICANT CHANGE UP (ref 2–14)
NEUTROPHILS # BLD AUTO: 5.36 K/UL — SIGNIFICANT CHANGE UP (ref 1.8–7.4)
NEUTROPHILS # BLD AUTO: 5.36 K/UL — SIGNIFICANT CHANGE UP (ref 1.8–7.4)
NEUTROPHILS NFR BLD AUTO: 82.5 % — HIGH (ref 43–77)
NEUTROPHILS NFR BLD AUTO: 82.5 % — HIGH (ref 43–77)
NRBC # BLD: 0 /100 WBCS — SIGNIFICANT CHANGE UP (ref 0–0)
NRBC # BLD: 0 /100 WBCS — SIGNIFICANT CHANGE UP (ref 0–0)
PLATELET # BLD AUTO: 223 K/UL — SIGNIFICANT CHANGE UP (ref 150–400)
PLATELET # BLD AUTO: 223 K/UL — SIGNIFICANT CHANGE UP (ref 150–400)
POTASSIUM SERPL-MCNC: 4.1 MMOL/L — SIGNIFICANT CHANGE UP (ref 3.5–5.3)
POTASSIUM SERPL-MCNC: 4.1 MMOL/L — SIGNIFICANT CHANGE UP (ref 3.5–5.3)
POTASSIUM SERPL-SCNC: 4.1 MMOL/L — SIGNIFICANT CHANGE UP (ref 3.5–5.3)
POTASSIUM SERPL-SCNC: 4.1 MMOL/L — SIGNIFICANT CHANGE UP (ref 3.5–5.3)
PROT SERPL-MCNC: 7.9 G/DL — SIGNIFICANT CHANGE UP (ref 6–8.3)
PROT SERPL-MCNC: 7.9 G/DL — SIGNIFICANT CHANGE UP (ref 6–8.3)
RBC # BLD: 4.73 M/UL — SIGNIFICANT CHANGE UP (ref 3.8–5.2)
RBC # BLD: 4.73 M/UL — SIGNIFICANT CHANGE UP (ref 3.8–5.2)
RBC # FLD: 13.7 % — SIGNIFICANT CHANGE UP (ref 10.3–14.5)
RBC # FLD: 13.7 % — SIGNIFICANT CHANGE UP (ref 10.3–14.5)
RSV RNA NPH QL NAA+NON-PROBE: SIGNIFICANT CHANGE UP
RSV RNA NPH QL NAA+NON-PROBE: SIGNIFICANT CHANGE UP
SARS-COV-2 RNA SPEC QL NAA+PROBE: SIGNIFICANT CHANGE UP
SARS-COV-2 RNA SPEC QL NAA+PROBE: SIGNIFICANT CHANGE UP
SODIUM SERPL-SCNC: 137 MMOL/L — SIGNIFICANT CHANGE UP (ref 135–145)
SODIUM SERPL-SCNC: 137 MMOL/L — SIGNIFICANT CHANGE UP (ref 135–145)
TROPONIN T, HIGH SENSITIVITY RESULT: 12 NG/L — SIGNIFICANT CHANGE UP (ref 0–51)
TROPONIN T, HIGH SENSITIVITY RESULT: 12 NG/L — SIGNIFICANT CHANGE UP (ref 0–51)
WBC # BLD: 6.49 K/UL — SIGNIFICANT CHANGE UP (ref 3.8–10.5)
WBC # BLD: 6.49 K/UL — SIGNIFICANT CHANGE UP (ref 3.8–10.5)
WBC # FLD AUTO: 6.49 K/UL — SIGNIFICANT CHANGE UP (ref 3.8–10.5)
WBC # FLD AUTO: 6.49 K/UL — SIGNIFICANT CHANGE UP (ref 3.8–10.5)

## 2023-12-29 PROCEDURE — 99285 EMERGENCY DEPT VISIT HI MDM: CPT | Mod: GC

## 2023-12-29 PROCEDURE — 76377 3D RENDER W/INTRP POSTPROCES: CPT | Mod: 26

## 2023-12-29 PROCEDURE — 72170 X-RAY EXAM OF PELVIS: CPT | Mod: 26,59

## 2023-12-29 PROCEDURE — 72192 CT PELVIS W/O DYE: CPT | Mod: 26,MA

## 2023-12-29 PROCEDURE — 73562 X-RAY EXAM OF KNEE 3: CPT | Mod: 26,LT

## 2023-12-29 PROCEDURE — 70450 CT HEAD/BRAIN W/O DYE: CPT | Mod: 26,MA

## 2023-12-29 PROCEDURE — 73502 X-RAY EXAM HIP UNI 2-3 VIEWS: CPT | Mod: 26,LT

## 2023-12-29 PROCEDURE — 71045 X-RAY EXAM CHEST 1 VIEW: CPT | Mod: 26

## 2023-12-29 PROCEDURE — 73552 X-RAY EXAM OF FEMUR 2/>: CPT | Mod: 26,LT

## 2023-12-29 RX ORDER — ACETAMINOPHEN 500 MG
1000 TABLET ORAL ONCE
Refills: 0 | Status: COMPLETED | OUTPATIENT
Start: 2023-12-29 | End: 2023-12-29

## 2023-12-29 RX ORDER — HYDROMORPHONE HYDROCHLORIDE 2 MG/ML
0.2 INJECTION INTRAMUSCULAR; INTRAVENOUS; SUBCUTANEOUS EVERY 8 HOURS
Refills: 0 | Status: DISCONTINUED | OUTPATIENT
Start: 2023-12-29 | End: 2023-12-31

## 2023-12-29 RX ORDER — LEVOTHYROXINE SODIUM 125 MCG
75 TABLET ORAL DAILY
Refills: 0 | Status: DISCONTINUED | OUTPATIENT
Start: 2023-12-29 | End: 2024-01-04

## 2023-12-29 RX ORDER — MORPHINE SULFATE 50 MG/1
4 CAPSULE, EXTENDED RELEASE ORAL ONCE
Refills: 0 | Status: DISCONTINUED | OUTPATIENT
Start: 2023-12-29 | End: 2023-12-29

## 2023-12-29 RX ORDER — PANTOPRAZOLE SODIUM 20 MG/1
40 TABLET, DELAYED RELEASE ORAL
Refills: 0 | Status: DISCONTINUED | OUTPATIENT
Start: 2023-12-29 | End: 2024-01-04

## 2023-12-29 RX ORDER — ASPIRIN/CALCIUM CARB/MAGNESIUM 324 MG
81 TABLET ORAL DAILY
Refills: 0 | Status: DISCONTINUED | OUTPATIENT
Start: 2023-12-29 | End: 2024-01-04

## 2023-12-29 RX ORDER — SENNA PLUS 8.6 MG/1
2 TABLET ORAL AT BEDTIME
Refills: 0 | Status: DISCONTINUED | OUTPATIENT
Start: 2023-12-29 | End: 2024-01-04

## 2023-12-29 RX ORDER — HEPARIN SODIUM 5000 [USP'U]/ML
5000 INJECTION INTRAVENOUS; SUBCUTANEOUS EVERY 12 HOURS
Refills: 0 | Status: DISCONTINUED | OUTPATIENT
Start: 2023-12-29 | End: 2024-01-04

## 2023-12-29 RX ORDER — ATORVASTATIN CALCIUM 80 MG/1
40 TABLET, FILM COATED ORAL AT BEDTIME
Refills: 0 | Status: DISCONTINUED | OUTPATIENT
Start: 2023-12-29 | End: 2024-01-04

## 2023-12-29 RX ORDER — OXYCODONE HYDROCHLORIDE 5 MG/1
5 TABLET ORAL EVERY 8 HOURS
Refills: 0 | Status: DISCONTINUED | OUTPATIENT
Start: 2023-12-29 | End: 2023-12-31

## 2023-12-29 RX ADMIN — Medication 400 MILLIGRAM(S): at 11:20

## 2023-12-29 RX ADMIN — ATORVASTATIN CALCIUM 40 MILLIGRAM(S): 80 TABLET, FILM COATED ORAL at 22:05

## 2023-12-29 RX ADMIN — MORPHINE SULFATE 4 MILLIGRAM(S): 50 CAPSULE, EXTENDED RELEASE ORAL at 18:17

## 2023-12-29 RX ADMIN — HYDROMORPHONE HYDROCHLORIDE 0.2 MILLIGRAM(S): 2 INJECTION INTRAMUSCULAR; INTRAVENOUS; SUBCUTANEOUS at 19:22

## 2023-12-29 RX ADMIN — SENNA PLUS 2 TABLET(S): 8.6 TABLET ORAL at 22:04

## 2023-12-29 RX ADMIN — HYDROMORPHONE HYDROCHLORIDE 0.2 MILLIGRAM(S): 2 INJECTION INTRAMUSCULAR; INTRAVENOUS; SUBCUTANEOUS at 23:29

## 2023-12-29 RX ADMIN — HEPARIN SODIUM 5000 UNIT(S): 5000 INJECTION INTRAVENOUS; SUBCUTANEOUS at 19:22

## 2023-12-29 NOTE — CONSULT NOTE ADULT - SUBJECTIVE AND OBJECTIVE BOX
Date of Service, 12-29-23 @ 18:58  CHIEF COMPLAINT:Patient is a 88y old  Female who presents with a chief complaint of fall/ syncope (29 Dec 2023 16:36)      HPI:  88 year old female with hx of hypothyroidism, arthritis bilat hip replacements comes into the ED via EMS after unwitnessed fall this morning. Pt lives alone and states she woke up at 3am which is normal for her. Last thing she remembers is eating breakfast and then she remembers finding herself on the ground. She is not on any AC and does not remember having any dizziness, palpitations, chest pain or other symptoms prior to the fall. After waking up she reports having pain in the left lower extremity specifically in the left hip. She also reports she has been having a cough over the past few days. She denies any HA, neck pain, back pain, chest pain, change in vision, abd pain, n/v, urinary symptoms, sensory changes or motor weakness      PAST MEDICAL & SURGICAL HISTORY:  Hypothyroid  Arthritis  Stomach ulcer  Hernia, hiatal  Spondylitis  Former smoker, stopped smoking many years ago  Hypothyroidism, unspecified type  History of bilateral hip replacements  Status post left knee replacement    No significant past surgical history    MEDICATIONS  (STANDING):  aspirin enteric coated 81 milliGRAM(s) Oral daily  atorvastatin 40 milliGRAM(s) Oral at bedtime  heparin   Injectable 5000 Unit(s) SubCutaneous every 12 hours  levothyroxine 75 MICROGram(s) Oral daily  pantoprazole    Tablet 40 milliGRAM(s) Oral before breakfast  senna 2 Tablet(s) Oral at bedtime    MEDICATIONS  (PRN):  HYDROmorphone  Injectable 0.2 milliGRAM(s) IV Push every 8 hours PRN Severe Pain (7 - 10)  oxyCODONE    IR 5 milliGRAM(s) Oral every 8 hours PRN Moderate Pain (4 - 6)      FAMILY HISTORY:  No pertinent family history in first degree relatives      SOCIAL HISTORY:    [ x] Non-smoker  [ ] Smoker  [ ] Alcohol    Allergies    vancomycin (Blisters)    Intolerances    	    REVIEW OF SYSTEMS:  CONSTITUTIONAL: No fever, weight loss, or fatigue  EYES: No eye pain, visual disturbances, or discharge  ENT:  No difficulty hearing, tinnitus, vertigo; No sinus or throat pain  NECK: No pain or stiffness  RESPIRATORY: No cough, wheezing, chills or hemoptysis; + Shortness of Breath  CARDIOVASCULAR: No chest pain, palpitations, passing out, dizziness, or leg swelling  GASTROINTESTINAL: No abdominal or epigastric pain. No nausea, vomiting, or hematemesis; No diarrhea or constipation. No melena or hematochezia.  GENITOURINARY: No dysuria, frequency, hematuria, or incontinence  NEUROLOGICAL: No headaches, memory loss, loss of strength, numbness, or tremors  SKIN: No itching, burning, rashes, or lesions   LYMPH Nodes: No enlarged glands  ENDOCRINE: No heat or cold intolerance; No hair loss  MUSCULOSKELETAL: No joint pain or swelling; No muscle, back, or extremity pain  PSYCHIATRIC: No depression, anxiety, mood swings, or difficulty sleeping  HEME/LYMPH: No easy bruising, or bleeding gums  ALLERGY AND IMMUNOLOGIC: No hives or eczema	    [x ] All others negative	  [ ] Unable to obtain    PHYSICAL EXAM:  T(C): 36.6 (12-29-23 @ 14:25), Max: 36.6 (12-29-23 @ 14:25)  HR: 88 (12-29-23 @ 18:20) (53 - 88)  BP: 134/65 (12-29-23 @ 18:20) (103/3 - 140/80)  RR: 20 (12-29-23 @ 18:20) (18 - 20)  SpO2: 99% (12-29-23 @ 18:20) (95% - 99%)  Wt(kg): --  I&O's Summary      Appearance: Normal	  HEENT:   Normal oral mucosa, PERRL, EOMI	  Lymphatic: No lymphadenopathy  Cardiovascular: Normal S1 S2, No JVD, + murmurs, No edema  Respiratory: rhonchi  Gastrointestinal:  Soft, Non-tender, + BS	  Skin: No rashes, No ecchymoses, No cyanosis	  Neurologic: Non-focal  Extremities: Normal range of motion, No clubbing, cyanosis or edema  Vascular: Peripheral pulses palpable 2+ bilaterally    TELEMETRY: 	    ECG:  	  RADIOLOGY:  OTHER: 	  	  LABS:	 	    CARDIAC MARKERS:                              12.7   6.49  )-----------( 223      ( 29 Dec 2023 09:39 )             39.8     12-29    137  |  104  |  15  ----------------------------<  94  4.1   |  22  |  0.78    Ca    9.0      29 Dec 2023 09:39  Mg     2.5     12-29    TPro  7.9  /  Alb  3.7  /  TBili  0.6  /  DBili  x   /  AST  18  /  ALT  9<L>  /  AlkPhos  104  12-29    proBNP:   Lipid Profile:   HgA1c:   TSH:       PREVIOUS DIAGNOSTIC TESTING:      · EKG Date/Time: 29-Dec-2023 10:01  · Rate: 89  · IL: 154  · QRS: 80      < from: Transthoracic Echocardiogram (05.19.15 @ 14:48) >  Mitral Valve: Normal mitral valve.  Aortic Valve/Aorta: Normal trileaflet aortic valve.  Left Atrium: Left atrium not well visualized.  Left Ventricle: Preserved  left ventricular systolic  function based on Parasternal views. Limited evaluation.  Right Heart: The right heart  is not well visualized.  Pericardium/Pleura: Normal pericardium with no pericardial  effusion.  ------------------------------------------------------------------------  Conclusions:  1. Preserved  left ventricular systolic function based on  Parasternal views. Limited evaluation.  2. The right heart  is not well visualized.    < from: 12 Lead ECG (12.29.23 @ 08:21) >  Diagnosis Line NORMAL SINUS RHYTHM  POSSIBLE ANTERIOR INFARCT , AGE UNDETERMINED  ABNORMAL ECG  WHEN COMPARED WITH ECG OF 10-PRAFUL-2021 06:30,  NO SIGNIFICANT CHANGE WAS FOUND    < from: Nuclear Stress Test-Pharmacologic (Nuclear Stress Test-Pharmacologic .) (05.20.15 @ 13:19) >  quality.  * The left ventriclewas normal in size. There are mild  defects in thr inferior, inferolateral, and apical walls  that are fixed and correct with prone imaging suggestive  of attenuation artifacts.  * Post-stress gated wall motion analysis was performed  (LVEF = 69 %;LVEDV = 56 ml.), revealing normal LV  function.    < from: CT Head No Cont (12.29.23 @ 11:12) >    1. 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 No evidence of acute intracranial injury.    < from: CT Pelvis Bony Only No Cont (12.29.23 @ 11:11) >  1.  No fractures are seen.  2.  Long segment of mild to moderate wall thickening involves the upper   sigmoid colon with a soft tissue tract extending to the mid sigmoid   colon. These changes could reflect scarring fromprior inflammation and a   fistulous tract. Neoplasm cannot be absolutely excluded.  3.  Correlation with other prior imaging or prior colonoscopy is   suggested if available. Follow-up CT of the abdomen and pelvis with oral   and/or rectal contrast may be helpful to assess for a fistula. Consider   colonoscopy as warranted.    SARS-CoV-2 Result: NotDete: This Respiratory Panel uses polymerase chain reaction (PCR) to detect for  influenza A; influenza B; respiratory syncytial virus; and SARS-CoV-2.  This test was validated by [employer] and is in use under the FDA  Emergency Use Authorization (EUA) for clinical labs CLIA-certified to  perform high complexity testing. Test results should be correlated with  clinical presentation, patient history, and epidemiology. (12.29.23 @ 16:09)    Influenza A Result: Detected (12.29.23 @ 16:09)             Date of Service, 12-29-23 @ 18:58  CHIEF COMPLAINT:Patient is a 88y old  Female who presents with a chief complaint of fall/ syncope (29 Dec 2023 16:36)      HPI:  88 year old female with hx of hypothyroidism, arthritis bilat hip replacements comes into the ED via EMS after unwitnessed fall this morning. Pt lives alone and states she woke up at 3am which is normal for her. Last thing she remembers is eating breakfast and then she remembers finding herself on the ground. She is not on any AC and does not remember having any dizziness, palpitations, chest pain or other symptoms prior to the fall. After waking up she reports having pain in the left lower extremity specifically in the left hip. She also reports she has been having a cough over the past few days. She denies any HA, neck pain, back pain, chest pain, change in vision, abd pain, n/v, urinary symptoms, sensory changes or motor weakness      PAST MEDICAL & SURGICAL HISTORY:  Hypothyroid  Arthritis  Stomach ulcer  Hernia, hiatal  Spondylitis  Former smoker, stopped smoking many years ago  Hypothyroidism, unspecified type  History of bilateral hip replacements  Status post left knee replacement    No significant past surgical history    MEDICATIONS  (STANDING):  aspirin enteric coated 81 milliGRAM(s) Oral daily  atorvastatin 40 milliGRAM(s) Oral at bedtime  heparin   Injectable 5000 Unit(s) SubCutaneous every 12 hours  levothyroxine 75 MICROGram(s) Oral daily  pantoprazole    Tablet 40 milliGRAM(s) Oral before breakfast  senna 2 Tablet(s) Oral at bedtime    MEDICATIONS  (PRN):  HYDROmorphone  Injectable 0.2 milliGRAM(s) IV Push every 8 hours PRN Severe Pain (7 - 10)  oxyCODONE    IR 5 milliGRAM(s) Oral every 8 hours PRN Moderate Pain (4 - 6)      FAMILY HISTORY:  No pertinent family history in first degree relatives      SOCIAL HISTORY:    [ x] Non-smoker  [ ] Smoker  [ ] Alcohol    Allergies    vancomycin (Blisters)    Intolerances    	    REVIEW OF SYSTEMS:  CONSTITUTIONAL: No fever, weight loss, or fatigue  EYES: No eye pain, visual disturbances, or discharge  ENT:  No difficulty hearing, tinnitus, vertigo; No sinus or throat pain  NECK: No pain or stiffness  RESPIRATORY: No cough, wheezing, chills or hemoptysis; + Shortness of Breath  CARDIOVASCULAR: No chest pain, palpitations, passing out, dizziness, or leg swelling  GASTROINTESTINAL: No abdominal or epigastric pain. No nausea, vomiting, or hematemesis; No diarrhea or constipation. No melena or hematochezia.  GENITOURINARY: No dysuria, frequency, hematuria, or incontinence  NEUROLOGICAL: No headaches, memory loss, loss of strength, numbness, or tremors  SKIN: No itching, burning, rashes, or lesions   LYMPH Nodes: No enlarged glands  ENDOCRINE: No heat or cold intolerance; No hair loss  MUSCULOSKELETAL: No joint pain or swelling; No muscle, back, or extremity pain  PSYCHIATRIC: No depression, anxiety, mood swings, or difficulty sleeping  HEME/LYMPH: No easy bruising, or bleeding gums  ALLERGY AND IMMUNOLOGIC: No hives or eczema	    [x ] All others negative	  [ ] Unable to obtain    PHYSICAL EXAM:  T(C): 36.6 (12-29-23 @ 14:25), Max: 36.6 (12-29-23 @ 14:25)  HR: 88 (12-29-23 @ 18:20) (53 - 88)  BP: 134/65 (12-29-23 @ 18:20) (103/3 - 140/80)  RR: 20 (12-29-23 @ 18:20) (18 - 20)  SpO2: 99% (12-29-23 @ 18:20) (95% - 99%)  Wt(kg): --  I&O's Summary      Appearance: Normal	  HEENT:   Normal oral mucosa, PERRL, EOMI	  Lymphatic: No lymphadenopathy  Cardiovascular: Normal S1 S2, No JVD, + murmurs, No edema  Respiratory: rhonchi  Gastrointestinal:  Soft, Non-tender, + BS	  Skin: No rashes, No ecchymoses, No cyanosis	  Neurologic: Non-focal  Extremities: Normal range of motion, No clubbing, cyanosis or edema  Vascular: Peripheral pulses palpable 2+ bilaterally    TELEMETRY: 	    ECG:  	  RADIOLOGY:  OTHER: 	  	  LABS:	 	    CARDIAC MARKERS:                              12.7   6.49  )-----------( 223      ( 29 Dec 2023 09:39 )             39.8     12-29    137  |  104  |  15  ----------------------------<  94  4.1   |  22  |  0.78    Ca    9.0      29 Dec 2023 09:39  Mg     2.5     12-29    TPro  7.9  /  Alb  3.7  /  TBili  0.6  /  DBili  x   /  AST  18  /  ALT  9<L>  /  AlkPhos  104  12-29    proBNP:   Lipid Profile:   HgA1c:   TSH:       PREVIOUS DIAGNOSTIC TESTING:      · EKG Date/Time: 29-Dec-2023 10:01  · Rate: 89  · WI: 154  · QRS: 80      < from: Transthoracic Echocardiogram (05.19.15 @ 14:48) >  Mitral Valve: Normal mitral valve.  Aortic Valve/Aorta: Normal trileaflet aortic valve.  Left Atrium: Left atrium not well visualized.  Left Ventricle: Preserved  left ventricular systolic  function based on Parasternal views. Limited evaluation.  Right Heart: The right heart  is not well visualized.  Pericardium/Pleura: Normal pericardium with no pericardial  effusion.  ------------------------------------------------------------------------  Conclusions:  1. Preserved  left ventricular systolic function based on  Parasternal views. Limited evaluation.  2. The right heart  is not well visualized.    < from: 12 Lead ECG (12.29.23 @ 08:21) >  Diagnosis Line NORMAL SINUS RHYTHM  POSSIBLE ANTERIOR INFARCT , AGE UNDETERMINED  ABNORMAL ECG  WHEN COMPARED WITH ECG OF 10-PRAFUL-2021 06:30,  NO SIGNIFICANT CHANGE WAS FOUND    < from: Nuclear Stress Test-Pharmacologic (Nuclear Stress Test-Pharmacologic .) (05.20.15 @ 13:19) >  quality.  * The left ventriclewas normal in size. There are mild  defects in thr inferior, inferolateral, and apical walls  that are fixed and correct with prone imaging suggestive  of attenuation artifacts.  * Post-stress gated wall motion analysis was performed  (LVEF = 69 %;LVEDV = 56 ml.), revealing normal LV  function.    < from: CT Head No Cont (12.29.23 @ 11:12) >    1. 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 No evidence of acute intracranial injury.    < from: CT Pelvis Bony Only No Cont (12.29.23 @ 11:11) >  1.  No fractures are seen.  2.  Long segment of mild to moderate wall thickening involves the upper   sigmoid colon with a soft tissue tract extending to the mid sigmoid   colon. These changes could reflect scarring fromprior inflammation and a   fistulous tract. Neoplasm cannot be absolutely excluded.  3.  Correlation with other prior imaging or prior colonoscopy is   suggested if available. Follow-up CT of the abdomen and pelvis with oral   and/or rectal contrast may be helpful to assess for a fistula. Consider   colonoscopy as warranted.    SARS-CoV-2 Result: NotDete: This Respiratory Panel uses polymerase chain reaction (PCR) to detect for  influenza A; influenza B; respiratory syncytial virus; and SARS-CoV-2.  This test was validated by [employer] and is in use under the FDA  Emergency Use Authorization (EUA) for clinical labs CLIA-certified to  perform high complexity testing. Test results should be correlated with  clinical presentation, patient history, and epidemiology. (12.29.23 @ 16:09)    Influenza A Result: Detected (12.29.23 @ 16:09)

## 2023-12-29 NOTE — ED PROVIDER NOTE - HIV OFFER
Consent: The patient's consent was obtained including but not limited to risks of crusting, scabbing, blistering, scarring, darker or lighter pigmentary change, recurrence, incomplete removal and infection. Detail Level: Detailed Show Applicator Variable?: Yes Duration Of Freeze Thaw-Cycle (Seconds): 0 Render Note In Bullet Format When Appropriate: No Post-Care Instructions: I reviewed with the patient in detail post-care instructions. Patient is to wear sunprotection, and avoid picking at any of the treated lesions. Pt may apply Vaseline to crusted or scabbing areas. Opt out

## 2023-12-29 NOTE — ED PROVIDER NOTE - PROGRESS NOTE DETAILS
Santino Cary PGY2:  reevaluated Pt. Her pain is still very severe. Will give additional pain meds. Pt thinks she has been here for days. Appears slightly confused. This may be secondary to her being in pain. Will try to ambulate, still pending UA. Santino Cary PGY2:  reevaluated pt. Still in significant pain to the left hip. Unable to bear weight. Pt is not safe to go home as she lives alone. She is agreeable with plan to be admitted to be seen by PT and OT. Santino Cary PGY2:  reevaluated pt. Still in significant pain to the left hip. Unable to bear weight. Pt is not safe to go home as she lives alone. She is agreeable with plan to be admitted to be seen by PT and OT. Discussed CT scan findings with pt as well as reviewed labs. She states she does not have any family that she wants us to inform. She has a neice but she is not very close with them.

## 2023-12-29 NOTE — H&P ADULT - NSHPREVIEWOFSYSTEMS_GEN_ALL_CORE
bREVIEW OF SYSTEMS:  CONSTITUTIONAL: No fever,  no  weight loss  ENT:  No  tinnitus,   no   vertigo  NECK: No pain or stiffness  RESPIRATORY: No cough, wheezing, chills or hemoptysis;    No Shortness of Breath  CARDIOVASCULAR: No chest pain, palpitations, dizziness  GASTROINTESTINAL: No abdominal or epigastric pain. No nausea, vomiting, or hematemesis; No diarrhea  No melena or hematochezia.  GENITOURINARY: No dysuria, frequency, hematuria, or incontinence  NEUROLOGICAL: No headaches  SKIN: No itching,  no   rash  LYMPH Nodes: No enlarged glands  ENDOCRINE: No heat or cold intolerance  MUSCULOSKELETAL: No joint pain or swelling  PSYCHIATRIC: No depression, anxiety  HEME/LYMPH: No easy bruising, or bleeding gums  ALLERGY AND IMMUNOLOGIC: No hives or eczema

## 2023-12-29 NOTE — H&P ADULT - NSHPPHYSICALEXAM_GEN_ALL_CORE
T(C): 36.5 (12-29-23 @ 08:34), Max: 36.5 (12-29-23 @ 08:34)  HR: 82 (12-29-23 @ 12:45) (53 - 85)  BP: 119/55 (12-29-23 @ 12:45) (103/3 - 140/80)  RR: 20 (12-29-23 @ 12:45) (18 - 20)  SpO2: 99% (12-29-23 @ 12:45) (95% - 99%)  GENERAL: NAD, lying in bed comfortably  HEAD:  Atraumatic, normocephalic  EYES: EOMI, PERRLA, conjunctiva and sclera clear  NECK: Supple, trachea midline, no JVD  HEART: Regular rate and rhythm, no murmurs, rubs, or gallops  LUNGS: Unlabored respirations.  Clear to auscultation bilaterally, no crackles, wheezing, or rhonchi  ABDOMEN: Soft, nontender, nondistended, +BS  EXTREMITIES: 2+ peripheral pulses bilaterally. No clubbing, cyanosis, or edema  NERVOUS SYSTEM:  A&Ox3, moving all extremities, no focal deficits /  gait  not tested/  pt  in  er  SKIN: No rashes or lesions

## 2023-12-29 NOTE — ED PROVIDER NOTE - CLINICAL SUMMARY MEDICAL DECISION MAKING FREE TEXT BOX
88 year old female with hx of hypothyroidism, arthritis bilat hip replacements comes into the ED via EMS after unwitnessed fall this morning. Pt lives alone and states she woke up at 3am which is normal for her. Last thing she remembers is eating breakfast and then she remembers finding herself on the ground. She is not on any AC and does not remember having any dizziness, palpitations, chest pain or other symptoms prior to the fall. On exam, Pt is a&ox4, normal neuro exam, has a cough but lungs sound clear, pain with ROM of the left hip and palpation of the left hip but no obvious deformity noted. Will order syncope work up, ECG does not look concerning, chest xray, pelvis, L hip, femur, knee. DDx includes but not limited to electrolyte abnormalities, viral/bacterial pneumonia, UTI, arrythmia leading to fall, possible left hip fracture. Dispo pending work up and reeval. 88 year old female with hx of hypothyroidism, arthritis bilat hip replacements comes into the ED via EMS after unwitnessed fall this morning. Pt lives alone and states she woke up at 3am which is normal for her. Last thing she remembers is eating breakfast and then she remembers finding herself on the ground. She is not on any AC and does not remember having any dizziness, palpitations, chest pain or other symptoms prior to the fall. On exam, Pt is a&ox4, normal neuro exam, has a cough but lungs sound clear, pain with ROM of the left hip and palpation of the left hip but no obvious deformity noted. Will order syncope work up, ECG does not look concerning, chest xray, pelvis, L hip, femur, knee. DDx includes but not limited to electrolyte abnormalities, viral/bacterial pneumonia, UTI, arrythmia leading to fall, possible left hip fracture. Dispo pending work up and reeval.    Attending MD Krueger.  Agree with above.  Pt is an 89 yo fem from home s/p unwitnessed [facility] trip/fall.  No LOC/AC.  Vitals non-actionable.  LLE pain lead to call.  Pt has not been able to ambulate since fall.  Pain persistent on arrival without overt shortening/rotation of LLE.  Neurovascularly intact distal to L hip.  XR non-diagnostic.  CT ordered 2/ concern for L hip hardware assoc occult fx 2/2 pain/mechanism and non-dxic XR. 88 year old female with hx of hypothyroidism, arthritis bilat hip replacements comes into the ED via EMS after unwitnessed fall this morning. Pt lives alone and states she woke up at 3am which is normal for her. Last thing she remembers is eating breakfast and then she remembers finding herself on the ground. She is not on any AC and does not remember having any dizziness, palpitations, chest pain or other symptoms prior to the fall. On exam, Pt is a&ox4, normal neuro exam, has a cough but lungs sound clear, pain with ROM of the left hip and palpation of the left hip but no obvious deformity noted. Will order syncope work up, ECG does not look concerning, chest xray, pelvis, L hip, femur, knee. DDx includes but not limited to electrolyte abnormalities, viral/bacterial pneumonia, UTI, arrythmia leading to fall, possible left hip fracture. Dispo pending work up and reeval.    Attending MD Krueger.  Agree with above.  Pt is an 89 yo fem from home s/p unwitnessed [facility] trip/fall.  No LOC/AC.  Vitals non-actionable.  LLE pain lead to call.  Pt has not been able to ambulate since fall.  Pain persistent on arrival without overt shortening/rotation of LLE.  Neurovascularly intact distal to L hip.  XR non-diagnostic.  CT ordered 2/ concern for L hip hardware assoc occult fx 2/2 pain/mechanism and non-dxic XR.

## 2023-12-29 NOTE — H&P ADULT - NSHPLABSRESULTS_GEN_ALL_CORE
LABS:                        12.7   6.49  )-----------( 223      ( 29 Dec 2023 09:39 )             39.8     12-29    137  |  104  |  15  ----------------------------<  94  4.1   |  22  |  0.78    Ca    9.0      29 Dec 2023 09:39  Mg     2.5     12-29    TPro  7.9  /  Alb  3.7  /  TBili  0.6  /  DBili  x   /  AST  18  /  ALT  9<L>  /  AlkPhos  104  12-29          Urinalysis Basic - ( 29 Dec 2023 09:39 )    Color: x / Appearance: x / SG: x / pH: x  Gluc: 94 mg/dL / Ketone: x  / Bili: x / Urobili: x   Blood: x / Protein: x / Nitrite: x   Leuk Esterase: x / RBC: x / WBC x   Sq Epi: x / Non Sq Epi: x / Bacteria: x

## 2023-12-29 NOTE — H&P ADULT - ASSESSMENT
88 year old female      h/o  hypothyroidism, arthritis bilat hip replacements /  olecranon fx/ prior h/o  dizziness      arrives  via EMS after unwitnessed fall this morning.       pt  lives alone /  woke up at 3am which is normal for her. lasr   remembers is eating breakfast and then she remembers finding herself on the ground.     denies  dizziness, palpitations, chest pain/sob/ abd pain       upon waking up she reports having pain in the left lower extremity ./   has  difficulty  in ambulation since  syncopal event/  xrays  in er,  no fx      syncope  / collapse /  cause   unknown      tele    check orthostatics   echo    card  eval   Ct   head,  left b/ ganglia infarct/ not reported  a s acute      on asa/  lipitor   Hypothyroid     h/o b/l hip surg, in the past    xrays, no fx     CT  pelvis,  thickened   sigmoid  colon/ with ?  fistula/ ?  mass        house  gi eval  calked    on dvt ppx     fall risk    echo         ad< from: CT 3D Reconstruct w/ Workstation (12.29.23 @ 11:11) >  IMPRESSION:  1.  No fractures are seen.  2.  Long segment of mild to moderate wall thickening involves the upper   sigmoid colon with a soft tissue tract extending to the mid sigmoid   colon. These changes could reflect scarring fromprior inflammation and a   fistulous tract. Neoplasm cannot be absolutely excluded.  3.  Correlation with other prior imaging or prior colonoscopy is   suggested if available. Follow-up CT of the abdomen and pelvis with oral   and/or rectal contrast may be helpful to assess for a fistula. Consider   colonoscopy as warranted.  --- End of Report ---      rad< from: CT Head No Cont (12.29.23 @ 11:12) >  IMPRESSION:  1. 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 No evidence of acute intracranial injury.  --- End of Report ---            <  88 year old female      h/o  hypothyroidism, arthritis bilat hip replacements /  olecranon fx/ prior h/o  dizziness      arrives  via EMS after unwitnessed fall this morning.       pt  lives alone /  woke up at 3am which is normal for her. lasr   remembers is eating breakfast and then she remembers finding herself on the ground.     denies  dizziness, palpitations, chest pain/sob/ abd pain       upon waking up she reports having pain in the left lower extremity ./   has  difficulty  in ambulation since  syncopal event/  xrays  in er,  no fx      syncope  / collapse /  cause   unknown      tele    check orthostatics   echo   Raunaud;s disease/ fingers    card  eval   Ct   head,  left b/ ganglia infarct/ not reported  a s acute      on asa/  lipitor   Hypothyroid     h/o b/l hip surg, in the past    xrays, no fx/  has  rom  of  b/l legs/ ha s  difficulty on ambulation   has no abd pain     CT  pelvis,  thickened   sigmoid  colon/ with ?  fistula/ ?  mass        house  gi eval  calked    on dvt ppx     fall risk    echo     lives  alone. qiu s no childeen/  and is  full  code         ad< from: CT 3D Reconstruct w/ Workstation (12.29.23 @ 11:11) >  IMPRESSION:  1.  No fractures are seen.  2.  Long segment of mild to moderate wall thickening involves the upper   sigmoid colon with a soft tissue tract extending to the mid sigmoid   colon. These changes could reflect scarring fromprior inflammation and a   fistulous tract. Neoplasm cannot be absolutely excluded.  3.  Correlation with other prior imaging or prior colonoscopy is   suggested if available. Follow-up CT of the abdomen and pelvis with oral   and/or rectal contrast may be helpful to assess for a fistula. Consider   colonoscopy as warranted.  --- End of Report ---      rad< from: CT Head No Cont (12.29.23 @ 11:12) >  IMPRESSION:  1. 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 No evidence of acute intracranial injury.  --- End of Report ---            <

## 2023-12-29 NOTE — ED ADULT TRIAGE NOTE - CHIEF COMPLAINT QUOTE
Unwitnessed mechanical trip and fall, no LOC, no head trauma, R hip  pain/ unable to bear weight with EMS

## 2023-12-29 NOTE — CONSULT NOTE ADULT - ASSESSMENT
88 year old female with hx of hypothyroidism, arthritis bilat hip replacements comes into the ED via EMS after unwitnessed fall this morning. Pt lives alone and states she woke up at 3am which is normal for her. Last thing she remembers is eating breakfast and then she remembers finding herself on the ground. She is not on any AC and does not remember having any dizziness, palpitations, chest pain or other symptoms prior to the fall. After waking up she reports having pain in the left lower extremity specifically in the left hip. She also reports she has been having a cough over the past few days. She denies any HA, neck pain, back pain, chest pain, change in vision, abd pain, n/v, urinary symptoms, sensory changes or motor weakness  syncope ?sec to dehydration/ influenza  iv hydration  ct scan noted ?colitis ?ischemia gi has been called  hypothyroid/  tsh  asa daily  TTE  check orthostatic

## 2023-12-29 NOTE — ED PROVIDER NOTE - OBJECTIVE STATEMENT
88 year old female with hx of hypothyroidism, arthritis bilat hip replacements comes into the ED via EMS after unwitnessed fall this morning. Pt lives alone and states she woke up at 3am which is normal for her. Last thing she remembers is eating breakfast and then she remembers finding herself on the ground. She is not on any AC and does not remember having any dizziness, palpitations, chest pain or other symptoms prior to the fall. After waking up she reports having pain in the left lower extremity specifically in the left hip. She also reports she has been having a cough over the past few days. She denies any HA, neck pain, back pain, chest pain, change in vision, abd pain, n/v, urinary symptoms, sensory changes or motor weakness.

## 2023-12-29 NOTE — ED PROVIDER NOTE - PHYSICAL EXAMINATION
GENERAL: Not in acute distress, non-toxic appearing  HEAD: normocephalic, atraumatic  HEENT: PERRLA, EOMI, normal conjunctiva, oral mucosa moist, neck supple  CARDIAC: regular rate and rhythm, normal S1 and S2,  no appreciable murmurs  PULM: clear to ascultation bilaterally, no crackles, rales, rhonchi, or wheezing  GI: abdomen nondistended, soft, nontender, no guarding or rebound tenderness  NEURO: alert and oriented x 3, normal speech, no focal motor or sensory deficits,  no gross neurologic deficit  MSK: + pain with palpation of the left hip and with ROM of the left hip, LLE not shortened or rotated, No visible deformities, no peripheral edema, calf tenderness/redness/swelling  SKIN: No visible rashes, dry, well-perfused  PSYCH: appropriate mood and affect

## 2023-12-29 NOTE — ED PROVIDER NOTE - ATTENDING CONTRIBUTION TO CARE
Attending MD Krueger:  I performed a history and physical exam of the patient and discussed their management with the resident. I reviewed the resident's note and agree with the documented findings and plan of care. My medical decision making and observations are found above.

## 2023-12-29 NOTE — ED ADULT NURSE NOTE - OBJECTIVE STATEMENT
89 y/o F A&Ox3 PMH hypothyroid, spondylitis, hernia, arthritis PSH left knee replacement, bilateral hip replacements presents to the ED via EMS c/o hip pain. Pt reports "tripping over something" and falling this morning. Pt states she did not hit her head, no LOC, no anticoagulation. Upon ED arrival pt is well appearing. Breathing is even and unlabored. Skin is warm, dry & in tact. Pt reports left sided hip and leg pain. On assessment no obvious signs of injury or gross deformity ntoed. Pt denies CP, SOB, dizziness, N/V/D, numbness, tingling. IV access obtained. Call bell within reach, comfort & safety provided. 87 y/o F A&Ox3 PMH hypothyroid, spondylitis, hernia, arthritis PSH left knee replacement, bilateral hip replacements presents to the ED via EMS c/o hip pain. Pt reports "tripping over something" and falling this morning. Pt states she did not hit her head, no LOC, no anticoagulation. Upon ED arrival pt is well appearing. Breathing is even and unlabored. Skin is warm, dry & in tact. Pt reports left sided hip and leg pain. On assessment no obvious signs of injury or gross deformity ntoed. Pt denies CP, SOB, dizziness, N/V/D, numbness, tingling. IV access obtained. Call bell within reach, comfort & safety provided.

## 2023-12-30 LAB
ANION GAP SERPL CALC-SCNC: 14 MMOL/L — SIGNIFICANT CHANGE UP (ref 5–17)
ANION GAP SERPL CALC-SCNC: 14 MMOL/L — SIGNIFICANT CHANGE UP (ref 5–17)
APPEARANCE UR: ABNORMAL
APPEARANCE UR: ABNORMAL
BACTERIA # UR AUTO: ABNORMAL /HPF
BACTERIA # UR AUTO: ABNORMAL /HPF
BILIRUB UR-MCNC: NEGATIVE — SIGNIFICANT CHANGE UP
BILIRUB UR-MCNC: NEGATIVE — SIGNIFICANT CHANGE UP
BUN SERPL-MCNC: 15 MG/DL — SIGNIFICANT CHANGE UP (ref 7–23)
BUN SERPL-MCNC: 15 MG/DL — SIGNIFICANT CHANGE UP (ref 7–23)
CALCIUM SERPL-MCNC: 8.5 MG/DL — SIGNIFICANT CHANGE UP (ref 8.4–10.5)
CALCIUM SERPL-MCNC: 8.5 MG/DL — SIGNIFICANT CHANGE UP (ref 8.4–10.5)
CAST: 6 /LPF — HIGH (ref 0–4)
CAST: 6 /LPF — HIGH (ref 0–4)
CHLORIDE SERPL-SCNC: 102 MMOL/L — SIGNIFICANT CHANGE UP (ref 96–108)
CHLORIDE SERPL-SCNC: 102 MMOL/L — SIGNIFICANT CHANGE UP (ref 96–108)
CO2 SERPL-SCNC: 20 MMOL/L — LOW (ref 22–31)
CO2 SERPL-SCNC: 20 MMOL/L — LOW (ref 22–31)
COLOR SPEC: SIGNIFICANT CHANGE UP
COLOR SPEC: SIGNIFICANT CHANGE UP
CREAT SERPL-MCNC: 0.68 MG/DL — SIGNIFICANT CHANGE UP (ref 0.5–1.3)
CREAT SERPL-MCNC: 0.68 MG/DL — SIGNIFICANT CHANGE UP (ref 0.5–1.3)
DIFF PNL FLD: NEGATIVE — SIGNIFICANT CHANGE UP
DIFF PNL FLD: NEGATIVE — SIGNIFICANT CHANGE UP
EGFR: 84 ML/MIN/1.73M2 — SIGNIFICANT CHANGE UP
EGFR: 84 ML/MIN/1.73M2 — SIGNIFICANT CHANGE UP
GLUCOSE SERPL-MCNC: 114 MG/DL — HIGH (ref 70–99)
GLUCOSE SERPL-MCNC: 114 MG/DL — HIGH (ref 70–99)
GLUCOSE UR QL: NEGATIVE MG/DL — SIGNIFICANT CHANGE UP
GLUCOSE UR QL: NEGATIVE MG/DL — SIGNIFICANT CHANGE UP
HCT VFR BLD CALC: 39.4 % — SIGNIFICANT CHANGE UP (ref 34.5–45)
HCT VFR BLD CALC: 39.4 % — SIGNIFICANT CHANGE UP (ref 34.5–45)
HGB BLD-MCNC: 12.4 G/DL — SIGNIFICANT CHANGE UP (ref 11.5–15.5)
HGB BLD-MCNC: 12.4 G/DL — SIGNIFICANT CHANGE UP (ref 11.5–15.5)
KETONES UR-MCNC: 40 MG/DL
KETONES UR-MCNC: 40 MG/DL
LEUKOCYTE ESTERASE UR-ACNC: NEGATIVE — SIGNIFICANT CHANGE UP
LEUKOCYTE ESTERASE UR-ACNC: NEGATIVE — SIGNIFICANT CHANGE UP
MCHC RBC-ENTMCNC: 26.7 PG — LOW (ref 27–34)
MCHC RBC-ENTMCNC: 26.7 PG — LOW (ref 27–34)
MCHC RBC-ENTMCNC: 31.5 GM/DL — LOW (ref 32–36)
MCHC RBC-ENTMCNC: 31.5 GM/DL — LOW (ref 32–36)
MCV RBC AUTO: 84.9 FL — SIGNIFICANT CHANGE UP (ref 80–100)
MCV RBC AUTO: 84.9 FL — SIGNIFICANT CHANGE UP (ref 80–100)
NITRITE UR-MCNC: NEGATIVE — SIGNIFICANT CHANGE UP
NITRITE UR-MCNC: NEGATIVE — SIGNIFICANT CHANGE UP
NRBC # BLD: 0 /100 WBCS — SIGNIFICANT CHANGE UP (ref 0–0)
NRBC # BLD: 0 /100 WBCS — SIGNIFICANT CHANGE UP (ref 0–0)
PH UR: 6 — SIGNIFICANT CHANGE UP (ref 5–8)
PH UR: 6 — SIGNIFICANT CHANGE UP (ref 5–8)
PLATELET # BLD AUTO: 221 K/UL — SIGNIFICANT CHANGE UP (ref 150–400)
PLATELET # BLD AUTO: 221 K/UL — SIGNIFICANT CHANGE UP (ref 150–400)
POTASSIUM SERPL-MCNC: 3.8 MMOL/L — SIGNIFICANT CHANGE UP (ref 3.5–5.3)
POTASSIUM SERPL-MCNC: 3.8 MMOL/L — SIGNIFICANT CHANGE UP (ref 3.5–5.3)
POTASSIUM SERPL-SCNC: 3.8 MMOL/L — SIGNIFICANT CHANGE UP (ref 3.5–5.3)
POTASSIUM SERPL-SCNC: 3.8 MMOL/L — SIGNIFICANT CHANGE UP (ref 3.5–5.3)
PROT UR-MCNC: 30 MG/DL
PROT UR-MCNC: 30 MG/DL
RBC # BLD: 4.64 M/UL — SIGNIFICANT CHANGE UP (ref 3.8–5.2)
RBC # BLD: 4.64 M/UL — SIGNIFICANT CHANGE UP (ref 3.8–5.2)
RBC # FLD: 13.8 % — SIGNIFICANT CHANGE UP (ref 10.3–14.5)
RBC # FLD: 13.8 % — SIGNIFICANT CHANGE UP (ref 10.3–14.5)
RBC CASTS # UR COMP ASSIST: 16 /HPF — HIGH (ref 0–4)
RBC CASTS # UR COMP ASSIST: 16 /HPF — HIGH (ref 0–4)
REVIEW: SIGNIFICANT CHANGE UP
REVIEW: SIGNIFICANT CHANGE UP
SODIUM SERPL-SCNC: 136 MMOL/L — SIGNIFICANT CHANGE UP (ref 135–145)
SODIUM SERPL-SCNC: 136 MMOL/L — SIGNIFICANT CHANGE UP (ref 135–145)
SP GR SPEC: >1.03 — HIGH (ref 1–1.03)
SP GR SPEC: >1.03 — HIGH (ref 1–1.03)
SQUAMOUS # UR AUTO: 8 /HPF — HIGH (ref 0–5)
SQUAMOUS # UR AUTO: 8 /HPF — HIGH (ref 0–5)
UROBILINOGEN FLD QL: 1 MG/DL — SIGNIFICANT CHANGE UP (ref 0.2–1)
UROBILINOGEN FLD QL: 1 MG/DL — SIGNIFICANT CHANGE UP (ref 0.2–1)
WBC # BLD: 4.81 K/UL — SIGNIFICANT CHANGE UP (ref 3.8–10.5)
WBC # BLD: 4.81 K/UL — SIGNIFICANT CHANGE UP (ref 3.8–10.5)
WBC # FLD AUTO: 4.81 K/UL — SIGNIFICANT CHANGE UP (ref 3.8–10.5)
WBC # FLD AUTO: 4.81 K/UL — SIGNIFICANT CHANGE UP (ref 3.8–10.5)
WBC UR QL: 8 /HPF — HIGH (ref 0–5)
WBC UR QL: 8 /HPF — HIGH (ref 0–5)

## 2023-12-30 PROCEDURE — 99222 1ST HOSP IP/OBS MODERATE 55: CPT | Mod: GC

## 2023-12-30 RX ADMIN — Medication 81 MILLIGRAM(S): at 17:39

## 2023-12-30 RX ADMIN — SENNA PLUS 2 TABLET(S): 8.6 TABLET ORAL at 21:07

## 2023-12-30 RX ADMIN — Medication 30 MILLIGRAM(S): at 17:39

## 2023-12-30 RX ADMIN — Medication 75 MICROGRAM(S): at 06:00

## 2023-12-30 RX ADMIN — Medication 75 MILLIGRAM(S): at 09:33

## 2023-12-30 RX ADMIN — PANTOPRAZOLE SODIUM 40 MILLIGRAM(S): 20 TABLET, DELAYED RELEASE ORAL at 06:00

## 2023-12-30 RX ADMIN — ATORVASTATIN CALCIUM 40 MILLIGRAM(S): 80 TABLET, FILM COATED ORAL at 21:07

## 2023-12-30 RX ADMIN — HEPARIN SODIUM 5000 UNIT(S): 5000 INJECTION INTRAVENOUS; SUBCUTANEOUS at 17:39

## 2023-12-30 NOTE — CONSULT NOTE ADULT - ATTENDING COMMENTS
88F, admtted w syncope, GI consulted for abnl imaging   CT showing ?sigmoid colon thickening w possible fistula   Will review imaging with radiology  Consider colonoscopy once she completes syncope w/u and is medically optimized if this is in line with her goals of care.   GI to follow, please call w questions

## 2023-12-30 NOTE — PHYSICAL THERAPY INITIAL EVALUATION ADULT - GAIT DEVIATIONS NOTED, PT EVAL
decreased elder/increased time in double stance/decreased step length/decreased weight-shifting ability

## 2023-12-30 NOTE — CONSULT NOTE ADULT - ASSESSMENT
88 year old female with hx of hypothyroidism, arthritis bilat hip replacements comes into the ED via EMS after unwitnessed fall/ syncope. GI consulted for thickening in sigmoid with ?fistula.    #Sigmoid thickening (incidentally found)  Seen on CT with ?fistula, ? neoplasm  This could be ischemic colitis iso syncopal episode of BP dropped. However, it would not explain the ?fistula (if there is actually one there). Will need to review imaging with radiology.    Recommendations:  - Would send GI PCR/cdiff if having diarrhea   - monitor stool for blood   - can potentially plan for colonoscopy on Tuesday if within patients GOC     Jessica Kimball MD  Gastroenterology/Hepatology Fellow, PGY-4  Please contact via TEAMS    NON-URGENT CONSULTS:  Please email [email or web address] OR  [email or web address]  AT NIGHT AND ON WEEKENDS:  Contact on-call GI fellow via answering service ([phone]) from 5pm-8am and on weekends/holidays  MONDAY-FRIDAY 8AM-5PM:  Pager# [phone]        88 year old female with hx of hypothyroidism, arthritis bilat hip replacements comes into the ED via EMS after unwitnessed fall/ syncope. GI consulted for thickening in sigmoid with ?fistula.    #Sigmoid thickening (incidentally found)  Seen on CT with ?fistula, ? neoplasm  This could be ischemic colitis iso syncopal episode of BP dropped. However, it would not explain the ?fistula (if there is actually one there). Will need to review imaging with radiology.    Recommendations:  - Would send GI PCR/cdiff if having diarrhea   - monitor stool for blood   - can potentially plan for colonoscopy on Tuesday if within patients GOC     Jessica Kimball MD  Gastroenterology/Hepatology Fellow, PGY-4  Please contact via TEAMS    NON-URGENT CONSULTS:  Please email [email or web address]  AT NIGHT AND ON WEEKENDS:  Contact on-call GI fellow via answering service ([phone]) from 5pm-8am and on weekends/holidays  MONDAY-FRIDAY 8AM-5PM:  Pager# [phone]

## 2023-12-30 NOTE — PHYSICAL THERAPY INITIAL EVALUATION ADULT - TRANSFER TRAINING, PT EVAL
GOAL: Patient will perform sit to stand transfers min A at rolling walker with proper hand placement in 1-2 weeks.

## 2023-12-30 NOTE — PROGRESS NOTE ADULT - SUBJECTIVE AND OBJECTIVE BOX
date of service: 12-30-23 @ 06:45  afberile  REVIEW OF SYSTEMS:  CONSTITUTIONAL: No fever,  no  weight loss  ENT:  No  tinnitus,   no   vertigo  NECK: No pain or stiffness  RESPIRATORY: No cough, wheezing, chills or hemoptysis;    No Shortness of Breath  CARDIOVASCULAR: No chest pain, palpitations, dizziness  GASTROINTESTINAL: No abdominal or epigastric pain. No nausea, vomiting, or hematemesis; No diarrhea  No melena or hematochezia.  GENITOURINARY: No dysuria, frequency, hematuria, or incontinence  NEUROLOGICAL: No headaches  SKIN: No itching,  no   rash  LYMPH Nodes: No enlarged glands  ENDOCRINE: No heat or cold intolerance  MUSCULOSKELETAL: No joint pain or swelling  PSYCHIATRIC: No depression, anxiety  HEME/LYMPH: No easy bruising, or bleeding gums  ALLERGY AND IMMUNOLOGIC: No hives or eczema	    MEDICATIONS  (STANDING):  aspirin enteric coated 81 milliGRAM(s) Oral daily  atorvastatin 40 milliGRAM(s) Oral at bedtime  heparin   Injectable 5000 Unit(s) SubCutaneous every 12 hours  levothyroxine 75 MICROGram(s) Oral daily  oseltamivir 75 milliGRAM(s) Oral two times a day  pantoprazole    Tablet 40 milliGRAM(s) Oral before breakfast  senna 2 Tablet(s) Oral at bedtime    MEDICATIONS  (PRN):  HYDROmorphone  Injectable 0.2 milliGRAM(s) IV Push every 8 hours PRN Severe Pain (7 - 10)  oxyCODONE    IR 5 milliGRAM(s) Oral every 8 hours PRN Moderate Pain (4 - 6)      Vital Signs Last 24 Hrs  T(C): 36.7 (30 Dec 2023 06:21), Max: 36.9 (29 Dec 2023 19:15)  T(F): 98 (30 Dec 2023 06:21), Max: 98.5 (29 Dec 2023 19:15)  HR: 97 (30 Dec 2023 06:21) (53 - 97)  BP: 137/76 (30 Dec 2023 06:21) (103/3 - 147/69)  BP(mean): 100 (29 Dec 2023 08:34) (100 - 100)  RR: 20 (30 Dec 2023 06:21) (18 - 20)  SpO2: 95% (30 Dec 2023 06:21) (95% - 99%)    Parameters below as of 30 Dec 2023 06:21  Patient On (Oxygen Delivery Method): room air      CAPILLARY BLOOD GLUCOSE        I&O's Summary        Appearance: Normal	  HEENT:   Normal oral mucosa, PERRL, EOMI	  Lymphatic: No lymphadenopathy  Cardiovascular: Normal S1 S2, No JVD  Respiratory: Lungs clear to auscultation	  Gastrointestinal:  Soft, Non-tender, + BS	  Skin: No rash, No ecchymoses	  Extremities:     LABS:                        12.7   6.49  )-----------( 223      ( 29 Dec 2023 09:39 )             39.8     12-29    137  |  104  |  15  ----------------------------<  94  4.1   |  22  |  0.78    Ca    9.0      29 Dec 2023 09:39  Mg     2.5     12-29    TPro  7.9  /  Alb  3.7  /  TBili  0.6  /  DBili  x   /  AST  18  /  ALT  9<L>  /  AlkPhos  104  12-29          Urinalysis Basic - ( 29 Dec 2023 09:39 )    Color: x / Appearance: x / SG: x / pH: x  Gluc: 94 mg/dL / Ketone: x  / Bili: x / Urobili: x   Blood: x / Protein: x / Nitrite: x   Leuk Esterase: x / RBC: x / WBC x   Sq Epi: x / Non Sq Epi: x / Bacteria: x                      Consultant(s) Notes Reviewed:      Care Discussed with Consultants/Other Providers:

## 2023-12-30 NOTE — PHYSICAL THERAPY INITIAL EVALUATION ADULT - NSPTDISCHREC_GEN_A_CORE
if home, at this time pt would require hospital bed, corrina lift, commode, w/c, and 24/7 assistance./Sub-acute Rehab

## 2023-12-30 NOTE — PROGRESS NOTE ADULT - SUBJECTIVE AND OBJECTIVE BOX
Date of Service: 12-30-23 @ 15:32           CARDIOLOGY     PROGRESS  NOTE   ________________________________________________    CHIEF COMPLAINT:Patient is a 88y old  Female who presents with a chief complaint of fall/ syncope (30 Dec 2023 06:44)  comfortable  	  REVIEW OF SYSTEMS:  CONSTITUTIONAL: No fever, weight loss, or fatigue  EYES: No eye pain, visual disturbances, or discharge  ENT:  No difficulty hearing, tinnitus, vertigo; No sinus or throat pain  NECK: No pain or stiffness  RESPIRATORY: No cough, wheezing, chills or hemoptysis; No Shortness of Breath  CARDIOVASCULAR: No chest pain, palpitations, passing out, dizziness, or leg swelling  GASTROINTESTINAL: No abdominal or epigastric pain. No nausea, vomiting, or hematemesis; No diarrhea or constipation. No melena or hematochezia.  GENITOURINARY: No dysuria, frequency, hematuria, or incontinence  NEUROLOGICAL: No headaches, memory loss, loss of strength, numbness, or tremors  SKIN: No itching, burning, rashes, or lesions   LYMPH Nodes: No enlarged glands  ENDOCRINE: No heat or cold intolerance; No hair loss  MUSCULOSKELETAL: No joint pain or swelling; No muscle, back, or extremity pain  PSYCHIATRIC: No depression, anxiety, mood swings, or difficulty sleeping  HEME/LYMPH: No easy bruising, or bleeding gums  ALLERGY AND IMMUNOLOGIC: No hives or eczema	    [ ] All others negative	  [x ] Unable to obtain    PHYSICAL EXAM:  T(C): 36.7 (12-30-23 @ 14:26), Max: 36.9 (12-29-23 @ 19:15)  HR: 84 (12-30-23 @ 14:26) (84 - 97)  BP: 142/71 (12-30-23 @ 14:26) (134/65 - 147/69)  RR: 20 (12-30-23 @ 14:26) (20 - 20)  SpO2: 95% (12-30-23 @ 14:26) (95% - 99%)  Wt(kg): --  I&O's Summary      Appearance: Normal	  HEENT:   Normal oral mucosa, PERRL, EOMI	  Lymphatic: No lymphadenopathy  Cardiovascular: Normal S1 S2, No JVD, +murmurs, No edema  Respiratory: rhonchi  Gastrointestinal:  Soft, Non-tender, + BS	  Skin: No rashes, No ecchymoses, No cyanosis	  Extremities: Normal range of motion, No clubbing, cyanosis or edema  Vascular: Peripheral pulses palpable 2+ bilaterally    MEDICATIONS  (STANDING):  aspirin enteric coated 81 milliGRAM(s) Oral daily  atorvastatin 40 milliGRAM(s) Oral at bedtime  heparin   Injectable 5000 Unit(s) SubCutaneous every 12 hours  levothyroxine 75 MICROGram(s) Oral daily  oseltamivir 30 milliGRAM(s) Oral two times a day  pantoprazole    Tablet 40 milliGRAM(s) Oral before breakfast  senna 2 Tablet(s) Oral at bedtime      TELEMETRY: 	    ECG:  	  RADIOLOGY:  OTHER: 	  	  LABS:	 	    CARDIAC MARKERS:                                12.4   4.81  )-----------( 221      ( 30 Dec 2023 07:05 )             39.4     12-30    136  |  102  |  15  ----------------------------<  114<H>  3.8   |  20<L>  |  0.68    Ca    8.5      30 Dec 2023 07:05  Mg     2.5     12-29    TPro  7.9  /  Alb  3.7  /  TBili  0.6  /  DBili  x   /  AST  18  /  ALT  9<L>  /  AlkPhos  104  12-29    proBNP:   Lipid Profile:   HgA1c:   TSH:     Flu With COVID-19 By LUIS A (12.29.23 @ 16:09)    SARS-CoV-2 Result: [facility]: This Respiratory Panel uses polymerase chain reaction (PCR) to detect for  influenza A; influenza B; respiratory syncytial virus; and SARS-CoV-2.  This test was validated by [facility] and is in use under the FDA  Emergency Use Authorization (EUA) for clinical labs CLIA-certified to  perform high complexity testing. Test results should be correlated with  clinical presentation, patient history, and epidemiology.   Influenza A Result: Detected   [facility]: [facility]   Resp Syn Virus Result: [facility]          Assessment and plan  ---------------------------  88 year old female with hx of hypothyroidism, arthritis bilat hip replacements comes into the ED via EMS after unwitnessed fall this morning. Pt lives alone and states she woke up at 3am which is normal for her. Last thing she remembers is eating breakfast and then she remembers finding herself on the ground. She is not on any AC and does not remember having any dizziness, palpitations, chest pain or other symptoms prior to the fall. After waking up she reports having pain in the left lower extremity specifically in the left hip. She also reports she has been having a cough over the past few days. She denies any HA, neck pain, back pain, chest pain, change in vision, abd pain, n/v, urinary symptoms, sensory changes or motor weakness  syncope ?sec to dehydration/ influenza  iv hydration  ct scan noted ?colitis ?ischemia gi has been called  hypothyroid/  tsh  asa daily  TTE  started on Tamiflu  check orthostatic  dvt prophylaxis    	         Date of Service: 12-30-23 @ 15:32           CARDIOLOGY     PROGRESS  NOTE   ________________________________________________    CHIEF COMPLAINT:Patient is a 88y old  Female who presents with a chief complaint of fall/ syncope (30 Dec 2023 06:44)  comfortable  	  REVIEW OF SYSTEMS:  CONSTITUTIONAL: No fever, weight loss, or fatigue  EYES: No eye pain, visual disturbances, or discharge  ENT:  No difficulty hearing, tinnitus, vertigo; No sinus or throat pain  NECK: No pain or stiffness  RESPIRATORY: No cough, wheezing, chills or hemoptysis; No Shortness of Breath  CARDIOVASCULAR: No chest pain, palpitations, passing out, dizziness, or leg swelling  GASTROINTESTINAL: No abdominal or epigastric pain. No nausea, vomiting, or hematemesis; No diarrhea or constipation. No melena or hematochezia.  GENITOURINARY: No dysuria, frequency, hematuria, or incontinence  NEUROLOGICAL: No headaches, memory loss, loss of strength, numbness, or tremors  SKIN: No itching, burning, rashes, or lesions   LYMPH Nodes: No enlarged glands  ENDOCRINE: No heat or cold intolerance; No hair loss  MUSCULOSKELETAL: No joint pain or swelling; No muscle, back, or extremity pain  PSYCHIATRIC: No depression, anxiety, mood swings, or difficulty sleeping  HEME/LYMPH: No easy bruising, or bleeding gums  ALLERGY AND IMMUNOLOGIC: No hives or eczema	    [ ] All others negative	  [x ] Unable to obtain    PHYSICAL EXAM:  T(C): 36.7 (12-30-23 @ 14:26), Max: 36.9 (12-29-23 @ 19:15)  HR: 84 (12-30-23 @ 14:26) (84 - 97)  BP: 142/71 (12-30-23 @ 14:26) (134/65 - 147/69)  RR: 20 (12-30-23 @ 14:26) (20 - 20)  SpO2: 95% (12-30-23 @ 14:26) (95% - 99%)  Wt(kg): --  I&O's Summary      Appearance: Normal	  HEENT:   Normal oral mucosa, PERRL, EOMI	  Lymphatic: No lymphadenopathy  Cardiovascular: Normal S1 S2, No JVD, +murmurs, No edema  Respiratory: rhonchi  Gastrointestinal:  Soft, Non-tender, + BS	  Skin: No rashes, No ecchymoses, No cyanosis	  Extremities: Normal range of motion, No clubbing, cyanosis or edema  Vascular: Peripheral pulses palpable 2+ bilaterally    MEDICATIONS  (STANDING):  aspirin enteric coated 81 milliGRAM(s) Oral daily  atorvastatin 40 milliGRAM(s) Oral at bedtime  heparin   Injectable 5000 Unit(s) SubCutaneous every 12 hours  levothyroxine 75 MICROGram(s) Oral daily  oseltamivir 30 milliGRAM(s) Oral two times a day  pantoprazole    Tablet 40 milliGRAM(s) Oral before breakfast  senna 2 Tablet(s) Oral at bedtime      TELEMETRY: 	    ECG:  	  RADIOLOGY:  OTHER: 	  	  LABS:	 	    CARDIAC MARKERS:                                12.4   4.81  )-----------( 221      ( 30 Dec 2023 07:05 )             39.4     12-30    136  |  102  |  15  ----------------------------<  114<H>  3.8   |  20<L>  |  0.68    Ca    8.5      30 Dec 2023 07:05  Mg     2.5     12-29    TPro  7.9  /  Alb  3.7  /  TBili  0.6  /  DBili  x   /  AST  18  /  ALT  9<L>  /  AlkPhos  104  12-29    proBNP:   Lipid Profile:   HgA1c:   TSH:     Flu With COVID-19 By LUIS A (12.29.23 @ 16:09)    SARS-CoV-2 Result: [facility]: This Respiratory Panel uses polymerase chain reaction (PCR) to detect for  influenza A; influenza B; respiratory syncytial virus; and SARS-CoV-2.  This test was validated by [facility] and is in use under the FDA  Emergency Use Authorization (EUA) for clinical labs CLIA-certified to  perform high complexity testing. Test results should be correlated with  clinical presentation, patient history, and epidemiology.   Influenza A Result: Detected   [facility]   Resp Syn Virus Result: [facility]          Assessment and plan  ---------------------------  88 year old female with hx of hypothyroidism, arthritis bilat hip replacements comes into the ED via EMS after unwitnessed fall this morning. Pt lives alone and states she woke up at 3am which is normal for her. Last thing she remembers is eating breakfast and then she remembers finding herself on the ground. She is not on any AC and does not remember having any dizziness, palpitations, chest pain or other symptoms prior to the fall. After waking up she reports having pain in the left lower extremity specifically in the left hip. She also reports she has been having a cough over the past few days. She denies any HA, neck pain, back pain, chest pain, change in vision, abd pain, n/v, urinary symptoms, sensory changes or motor weakness  syncope ?sec to dehydration/ influenza  iv hydration  ct scan noted ?colitis ?ischemia gi has been called  hypothyroid/  tsh  asa daily  TTE  started on Tamiflu  check orthostatic  dvt prophylaxis

## 2023-12-30 NOTE — CONSULT NOTE ADULT - SUBJECTIVE AND OBJECTIVE BOX
Initial GI Consult    Patient is a 88y old  Female who presents with a chief complaint of fall/ syncope (30 Dec 2023 15:31)    HPI: 88 year old female with hx of hypothyroidism, arthritis bilat hip replacements comes into the ED via EMS after unwitnessed fall/ syncope. GI consulted for thickening in sigmoid with ?fistula. Patient now feeling well. She denies any diarrhea or bloody BM. Last BM was 2-3 days ago. She usually goes every few days and is usually constipated. Patient denies fevers and chill.s She denies family or personal history of IBD or other bowel disorders. Denies family hx of colon ca. Patient last had EGD a few years ago for a stomach ulcer. Her last colonoscopy was many years ago and was reportedly normal.     PMH/PSH:  PAST MEDICAL & SURGICAL HISTORY:  Hypothyroid      Arthritis      Stomach ulcer      Hernia, hiatal      Spondylitis      Former smoker, stopped smoking many years ago      Hypothyroidism, unspecified type      History of bilateral hip replacements      Status post left knee replacement      No significant past surgical history        FH:  FAMILY HISTORY:  No pertinent family history in first degree relatives        MEDS:  MEDICATIONS  (STANDING):  aspirin enteric coated 81 milliGRAM(s) Oral daily  atorvastatin 40 milliGRAM(s) Oral at bedtime  heparin   Injectable 5000 Unit(s) SubCutaneous every 12 hours  levothyroxine 75 MICROGram(s) Oral daily  oseltamivir 30 milliGRAM(s) Oral two times a day  pantoprazole    Tablet 40 milliGRAM(s) Oral before breakfast  senna 2 Tablet(s) Oral at bedtime    MEDICATIONS  (PRN):  HYDROmorphone  Injectable 0.2 milliGRAM(s) IV Push every 8 hours PRN Severe Pain (7 - 10)  oxyCODONE    IR 5 milliGRAM(s) Oral every 8 hours PRN Moderate Pain (4 - 6)    Allergies    vancomycin (Blisters)    Intolerances        CONSTITUTIONAL:  No weight loss, fever, chills, weakness or fatigue.  HEENT:  Eyes:  No visual loss, blurred vision, double vision or yellow sclerae.  SKIN:  No rash or itching.  CARDIOVASCULAR:  No chest pain, chest pressure or chest discomfort.  RESPIRATORY:  No shortness of breath, cough or sputum.  GASTROINTESTINAL:  SEE HPI  GENITOURINARY:  No dysuria, hematuria, urinary frequency  NEUROLOGICAL:  No headache, dizziness, syncope, paralysis, ataxia, numbness or tingling in the extremities.  MUSCULOSKELETAL:  No muscle, back pain, joint pain or stiffness.  ENDOCRINOLOGIC:  No reports of sweating, cold or heat intolerance. No polyuria or polydipsia.      ______________________________________________________________________  PHYSICAL EXAM:  T(C): 36.7 (12-30-23 @ 14:26), Max: 36.9 (12-29-23 @ 19:15)  HR: 84 (12-30-23 @ 14:26)  BP: 142/71 (12-30-23 @ 14:26)  RR: 20 (12-30-23 @ 14:26)  SpO2: 95% (12-30-23 @ 14:26)  Wt(kg): --      GEN: NAD, normocephalic  CVS: S1S2+  CHEST: clear to auscultation  ABD: soft , nontender, nondistended, bowel sounds present  EXTR: no cyanosis, no clubbing, no edema  NEURO: A&OX  SKIN:  warm;  non icteric    ______________________________________________________________________  LABS:                        12.4   4.81  )-----------( 221      ( 30 Dec 2023 07:05 )             39.4     12-30    136  |  102  |  15  ----------------------------<  114<H>  3.8   |  20<L>  |  0.68    Ca    8.5      30 Dec 2023 07:05  Mg     2.5     12-29    TPro  7.9  /  Alb  3.7  /  TBili  0.6  /  DBili  x   /  AST  18  /  ALT  9<L>  /  AlkPhos  104  12-29    LIVER FUNCTIONS - ( 29 Dec 2023 09:39 )  Alb: 3.7 g/dL / Pro: 7.9 g/dL / ALK PHOS: 104 U/L / ALT: 9 U/L / AST: 18 U/L / GGT: x             ____________________________________________    < from: CT Pelvis Bony Only No Cont (12.29.23 @ 11:11) >    OSSEOUS STRUCTURES    Fractures:  None.    VISUALIZED SPINE  Moderate to severe lower lumbar degenerative disc disease is present with   grade 2 anterolisthesis at L5-S1. There is osseous fusion of the L5-S1   facet joints and moderate spinal canal stenosis at this level.    PELVIC JOINTS    Sacroiliac Joints:  Mild arthrosis is present bilaterally.    Symphysis Pubis:  Mild arthrosis is present.    RIGHT HIP JOINT.    Postoperative Changes: Total hip arthroplasty is again seen with a   press-fit femoral component. No periprosthetic lucency is appreciated.   There is mild to moderate heterotopic ossification.    LEFT HIP JOINT    Postoperative Changes: Total hip arthroplasty is again seen with   press-fit femoral component. Mild sclerosis/distal formation is again   seen along the tip of the femoral stem. No periprosthetic lucency is   appreciated.    SOFT TISSUES    Neurovascular:  Mildto moderate atherosclerotic ossifications are   present.    Pelvis/Abdomen:  Tiny layering gallstones are noted within the   gallbladder. Colonic diverticuli are present. Mild to moderate wall   thickening involves an approximately 13 cm segment of the upper sigmoid   colon with linear soft tissue tract extending to the mid sigmoid colon as   illustrated on axial series 4 image 53..    Musculature:  Mild to moderate muscle atrophy is noted.    Subcutaneous Tissues:  Right gluteal injection granuloma are noted.    IMPRESSION:  1.  No fractures are seen.  2.  Long segment of mild to moderate wall thickening involves the upper   sigmoid colon with a soft tissue tract extending to the mid sigmoid   colon. These changes could reflect scarring fromprior inflammation and a   fistulous tract. Neoplasm cannot be absolutely excluded.  3.  Correlation with other prior imaging or prior colonoscopy is   suggested if available. Follow-up CT of the abdomen and pelvis with oral   and/or rectal contrast may be helpful to assess for a fistula. Consider   colonoscopy as warranted.    < end of copied text >

## 2023-12-30 NOTE — PHYSICAL THERAPY INITIAL EVALUATION ADULT - GAIT TRAINING, PT EVAL
GOAL: Pt will be able to ambulate 50 feet with min A with appropriate assistive device in 1-2 weeks.

## 2023-12-30 NOTE — PHYSICAL THERAPY INITIAL EVALUATION ADULT - ADDITIONAL COMMENTS
Pt reports she lives alone in an elevator accessible apt.  Pt reports previously independent in mobility with a SC at times, RW at others.

## 2023-12-30 NOTE — PATIENT PROFILE ADULT - FALL HARM RISK - HARM RISK INTERVENTIONS
Assistance with ambulation/Assistance OOB with selected safe patient handling equipment/Communicate Risk of Fall with Harm to all staff/Monitor for mental status changes/Move patient closer to nurses' station/Reinforce activity limits and safety measures with patient and family/Reorient to person, place and time as needed/Tailored Fall Risk Interventions/Toileting schedule using arm’s reach rule for commode and bathroom/Use of alarms - bed, chair and/or voice tab/Visual Cue: Yellow wristband and red socks/Bed in lowest position, wheels locked, appropriate side rails in place/Call bell, personal items and telephone in reach/Instruct patient to call for assistance before getting out of bed or chair/Non-slip footwear when patient is out of bed/[place] to call system/Physically safe environment - no spills, clutter or unnecessary equipment/Purposeful Proactive Rounding/Room/bathroom lighting operational, light cord in reach Assistance with ambulation/Assistance OOB with selected safe patient handling equipment/Communicate Risk of Fall with Harm to all staff/Monitor for mental status changes/Move patient closer to nurses' station/Reinforce activity limits and safety measures with patient and family/Reorient to person, place and time as needed/Tailored Fall Risk Interventions/Toileting schedule using arm’s reach rule for commode and bathroom/Use of alarms - bed, chair and/or voice tab/Visual Cue: Yellow wristband and red socks/Bed in lowest position, wheels locked, appropriate side rails in place/Call bell, personal items and telephone in reach/Instruct patient to call for assistance before getting out of bed or chair/Non-slip footwear when patient is out of bed/[place] to call system/Physically safe environment - no spills, clutter or unnecessary equipment/Purposeful Proactive Rounding/Room/bathroom lighting operational, light cord in reach

## 2023-12-30 NOTE — PHYSICAL THERAPY INITIAL EVALUATION ADULT - PERTINENT HX OF CURRENT PROBLEM, REHAB EVAL
88 year old female with PMHx hypothyroidism, arthritis bilat hip replacements /  olecranon fx/ prior h/o  dizziness arrives to [facility] via EMS after unwitnessed fall this morning. pt  lives alone /  woke up at 3am which is normal for her. last remembers is eating breakfast and then she remembers finding herself on the ground.  Admitted for syncope work up. Pt denies  dizziness, palpitations, chest pain/sob/ abd pain upon waking up she reports having pain in the left lower extremity with difficulty  in ambulation since  syncopal event. Hospital course:  Pt with positive flu swab in ED.  Imagin23:Chest xray: Exam is partially limited secondary to patient's head overlying bilateral apices. Bibasilar atelectasis. Femur/Pelvic/Knee Xrays all negative for actue fracture/dislocation/ No periprosthetic fractures or suspicious periprosthetic lucencies in imaged regions, CT Pelvis negative for acute fracture/dislocation, CT Head: Left basal ganglia infarction is an interval finding since , No evidence of acute intracranial injury.  Work up / treatment ongonig. 88 year old female with PMHx hypothyroidism, arthritis bilat hip replacements /  olecranon fx/ prior h/o  dizziness arrives to [facility] via EMS after unwitnessed fall this morning. pt  lives alone /  woke up at 3am which is normal for her. last remembers is eating breakfast and then she remembers finding herself on the ground.  Admitted for syncope work up. Pt denies  dizziness, palpitations, chest pain/sob/ abd pain upon waking up she reports having pain in the left lower extremity with difficulty  in ambulation since  syncopal event. Hospital course:  Pt with positive flu swab in ED.  Imagin23:Chest xray: Exam is partially limited secondary to patient's head overlying bilateral apices. Bibasilar atelectasis. Femur/Pelvic/Knee Xrays all negative for actue fracture/dislocation/ No periprosthetic fractures or suspicious periprosthetic lucencies in imaged regions, CT Pelvis negative for acute fracture/dislocation, CT Head: Left basal ganglia infarction is an interval finding since , No evidence of acute intracranial injury.  Work up / treatment ongonig.

## 2023-12-30 NOTE — PROGRESS NOTE ADULT - ASSESSMENT
88 year old female      h/o  hypothyroidism, arthritis bilat hip replacements /  olecranon fx/ prior h/o  dizziness      arrives  via EMS after unwitnessed fall this morning.       pt  lives alone /  woke up at 3am which is normal for her. lasr   remembers is eating breakfast and then she remembers finding herself on the ground.     denies  dizziness, palpitations, chest pain/sob/ abd pain       upon waking up she reports having pain in the left lower extremity ./   has  difficulty  in ambulation since  syncopal event/  xrays  in er,  no fx      syncope  / collapse /  cause   unknown      tele   Raunaud;s disease/ fingers   Ct   head,  left b/ ganglia infarct/ not reported  asacute      on asa/  lipitor   Hypothyroid     h/o b/l hip surg, in the past    xrays, no fx/  has  rom  of  b/l legs/ has difficulty on ambulation,  from hip  pain,  s/p  collapse   has no abd pain     CT  pelvis,  thickened   sigmoid  colon/ with ?  fistula/ ?  mass        house  gi eval  calked    on dvt ppx     fall risk    echo   Influenza  +,  on tamiflu     lives  alone. has  no children/   and is  full  code         ad< from: CT 3D Reconstruct w/ Workstation (12.29.23 @ 11:11) >  IMPRESSION:  1.  No fractures are seen.  2.  Long segment of mild to moderate wall thickening involves the upper   sigmoid colon with a soft tissue tract extending to the mid sigmoid   colon. These changes could reflect scarring fromprior inflammation and a   fistulous tract. Neoplasm cannot be absolutely excluded.  3.  Correlation with other prior imaging or prior colonoscopy is   suggested if available. Follow-up CT of the abdomen and pelvis with oral   and/or rectal contrast may be helpful to assess for a fistula. Consider   colonoscopy as warranted.  --- End of Report ---      rad< from: CT Head No Cont (12.29.23 @ 11:12) >  IMPRESSION:  1. 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 No evidence of acute intracranial injury.  --- End of Report ---            <

## 2023-12-31 ENCOUNTER — TRANSCRIPTION ENCOUNTER (OUTPATIENT)
Age: 88
End: 2023-12-31

## 2023-12-31 LAB
TSH SERPL-MCNC: 2.72 UIU/ML — SIGNIFICANT CHANGE UP (ref 0.27–4.2)
TSH SERPL-MCNC: 2.72 UIU/ML — SIGNIFICANT CHANGE UP (ref 0.27–4.2)

## 2023-12-31 RX ORDER — OXYCODONE HYDROCHLORIDE 5 MG/1
2.5 TABLET ORAL EVERY 8 HOURS
Refills: 0 | Status: DISCONTINUED | OUTPATIENT
Start: 2023-12-31 | End: 2024-01-03

## 2023-12-31 RX ORDER — ASCORBIC ACID 60 MG
500 TABLET,CHEWABLE ORAL DAILY
Refills: 0 | Status: DISCONTINUED | OUTPATIENT
Start: 2023-12-31 | End: 2024-01-01

## 2023-12-31 RX ADMIN — OXYCODONE HYDROCHLORIDE 2.5 MILLIGRAM(S): 5 TABLET ORAL at 22:07

## 2023-12-31 RX ADMIN — SENNA PLUS 2 TABLET(S): 8.6 TABLET ORAL at 21:06

## 2023-12-31 RX ADMIN — Medication 81 MILLIGRAM(S): at 12:56

## 2023-12-31 RX ADMIN — HEPARIN SODIUM 5000 UNIT(S): 5000 INJECTION INTRAVENOUS; SUBCUTANEOUS at 19:16

## 2023-12-31 RX ADMIN — Medication 75 MICROGRAM(S): at 05:57

## 2023-12-31 RX ADMIN — HEPARIN SODIUM 5000 UNIT(S): 5000 INJECTION INTRAVENOUS; SUBCUTANEOUS at 05:53

## 2023-12-31 RX ADMIN — PANTOPRAZOLE SODIUM 40 MILLIGRAM(S): 20 TABLET, DELAYED RELEASE ORAL at 05:51

## 2023-12-31 RX ADMIN — OXYCODONE HYDROCHLORIDE 2.5 MILLIGRAM(S): 5 TABLET ORAL at 21:05

## 2023-12-31 RX ADMIN — Medication 30 MILLIGRAM(S): at 10:09

## 2023-12-31 RX ADMIN — HYDROMORPHONE HYDROCHLORIDE 0.2 MILLIGRAM(S): 2 INJECTION INTRAMUSCULAR; INTRAVENOUS; SUBCUTANEOUS at 13:50

## 2023-12-31 RX ADMIN — Medication 30 MILLIGRAM(S): at 19:16

## 2023-12-31 RX ADMIN — HYDROMORPHONE HYDROCHLORIDE 0.2 MILLIGRAM(S): 2 INJECTION INTRAMUSCULAR; INTRAVENOUS; SUBCUTANEOUS at 13:28

## 2023-12-31 RX ADMIN — ATORVASTATIN CALCIUM 40 MILLIGRAM(S): 80 TABLET, FILM COATED ORAL at 21:06

## 2023-12-31 NOTE — CHART NOTE - NSCHARTNOTEFT_GEN_A_CORE
Had extensive conversation with patients Niece and HCP, Kerline (# in emergency contact) who states that she would not want to pursue further workup of sigmoid thickening with a colonoscopy. We spoke about risks of colonoscopy including risk of bleed and perforation as well as benefits (ie- finding out what thickening is such as malignancy); She would not want to purse further invasive measures at this time given patients age and comorbidities   - We can place patient on PPI BID for GERD as patients niece tells me that she has long hx of stomach pain with daily wine use   - GI team will sign off    Please call back with any further questions     Jessica Kimball MD  Gastroenterology/Hepatology Fellow, PGY-4  Please contact via TEAMS    NON-URGENT CONSULTS:  Please email [email or web address] Had extensive conversation with patients Niece and HCP, Kerline (# in emergency contact) who states that she would not want to pursue further workup of sigmoid thickening with a colonoscopy. We spoke about risks of colonoscopy including risk of bleed and perforation as well as benefits (ie- finding out what thickening is such as malignancy); She would not want to purse further invasive measures at this time given patients age and comorbidities   - We can place patient on PPI BID for GERD as patients niece tells me that she has long hx of stomach pain with daily wine use   - GI team will sign off    Please call back with any further questions     Jessica Kimball MD  Gastroenterology/Hepatology Fellow, PGY-4  Please contact via TEAMS    NON-URGENT CONSULTS:  Please email [email or web address]

## 2023-12-31 NOTE — DISCHARGE NOTE PROVIDER - NSDCCPCAREPLAN_GEN_ALL_CORE_FT
PRINCIPAL DISCHARGE DIAGNOSIS  Diagnosis: Syncope and collapse  Assessment and Plan of Treatment: Rehab has been recommended for you for conditioning and strenghthening but you are refusing rehab.  Home care will be provided for you      SECONDARY DISCHARGE DIAGNOSES  Diagnosis: Influenza in adult  Assessment and Plan of Treatment: Improved on Tamiflu      Diagnosis: Sigmoid thickening  Assessment and Plan of Treatment: You were seen by GI and colonoscopy was recommended.  Follow up with GI out patient     PRINCIPAL DISCHARGE DIAGNOSIS  Diagnosis: Syncope and collapse  Assessment and Plan of Treatment: Rehab has been recommended for you for conditioning and strenghthening but you are refusing rehab.  Home care will be provided for you      SECONDARY DISCHARGE DIAGNOSES  Diagnosis: Influenza in adult  Assessment and Plan of Treatment: Improved on Tamiflu      Diagnosis: Sigmoid thickening  Assessment and Plan of Treatment: You were seen by GI and colonoscopy was recommended.  Follow up with GI out patient    Diagnosis: UTI (urinary tract infection)  Assessment and Plan of Treatment: You had a bladder and/or kidney infection. Continue antibiotics as prescribed. You will need to finish the medication as prescribed to reduced the likelihood that the infection will recur.  Avoid medications that will cause urinary retention such as benadryl whenever possible.  Drink adequate fluids - there is no harm in drinking cranberry juice.  Females should urinate right after sex.  Contact your doctor if you experience new symptoms or continued symptoms after treatment, such as pain or burning with urination, frequent urination, urinary urgency, blood in the urine, fever, back pain, and/or nausea vomiting.       PRINCIPAL DISCHARGE DIAGNOSIS  Diagnosis: Syncope and collapse  Assessment and Plan of Treatment: Rehab has been recommended for you for conditioning and strenghthening      SECONDARY DISCHARGE DIAGNOSES  Diagnosis: Influenza in adult  Assessment and Plan of Treatment: Improved on Tamiflu      Diagnosis: Sigmoid thickening  Assessment and Plan of Treatment: You were seen by GI and colonoscopy was recommended.  Follow up with GI out patient    Diagnosis: UTI (urinary tract infection)  Assessment and Plan of Treatment: You had a bladder and/or kidney infection. Continue antibiotics as prescribed. You will need to finish the medication as prescribed to reduced the likelihood that the infection will recur.  Avoid medications that will cause urinary retention such as benadryl whenever possible.  Drink adequate fluids - there is no harm in drinking cranberry juice.  Females should urinate right after sex.  Contact your doctor if you experience new symptoms or continued symptoms after treatment, such as pain or burning with urination, frequent urination, urinary urgency, blood in the urine, fever, back pain, and/or nausea vomiting.

## 2023-12-31 NOTE — DISCHARGE NOTE PROVIDER - NSDCFUADDAPPT_GEN_ALL_CORE_FT
APPTS ARE READY TO BE MADE: [x] YES    Best Family or Patient Contact (if needed):    Additional Information about above appointments (if needed):    1:   2:   3:     Other comments or requests:    APPTS ARE READY TO BE MADE: [x] YES    Best Family or Patient Contact (if needed):    Additional Information about above appointments (if needed):    1:   2:   3:     Other comments or requests:       Patient/Caregiver was provided with follow up request details but will contact us to schedule if assistance is needed.

## 2023-12-31 NOTE — DISCHARGE NOTE PROVIDER - HOSPITAL COURSE
88 year old female      h/o  hypothyroidism, arthritis bilat hip replacements /  olecranon fx/ prior h/o  dizziness      arrives  via EMS after unwitnessed fall this morning.       pt  lives alone /  woke up at 3am which is normal for her. . was eating breakfast and then she remembers finding herself on the ground.     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  fistula/ ?  mass         hcp  does  not  want  any intervention    on dvt ppx     fall risk      88 year old female      h/o  hypothyroidism, arthritis bilat hip replacements /  olecranon fx/ prior h/o  dizziness      arrives  via EMS after unwitnessed fall this morning.       pt  lives alone /  woke up at 3am which is normal for her. . was eating breakfast and then she remembers finding herself on the ground.     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  fistula/ ?  mass         hcp  does  not  want  any intervention    uti, on cipro    on dvt ppx     fall risk      88 year old female      h/o  hypothyroidism, arthritis bilat hip replacements /  olecranon fx/ prior h/o  dizziness      arrives  via EMS after unwitnessed fall this morning.       pt  lives alone /  woke up at 3am which is normal for her. . was eating breakfast and then she remembers finding herself on the ground.     denies  dizziness, palpitations, chest pain/sob/ abd pain      pain in the left lower extremity ./   has  difficulty  in ambulation since  syncopal event/  xrays  in er,  no fx      syncope  / collapse /  cause   unknown/  suspect  from  influenza     influenza.   on tamiflu   Raunaud;s  disease/ fingers   Ct   head,  left b/ ganglia infarct/ not reported  as  acute       on asa/  lipitor   Hypothyroid     h/o b/l hip surg, in the past    xrays, no fx/  has  rom  of  b/l legs/ has difficulty on ambulation,  from hip  pain,  s/p  collapse   has no abd pain     CT  pelvis,  thickened   sigmoid  colon/ with ?  fistula/ ?  mass     on dvt ppx /   fall risk     ct  head. 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  mri   dept  made  aware  by  np    per house  gi.  will  need  colonoscopy    and  per  hcp,  does  not   want  any  intervention / testing / and  is  aware  that  pt  may  have  a  malignant   process     uti, Proteus,  on  cipro   neuro  dr danielson..   and   hcp  does  not wnat mri/  carotid  dopplers  per  PT,   rehab was  recommended/  hcp  wants  home    d/c  plans   /  home today      hcp  wants  home  as dispo    lives  alone. has  no children/   and is  full  code    dallas Churchill  is  hcp/   [phone] 88 year old female      h/o  hypothyroidism, arthritis bilat hip replacements /  olecranon fx/ prior h/o  dizziness      arrives  via EMS after unwitnessed fall this morning.       pt  lives alone /  woke up at 3am which is normal for her. . was eating breakfast and then she remembers finding herself on the ground.     denies  dizziness, palpitations, chest pain/sob/ abd pain      pain in the left lower extremity ./   has  difficulty  in ambulation since  syncopal event/  xrays  in er,  no fx      syncope  / collapse /  cause   unknown/  suspect  from  influenza     influenza.   on tamiflu   Raunaud;s  disease/ fingers   Ct   head,  left b/ ganglia infarct/ not reported  as  acute       on asa/  lipitor   Hypothyroid     h/o b/l hip surg, in the past    xrays, no fx/  has  rom  of  b/l legs/ has difficulty on ambulation,  from hip  pain,  s/p  collapse   has no abd pain     CT  pelvis,  thickened   sigmoid  colon/ with ?  fistula/ ?  mass     on dvt ppx /   fall risk     ct  head. 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  mri   dept  made  aware  by  np    per house  gi.  will  need  colonoscopy    and  per  hcp,  does  not   want  any  intervention / testing / and  is  aware  that  pt  may  have  a  malignant   process     uti, Proteus,  on  cipro   neuro  dr danielson..   and   hcp  does  not wnat mri/  carotid  dopplers  per  PT,   rehab was  recommended/  hcp  wants  home    d/c  plans   /  home today      hcp  wants  home  as dispo    lives  alone. has  no children/   and is  full  code    dallas Churchill  is  hcp/   [phone] 88 year old female      h/o  hypothyroidism, arthritis bilat hip replacements /  olecranon fx/ prior h/o  dizziness      arrives  via EMS after unwitnessed fall this morning.       pt  lives alone /  woke up at 3am which is normal for her. . was eating breakfast and then she remembers finding herself on the ground.     denies  dizziness, palpitations, chest pain/sob/ abd pain      pain in the left lower extremity ./   has  difficulty  in ambulation since  syncopal event/  xrays  in er,  no fx      syncope  / collapse /  cause   unknown/  suspect  from  influenza     influenza.   on tamiflu   Raunaud;s  disease/ fingers   Ct   head,  left b/ ganglia infarct/ not reported  as  acute       on asa/  lipitor   Hypothyroid     h/o b/l hip surg, in the past    xrays, no fx/  has  rom  of  b/l legs/ has difficulty on ambulation,  from hip  pain,  s/p  collapse   has no abd pain     CT  pelvis,  thickened   sigmoid  colon/ with ?  fistula/ ?  mass     on dvt ppx /   fall risk     ct  head. 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  mri   dept  made  aware  by  np    per house  gi.  will  need  colonoscopy    and  per  hcp,  does  not   want  any  intervention / testing / and  is  aware  that  pt  may  have  a  malignant   process     uti, Proteus,  on  cipro   neuro  dr danielson..   and   hcp  does  not wnat mri/  carotid  dopplers  per  PT,   rehab was  recommended/  hcp  wants  home    d/c  plans   to home 1/3 , was  the  plan     pt  still  here       upon speaking  with    dallas. , this  am,   found  out alysha who  stated , that ems   brought pt  home , and  then  pt refused to go  on her  bed/ remained  seated  in her  chair,  and then  was  belligerent  per  dallas,   hence  ems   brought pt   back to  hosp   per dallas,  now  wants  rehab       dallas Churchill  is  hcp/   [phone]     88 year old female      h/o  hypothyroidism, arthritis bilat hip replacements /  olecranon fx/ prior h/o  dizziness      arrives  via EMS after unwitnessed fall this morning.       pt  lives alone /  woke up at 3am which is normal for her. . was eating breakfast and then she remembers finding herself on the ground.     denies  dizziness, palpitations, chest pain/sob/ abd pain      pain in the left lower extremity ./   has  difficulty  in ambulation since  syncopal event/  xrays  in er,  no fx      syncope  / collapse /  cause   unknown/  suspect  from  influenza     influenza.   on tamiflu   Raunaud;s  disease/ fingers   Ct   head,  left b/ ganglia infarct/ not reported  as  acute       on asa/  lipitor   Hypothyroid     h/o b/l hip surg, in the past    xrays, no fx/  has  rom  of  b/l legs/ has difficulty on ambulation,  from hip  pain,  s/p  collapse   has no abd pain     CT  pelvis,  thickened   sigmoid  colon/ with ?  fistula/ ?  mass     on dvt ppx /   fall risk     ct  head. 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  mri   dept  made  aware  by  np    per house  gi.  will  need  colonoscopy    and  per  hcp,  does  not   want  any  intervention / testing / and  is  aware  that  pt  may  have  a  malignant   process     uti, Proteus,  on  cipro   neuro  dr danielson..   and   hcp  does  not wnat mri/  carotid  dopplers  per  PT,   rehab was  recommended/  hcp  wants  home    d/c  plans   to home 1/3 , was  the  plan     pt  still  here       upon speaking  with    dallas. , this  am,   found  out alysha who  stated , that ems   brought pt  home , and  then  pt refused to go  on her  bed/ remained  seated  in her  chair,  and then  was  belligerent  per  dallas,   hence  ems   brought pt   back to  hosp   per dallas,  now  wants  rehab       dallas Churchill  is  hcp/   [phone]

## 2023-12-31 NOTE — DIETITIAN INITIAL EVALUATION ADULT - PERTINENT LABORATORY DATA
12-30    136  |  102  |  15  ----------------------------<  114<H>  3.8   |  20<L>  |  0.68    Ca    8.5      30 Dec 2023 07:05

## 2023-12-31 NOTE — DISCHARGE NOTE PROVIDER - CARE PROVIDER_API CALL
Cody Kiser)  Internal Medicine  [address] [place] [id number]  Phone: [phone]  Fax: [phone]  Follow Up Time:    Cody Kiser)  Internal Medicine  [address] [place] [phone]  Phone: [phone]  Fax: [phone]  Follow Up Time:

## 2023-12-31 NOTE — DIETITIAN INITIAL EVALUATION ADULT - REASON FOR ADMISSION
Chart Reviewed, Events noted  "88 year old female with Hip Pain. PMH Hypothyroidism, Spondylitis, Hiatal Hernia, Stomach Ulcer, Arthritis."

## 2023-12-31 NOTE — DIETITIAN INITIAL EVALUATION ADULT - NSFNSPHYEXAMSKINFT_GEN_A_CORE
Per Nursing Flow Sheet Documentation:   -- Sacrum- Stage 1  -- Left Heel- Suspected deep tissue injury   -- Right Heel- Suspected deep tissue injury   -- Left 1st toe bunion- Suspected deep tissue injury   -- Left lateral fifth toe- Suspected deep tissue injury   -- Right first toe bunion-Suspected deep tissue injury   -- Right lateral fifth toe- Suspected deep tissue injury

## 2023-12-31 NOTE — DIETITIAN INITIAL EVALUATION ADULT - OTHER INFO
Nutrition-Related Concerns:  -- Endo: hx of hypothyroidism; ordered for PO levothyroxine   -- GI: hx of Stomach Ulcer; ordered for PO pantoprazole  Nutrition-Related Concerns:  -- Positive for FLU A, on Droplet precautions   -- Endo: hx of hypothyroidism; ordered for PO levothyroxine   -- GI: hx of Stomach Ulcer; ordered for PO pantoprazole

## 2023-12-31 NOTE — DIETITIAN INITIAL EVALUATION ADULT - REASON INDICATOR FOR ASSESSMENT
Stage 2 Pressure Injury or greater  Information obtained from: Review of pt's current medical record; Pt is hard of hearing and noted to be confused and disoriented, unable to participate in nutritional interview.

## 2023-12-31 NOTE — PROGRESS NOTE ADULT - SUBJECTIVE AND OBJECTIVE BOX
Date of Service: 12-31-23 @ 08:32           CARDIOLOGY     PROGRESS  NOTE   ________________________________________________    CHIEF COMPLAINT:Patient is a 88y old  Female who presents with a chief complaint of fall/ syncope (30 Dec 2023 17:29)  doing better still with sob  	  REVIEW OF SYSTEMS:  CONSTITUTIONAL: No fever, weight loss, or fatigue  EYES: No eye pain, visual disturbances, or discharge  ENT:  No difficulty hearing, tinnitus, vertigo; No sinus or throat pain  NECK: No pain or stiffness  RESPIRATORY: No cough, wheezing, chills or hemoptysis; + Shortness of Breath  CARDIOVASCULAR: No chest pain, palpitations, passing out, dizziness, or leg swelling  GASTROINTESTINAL: No abdominal or epigastric pain. No nausea, vomiting, or hematemesis; No diarrhea or constipation. No melena or hematochezia.  GENITOURINARY: No dysuria, frequency, hematuria, or incontinence  NEUROLOGICAL: No headaches, memory loss, loss of strength, numbness, or tremors  SKIN: No itching, burning, rashes, or lesions   LYMPH Nodes: No enlarged glands  ENDOCRINE: No heat or cold intolerance; No hair loss  MUSCULOSKELETAL: No joint pain or swelling; No muscle, back, or extremity pain  PSYCHIATRIC: No depression, anxiety, mood swings, or difficulty sleeping  HEME/LYMPH: No easy bruising, or bleeding gums  ALLERGY AND IMMUNOLOGIC: No hives or eczema	    [x ] All others negative	  [ ] Unable to obtain    PHYSICAL EXAM:  T(C): 36.7 (12-31-23 @ 04:40), Max: 36.8 (12-30-23 @ 22:11)  HR: 84 (12-31-23 @ 04:40) (84 - 86)  BP: 127/70 (12-31-23 @ 04:40) (127/70 - 148/78)  RR: 18 (12-31-23 @ 04:40) (18 - 20)  SpO2: 95% (12-31-23 @ 04:40) (95% - 99%)  Wt(kg): --  I&O's Summary      Appearance: Normal	  HEENT:   Normal oral mucosa, PERRL, EOMI	  Lymphatic: No lymphadenopathy  Cardiovascular: Normal S1 S2, No JVD, + murmurs, No edema  Respiratory: rhonchi  Gastrointestinal:  Soft, Non-tender, + BS	  Skin: No rashes, No ecchymoses, No cyanosis	  Extremities: Normal range of motion, No clubbing, cyanosis or edema  Vascular: Peripheral pulses palpable 2+ bilaterally    MEDICATIONS  (STANDING):  aspirin enteric coated 81 milliGRAM(s) Oral daily  atorvastatin 40 milliGRAM(s) Oral at bedtime  heparin   Injectable 5000 Unit(s) SubCutaneous every 12 hours  levothyroxine 75 MICROGram(s) Oral daily  oseltamivir 30 milliGRAM(s) Oral two times a day  pantoprazole    Tablet 40 milliGRAM(s) Oral before breakfast  senna 2 Tablet(s) Oral at bedtime      TELEMETRY: 	    ECG:  	  RADIOLOGY:  OTHER: 	  	  LABS:	 	    CARDIAC MARKERS:                          12.4   4.81  )-----------( 221      ( 30 Dec 2023 07:05 )             39.4     12-30    136  |  102  |  15  ----------------------------<  114<H>  3.8   |  20<L>  |  0.68    Ca    8.5      30 Dec 2023 07:05  Mg     2.5     12-29    TPro  7.9  /  Alb  3.7  /  TBili  0.6  /  DBili  x   /  AST  18  /  ALT  9<L>  /  AlkPhos  104  12-29    proBNP:   Lipid Profile:   HgA1c:   TSH:     < from: Xray Chest 1 View- PORTABLE-Urgent (12.29.23 @ 08:53) >  IMPRESSION:  Exam is partially limited secondary to patient's head overlying bilateral   apices.  Bibasilar atelectasis.      - Would send GI PCR/cdiff if having diarrhea   - monitor stool for blood   - can potentially plan for colonoscopy on Tuesday if within patients [facility]     Assessment and plan  ---------------------------  88 year old female with hx of hypothyroidism, arthritis bilat hip replacements comes into the ED via EMS after unwitnessed fall this morning. Pt lives alone and states she woke up at 3am which is normal for her. Last thing she remembers is eating breakfast and then she remembers finding herself on the ground. She is not on any AC and does not remember having any dizziness, palpitations, chest pain or other symptoms prior to the fall. After waking up she reports having pain in the left lower extremity specifically in the left hip. She also reports she has been having a cough over the past few days. She denies any HA, neck pain, back pain, chest pain, change in vision, abd pain, n/v, urinary symptoms, sensory changes or motor weakness  syncope ?sec to dehydration/ influenza  iv hydration  ct scan noted ?colitis ?ischemia gi has been called  hypothyroid/  tsh  asa daily  TTE  started on Tamiflu  check orthostatic  dvt prophylaxis  oob to chair with assistant   GI evaluation noted, check stool for c diff    	         Date of Service: 12-31-23 @ 08:32           CARDIOLOGY     PROGRESS  NOTE   ________________________________________________    CHIEF COMPLAINT:Patient is a 88y old  Female who presents with a chief complaint of fall/ syncope (30 Dec 2023 17:29)  doing better still with sob  	  REVIEW OF SYSTEMS:  CONSTITUTIONAL: No fever, weight loss, or fatigue  EYES: No eye pain, visual disturbances, or discharge  ENT:  No difficulty hearing, tinnitus, vertigo; No sinus or throat pain  NECK: No pain or stiffness  RESPIRATORY: No cough, wheezing, chills or hemoptysis; + Shortness of Breath  CARDIOVASCULAR: No chest pain, palpitations, passing out, dizziness, or leg swelling  GASTROINTESTINAL: No abdominal or epigastric pain. No nausea, vomiting, or hematemesis; No diarrhea or constipation. No melena or hematochezia.  GENITOURINARY: No dysuria, frequency, hematuria, or incontinence  NEUROLOGICAL: No headaches, memory loss, loss of strength, numbness, or tremors  SKIN: No itching, burning, rashes, or lesions   LYMPH Nodes: No enlarged glands  ENDOCRINE: No heat or cold intolerance; No hair loss  MUSCULOSKELETAL: No joint pain or swelling; No muscle, back, or extremity pain  PSYCHIATRIC: No depression, anxiety, mood swings, or difficulty sleeping  HEME/LYMPH: No easy bruising, or bleeding gums  ALLERGY AND IMMUNOLOGIC: No hives or eczema	    [x ] All others negative	  [ ] Unable to obtain    PHYSICAL EXAM:  T(C): 36.7 (12-31-23 @ 04:40), Max: 36.8 (12-30-23 @ 22:11)  HR: 84 (12-31-23 @ 04:40) (84 - 86)  BP: 127/70 (12-31-23 @ 04:40) (127/70 - 148/78)  RR: 18 (12-31-23 @ 04:40) (18 - 20)  SpO2: 95% (12-31-23 @ 04:40) (95% - 99%)  Wt(kg): --  I&O's Summary      Appearance: Normal	  HEENT:   Normal oral mucosa, PERRL, EOMI	  Lymphatic: No lymphadenopathy  Cardiovascular: Normal S1 S2, No JVD, + murmurs, No edema  Respiratory: rhonchi  Gastrointestinal:  Soft, Non-tender, + BS	  Skin: No rashes, No ecchymoses, No cyanosis	  Extremities: Normal range of motion, No clubbing, cyanosis or edema  Vascular: Peripheral pulses palpable 2+ bilaterally    MEDICATIONS  (STANDING):  aspirin enteric coated 81 milliGRAM(s) Oral daily  atorvastatin 40 milliGRAM(s) Oral at bedtime  heparin   Injectable 5000 Unit(s) SubCutaneous every 12 hours  levothyroxine 75 MICROGram(s) Oral daily  oseltamivir 30 milliGRAM(s) Oral two times a day  pantoprazole    Tablet 40 milliGRAM(s) Oral before breakfast  senna 2 Tablet(s) Oral at bedtime      TELEMETRY: 	    ECG:  	  RADIOLOGY:  OTHER: 	  	  LABS:	 	    CARDIAC MARKERS:                          12.4   4.81  )-----------( 221      ( 30 Dec 2023 07:05 )             39.4     12-30    136  |  102  |  15  ----------------------------<  114<H>  3.8   |  20<L>  |  0.68    Ca    8.5      30 Dec 2023 07:05  Mg     2.5     12-29    TPro  7.9  /  Alb  3.7  /  TBili  0.6  /  DBili  x   /  AST  18  /  ALT  9<L>  /  AlkPhos  104  12-29    proBNP:   Lipid Profile:   HgA1c:   TSH:     < from: Xray Chest 1 View- PORTABLE-Urgent (12.29.23 @ 08:53) >  IMPRESSION:  Exam is partially limited secondary to patient's head overlying bilateral   apices.  Bibasilar atelectasis.      - Would send GI PCR/cdiff if having diarrhea   - monitor stool for blood   - can potentially plan for colonoscopy on Tuesday if within patients [facility]     Assessment and plan  ---------------------------  88 year old female with hx of hypothyroidism, arthritis bilat hip replacements comes into the ED via EMS after unwitnessed fall this morning. Pt lives alone and states she woke up at 3am which is normal for her. Last thing she remembers is eating breakfast and then she remembers finding herself on the ground. She is not on any AC and does not remember having any dizziness, palpitations, chest pain or other symptoms prior to the fall. After waking up she reports having pain in the left lower extremity specifically in the left hip. She also reports she has been having a cough over the past few days. She denies any HA, neck pain, back pain, chest pain, change in vision, abd pain, n/v, urinary symptoms, sensory changes or motor weakness  syncope ?sec to dehydration/ influenza  iv hydration  ct scan noted ?colitis ?ischemia gi has been called  hypothyroid/  tsh  asa daily  TTE  started on Tamiflu  check orthostatic  dvt prophylaxis  oob to chair with assistant   GI evaluation noted, check stool for c diff

## 2023-12-31 NOTE — DIETITIAN INITIAL EVALUATION ADULT - PERTINENT MEDS FT
MEDICATIONS  (STANDING):  aspirin enteric coated 81 milliGRAM(s) Oral daily  atorvastatin 40 milliGRAM(s) Oral at bedtime  heparin   Injectable 5000 Unit(s) SubCutaneous every 12 hours  levothyroxine 75 MICROGram(s) Oral daily  oseltamivir 30 milliGRAM(s) Oral two times a day  pantoprazole    Tablet 40 milliGRAM(s) Oral before breakfast  senna 2 Tablet(s) Oral at bedtime    MEDICATIONS  (PRN):  HYDROmorphone  Injectable 0.2 milliGRAM(s) IV Push every 8 hours PRN Severe Pain (7 - 10)  oxyCODONE    IR 5 milliGRAM(s) Oral every 8 hours PRN Moderate Pain (4 - 6)

## 2023-12-31 NOTE — DIETITIAN INITIAL EVALUATION ADULT - ADD RECOMMEND
1. Continue no therapeutic dietary restrictions  2. Recommend adding Ensure plus high protein 2x/day to augment PO intakes for increased nutritional needs  3. Consider adding daily multivitamin and vitamin C supplements if no medical contraindications to aid in wound healing.   4. Encourage PO intakes. Honor food preferences as appropriate and available. Staff to provide assistance during meal times as warranted  5. Monitor PO intake, GI tolerance, skin integrity and labs. RD remains available if needed.

## 2023-12-31 NOTE — DISCHARGE NOTE PROVIDER - NSDCMRMEDTOKEN_GEN_ALL_CORE_FT
acetaminophen 325 mg oral tablet: 3 tab(s) orally every 8 hours  aspirin 81 mg oral delayed release tablet: 1 tab(s) orally once a day x 6 weeks total for dvt prevention  cranberry oral capsule:  orally once a day  cyanocobalamin 1000 mcg oral tablet: 2 tab(s) orally once a day  meclizine 25 mg oral tablet: 1 tab(s) orally 3 times a day, As Needed -for dizziness   oxyCODONE 5 mg oral tablet: 0.5-1 tab(s) orally every 4-6 hours, As needed, Moderate to Severe Pain  pantoprazole 40 mg oral delayed release tablet: 1 tab(s) orally once a day (before a meal)  senna oral tablet: 2 tab(s) orally once a day (at bedtime)  Synthroid 75 mcg (0.075 mg) oral tablet: 1 tab(s) orally once a day   3 in 1 Commode: ICD 10 Code Z72.3  acetaminophen 325 mg oral tablet: 3 tab(s) orally every 8 hours  aspirin 81 mg oral delayed release tablet: 1 tab(s) orally once a day x 6 weeks total for dvt prevention  cranberry oral capsule:  orally once a day  cyanocobalamin 1000 mcg oral tablet: 2 tab(s) orally once a day  meclizine 25 mg oral tablet: 1 tab(s) orally 3 times a day, As Needed -for dizziness   oxyCODONE 5 mg oral tablet: 0.5-1 tab(s) orally every 4-6 hours, As needed, Moderate to Severe Pain  pantoprazole 40 mg oral delayed release tablet: 1 tab(s) orally once a day (before a meal)  senna oral tablet: 2 tab(s) orally once a day (at bedtime)  Synthroid 75 mcg (0.075 mg) oral tablet: 1 tab(s) orally once a day   3 in 1 Commode: ICD 10 Code Z72.3  acetaminophen 325 mg oral tablet: 3 tab(s) orally every 8 hours  aspirin 81 mg oral delayed release tablet: 1 tab(s) orally once a day x 6 weeks total for dvt prevention  atorvastatin 40 mg oral tablet: 1 tab(s) orally once a day (at bedtime)  ciprofloxacin 250 mg oral tablet: 1 tab(s) orally 2 times a day  oxyCODONE 5 mg oral tablet: 0.5-1 tab(s) orally every 4-6 hours, As needed, Moderate to Severe Pain  pantoprazole 40 mg oral delayed release tablet: 1 tab(s) orally once a day (before a meal)  Rehab at Home: ICD 10 Code: Z72.3  senna oral tablet: 2 tab(s) orally once a day (at bedtime)  Synthroid 75 mcg (0.075 mg) oral tablet: 1 tab(s) orally once a day   3 in 1 Commode: ICD 10 Code Z72.3  acetaminophen 325 mg oral tablet: 3 tab(s) orally every 8 hours  aspirin 81 mg oral delayed release tablet: 1 tab(s) orally once a day x 6 weeks total for dvt prevention  atorvastatin 40 mg oral tablet: 1 tab(s) orally once a day (at bedtime)  ciprofloxacin 250 mg oral tablet: 1 tab(s) orally 2 times a day  Narcan 4 mg/0.1 mL nasal spray: 4 milligram(s) intranasally once as needed for opioid overdose  oxyCODONE 5 mg oral tablet: 0.5 tab(s) orally every 12 hours as needed for  moderate to severe pain MDD: 5 mg  pantoprazole 40 mg oral delayed release tablet: 1 tab(s) orally once a day (before a meal)  Rehab at Home: ICD 10 Code: Z72.3  senna oral tablet: 2 tab(s) orally once a day (at bedtime)  Synthroid 75 mcg (0.075 mg) oral tablet: 1 tab(s) orally once a day   acetaminophen 325 mg oral tablet: 3 tab(s) orally every 8 hours  aspirin 81 mg oral delayed release tablet: 1 tab(s) orally once a day x 6 weeks total for dvt prevention  atorvastatin 40 mg oral tablet: 1 tab(s) orally once a day (at bedtime)  ciprofloxacin 250 mg oral tablet: 1 tab(s) orally 2 times a day Last day 1/6/24  Narcan 4 mg/0.1 mL nasal spray: 4 milligram(s) intranasally once as needed for opioid overdose  oxyCODONE 5 mg oral tablet: 0.5 tab(s) orally every 12 hours as needed for  moderate to severe pain MDD: 5 mg  pantoprazole 40 mg oral delayed release tablet: 1 tab(s) orally once a day (before a meal)  senna oral tablet: 2 tab(s) orally once a day (at bedtime)  Synthroid 75 mcg (0.075 mg) oral tablet: 1 tab(s) orally once a day

## 2023-12-31 NOTE — PROGRESS NOTE ADULT - SUBJECTIVE AND OBJECTIVE BOX
date of service: 12-31-23 @ 09:49  [facility]  REVIEW OF SYSTEMS:  CONSTITUTIONAL: No fever,  no  weight loss  ENT:  No  tinnitus,   no   vertigo  NECK: No pain or stiffness  RESPIRATORY: No cough, wheezing, chills or hemoptysis;    No Shortness of Breath  CARDIOVASCULAR: No chest pain, palpitations, dizziness  GASTROINTESTINAL: No abdominal or epigastric pain. No nausea, vomiting, or hematemesis; No diarrhea  No melena or hematochezia.  GENITOURINARY: No dysuria, frequency, hematuria, or incontinence  NEUROLOGICAL: No headaches  SKIN: No itching,  no   rash  LYMPH Nodes: No enlarged glands  ENDOCRINE: No heat or cold intolerance  MUSCULOSKELETAL: No joint pain or swelling  PSYCHIATRIC: No depression, anxiety  HEME/LYMPH: No easy bruising, or bleeding gums  ALLERGY AND IMMUNOLOGIC: No hives or eczema	    MEDICATIONS  (STANDING):  aspirin enteric coated 81 milliGRAM(s) Oral daily  atorvastatin 40 milliGRAM(s) Oral at bedtime  heparin   Injectable 5000 Unit(s) SubCutaneous every 12 hours  levothyroxine 75 MICROGram(s) Oral daily  oseltamivir 30 milliGRAM(s) Oral two times a day  pantoprazole    Tablet 40 milliGRAM(s) Oral before breakfast  senna 2 Tablet(s) Oral at bedtime    MEDICATIONS  (PRN):  HYDROmorphone  Injectable 0.2 milliGRAM(s) IV Push every 8 hours PRN Severe Pain (7 - 10)  oxyCODONE    IR 5 milliGRAM(s) Oral every 8 hours PRN Moderate Pain (4 - 6)      Vital Signs Last 24 Hrs  T(C): 36.7 (31 Dec 2023 04:40), Max: 36.8 (30 Dec 2023 22:11)  T(F): 98 (31 Dec 2023 04:40), Max: 98.3 (30 Dec 2023 22:11)  HR: 84 (31 Dec 2023 04:40) (84 - 86)  BP: 127/70 (31 Dec 2023 04:40) (127/70 - 148/78)  BP(mean): --  RR: 18 (31 Dec 2023 04:40) (18 - 20)  SpO2: 95% (31 Dec 2023 04:40) (95% - 99%)    Parameters below as of 31 Dec 2023 04:40  Patient On (Oxygen Delivery Method): room air      CAPILLARY BLOOD GLUCOSE        I&O's Summary        Appearance: Normal	  HEENT:   Normal oral mucosa, PERRL, EOMI	  Lymphatic: No lymphadenopathy  Cardiovascular: Normal S1 S2, No JVD  Respiratory: Lungs clear to auscultation	  Gastrointestinal:  Soft, Non-tender, + BS	  Skin: No rash, No ecchymoses	  Extremities:     LABS:                        12.4   4.81  )-----------( 221      ( 30 Dec 2023 07:05 )             39.4     12-30    136  |  102  |  15  ----------------------------<  114<H>  3.8   |  20<L>  |  0.68    Ca    8.5      30 Dec 2023 07:05            Urinalysis Basic - ( 30 Dec 2023 07:28 )    Color: Dark Yellow / Appearance: Turbid / SG: >1.030 / pH: x  Gluc: x / Ketone: 40 mg/dL  / Bili: Negative / Urobili: 1.0 mg/dL   Blood: x / Protein: 30 mg/dL / Nitrite: Negative   Leuk Esterase: Negative / RBC: 16 /HPF / WBC 8 /HPF   Sq Epi: x / Non Sq Epi: 8 /HPF / Bacteria: Many /HPF                      Consultant(s) Notes Reviewed:      Care Discussed with Consultants/Other Providers:       date of service: 12-31-23 @ 09:49  [facility]  REVIEW OF SYSTEMS:  CONSTITUTIONAL: No fever,  no  weight loss  ENT:  No  tinnitus,   no   vertigo  NECK: No pain or stiffness  RESPIRATORY: No cough, wheezing, chills or hemoptysis;    No Shortness of Breath  CARDIOVASCULAR: No chest pain, palpitations, dizziness  GASTROINTESTINAL: No abdominal or epigastric pain. No nausea, vomiting, or hematemesis; No diarrhea  No melena or hematochezia.  GENITOURINARY: No dysuria, frequency, hematuria, or incontinence  NEUROLOGICAL: No headaches  SKIN: No itching,  no   rash  LYMPH Nodes: No enlarged glands  ENDOCRINE: No heat or cold intolerance  MUSCULOSKELETAL: No joint pain or swelling  PSYCHIATRIC: No depression, anxiety  HEME/LYMPH: No easy bruising, or bleeding gums  ALLERGY AND IMMUNOLOGIC: No hives or eczema	    MEDICATIONS  (STANDING):  aspirin enteric coated 81 milliGRAM(s) Oral daily  atorvastatin 40 milliGRAM(s) Oral at bedtime  heparin   Injectable 5000 Unit(s) SubCutaneous every 12 hours  levothyroxine 75 MICROGram(s) Oral daily  oseltamivir 30 milliGRAM(s) Oral two times a day  pantoprazole    Tablet 40 milliGRAM(s) Oral before breakfast  senna 2 Tablet(s) Oral at bedtime    MEDICATIONS  (PRN):  HYDROmorphone  Injectable 0.2 milliGRAM(s) IV Push every 8 hours PRN Severe Pain (7 - 10)  oxyCODONE    IR 5 milliGRAM(s) Oral every 8 hours PRN Moderate Pain (4 - 6)      Vital Signs Last 24 Hrs  T(C): 36.7 (31 Dec 2023 04:40), Max: 36.8 (30 Dec 2023 22:11)  T(F): 98 (31 Dec 2023 04:40), Max: 98.3 (30 Dec 2023 22:11)  HR: 84 (31 Dec 2023 04:40) (84 - 86)  BP: 127/70 (31 Dec 2023 04:40) (127/70 - 148/78)  BP(mean): --  RR: 18 (31 Dec 2023 04:40) (18 - 20)  SpO2: 95% (31 Dec 2023 04:40) (95% - 99%)    Parameters below as of 31 Dec 2023 04:40  Patient On (Oxygen Delivery Method): room air      CAPILLARY BLOOD GLUCOSE        I&O's Summary        Appearance: Normal	  HEENT:   Normal oral mucosa, PERRL, EOMI	  Lymphatic: No lymphadenopathy  Cardiovascular: Normal S1 S2, No JVD  Respiratory: Lungs clear to auscultation	  Gastrointestinal:  Soft, Non-tender, + BS	  Skin: No rash, No ecchymoses	  Extremities:     LABS:                        12.4   4.81  )-----------( 221      ( 30 Dec 2023 07:05 )             39.4     12-30    136  |  102  |  15  ----------------------------<  114<H>  3.8   |  20<L>  |  0.68    Ca    8.5      30 Dec 2023 07:05            Urinalysis Basic - ( 30 Dec 2023 07:28 )    Color: Dark Yellow / Appearance: Turbid / SG: >1.030 / pH: x  Gluc: x / Ketone: 40 mg/dL  / Bili: Negative / Urobili: 1.0 mg/dL   Blood: x / Protein: 30 mg/dL / Nitrite: Negative   Leuk Esterase: Negative / RBC: 16 /HPF / WBC 8 /HPF   Sq Epi: x / Non Sq Epi: 8 /HPF / Bacteria: Many /HPF                      Consultant(s) Notes Reviewed:      Care Discussed with Consultants/Other Providers:

## 2024-01-01 RX ORDER — HYDROMORPHONE HYDROCHLORIDE 2 MG/ML
0.2 INJECTION INTRAMUSCULAR; INTRAVENOUS; SUBCUTANEOUS ONCE
Refills: 0 | Status: DISCONTINUED | OUTPATIENT
Start: 2024-01-01 | End: 2024-01-01

## 2024-01-01 RX ADMIN — PANTOPRAZOLE SODIUM 40 MILLIGRAM(S): 20 TABLET, DELAYED RELEASE ORAL at 05:02

## 2024-01-01 RX ADMIN — OXYCODONE HYDROCHLORIDE 2.5 MILLIGRAM(S): 5 TABLET ORAL at 13:33

## 2024-01-01 RX ADMIN — HEPARIN SODIUM 5000 UNIT(S): 5000 INJECTION INTRAVENOUS; SUBCUTANEOUS at 17:26

## 2024-01-01 RX ADMIN — Medication 30 MILLIGRAM(S): at 17:26

## 2024-01-01 RX ADMIN — OXYCODONE HYDROCHLORIDE 2.5 MILLIGRAM(S): 5 TABLET ORAL at 22:30

## 2024-01-01 RX ADMIN — Medication 30 MILLIGRAM(S): at 05:36

## 2024-01-01 RX ADMIN — OXYCODONE HYDROCHLORIDE 2.5 MILLIGRAM(S): 5 TABLET ORAL at 21:32

## 2024-01-01 RX ADMIN — SENNA PLUS 2 TABLET(S): 8.6 TABLET ORAL at 21:32

## 2024-01-01 RX ADMIN — OXYCODONE HYDROCHLORIDE 2.5 MILLIGRAM(S): 5 TABLET ORAL at 06:23

## 2024-01-01 RX ADMIN — HYDROMORPHONE HYDROCHLORIDE 0.2 MILLIGRAM(S): 2 INJECTION INTRAMUSCULAR; INTRAVENOUS; SUBCUTANEOUS at 17:35

## 2024-01-01 RX ADMIN — ATORVASTATIN CALCIUM 40 MILLIGRAM(S): 80 TABLET, FILM COATED ORAL at 21:32

## 2024-01-01 RX ADMIN — Medication 81 MILLIGRAM(S): at 12:44

## 2024-01-01 RX ADMIN — OXYCODONE HYDROCHLORIDE 2.5 MILLIGRAM(S): 5 TABLET ORAL at 05:04

## 2024-01-01 RX ADMIN — MORPHINE SULFATE 4 MILLIGRAM(S): 50 CAPSULE, EXTENDED RELEASE ORAL at 08:00

## 2024-01-01 RX ADMIN — Medication 75 MICROGRAM(S): at 05:04

## 2024-01-01 RX ADMIN — Medication 1000 MILLIGRAM(S): at 10:40

## 2024-01-01 RX ADMIN — OXYCODONE HYDROCHLORIDE 2.5 MILLIGRAM(S): 5 TABLET ORAL at 13:36

## 2024-01-01 NOTE — PROGRESS NOTE ADULT - SUBJECTIVE AND OBJECTIVE BOX
date of service: 01-01-24 @ 11:13  [facility]  REVIEW OF SYSTEMS:  CONSTITUTIONAL: No fever,  no  weight loss  ENT:  No  tinnitus,   no   vertigo  NECK: No pain or stiffness  RESPIRATORY: No cough, wheezing, chills or hemoptysis;    No Shortness of Breath  CARDIOVASCULAR: No chest pain, palpitations, dizziness  GASTROINTESTINAL: No abdominal or epigastric pain. No nausea, vomiting, or hematemesis; No diarrhea  No melena or hematochezia.  GENITOURINARY: No dysuria, frequency, hematuria, or incontinence  NEUROLOGICAL: No headaches  SKIN: No itching,  no   rash  LYMPH Nodes: No enlarged glands  ENDOCRINE: No heat or cold intolerance  MUSCULOSKELETAL: No joint pain or swelling  PSYCHIATRIC: No depression, anxiety  HEME/LYMPH: No easy bruising, or bleeding gums  ALLERGY AND IMMUNOLOGIC: No hives or eczema	    MEDICATIONS  (STANDING):  ascorbic acid 500 milliGRAM(s) Oral daily  aspirin enteric coated 81 milliGRAM(s) Oral daily  atorvastatin 40 milliGRAM(s) Oral at bedtime  heparin   Injectable 5000 Unit(s) SubCutaneous every 12 hours  levothyroxine 75 MICROGram(s) Oral daily  multivitamin 1 Tablet(s) Oral daily  oseltamivir 30 milliGRAM(s) Oral two times a day  pantoprazole    Tablet 40 milliGRAM(s) Oral before breakfast  senna 2 Tablet(s) Oral at bedtime    MEDICATIONS  (PRN):  oxyCODONE    IR 2.5 milliGRAM(s) Oral every 8 hours PRN Moderate Pain (4 - 6)      Vital Signs Last 24 Hrs  T(C): 36.6 (01 Jan 2024 05:17), Max: 37.1 (31 Dec 2023 20:53)  T(F): 97.9 (01 Jan 2024 05:17), Max: 98.7 (31 Dec 2023 20:53)  HR: 76 (01 Jan 2024 05:17) (72 - 76)  BP: 103/59 (01 Jan 2024 05:17) (103/59 - 118/71)  BP(mean): --  RR: 18 (01 Jan 2024 05:17) (18 - 18)  SpO2: 93% (01 Jan 2024 05:17) (93% - 93%)    Parameters below as of 01 Jan 2024 05:17  Patient On (Oxygen Delivery Method): room air      CAPILLARY BLOOD GLUCOSE        I&O's Summary        Appearance: Normal	  HEENT:   Normal oral mucosa, PERRL, EOMI	  Lymphatic: No lymphadenopathy  Cardiovascular: Normal S1 S2, No JVD  Respiratory: Lungs clear to auscultation	  Gastrointestinal:  Soft, Non-tender, + BS	  Skin: No rash, No ecchymoses	  Extremities:     LABS:                          Thyroid Stimulating Hormone, Serum: 2.72 uIU/mL (12-31 @ 07:27)          Consultant(s) Notes Reviewed:      Care Discussed with Consultants/Other Providers:       date of service: 01-01-24 @ 11:13  [facility]  REVIEW OF SYSTEMS:  CONSTITUTIONAL: No fever,  no  weight loss  ENT:  No  tinnitus,   no   vertigo  NECK: No pain or stiffness  RESPIRATORY: No cough, wheezing, chills or hemoptysis;    No Shortness of Breath  CARDIOVASCULAR: No chest pain, palpitations, dizziness  GASTROINTESTINAL: No abdominal or epigastric pain. No nausea, vomiting, or hematemesis; No diarrhea  No melena or hematochezia.  GENITOURINARY: No dysuria, frequency, hematuria, or incontinence  NEUROLOGICAL: No headaches  SKIN: No itching,  no   rash  LYMPH Nodes: No enlarged glands  ENDOCRINE: No heat or cold intolerance  MUSCULOSKELETAL: No joint pain or swelling  PSYCHIATRIC: No depression, anxiety  HEME/LYMPH: No easy bruising, or bleeding gums  ALLERGY AND IMMUNOLOGIC: No hives or eczema	    MEDICATIONS  (STANDING):  ascorbic acid 500 milliGRAM(s) Oral daily  aspirin enteric coated 81 milliGRAM(s) Oral daily  atorvastatin 40 milliGRAM(s) Oral at bedtime  heparin   Injectable 5000 Unit(s) SubCutaneous every 12 hours  levothyroxine 75 MICROGram(s) Oral daily  multivitamin 1 Tablet(s) Oral daily  oseltamivir 30 milliGRAM(s) Oral two times a day  pantoprazole    Tablet 40 milliGRAM(s) Oral before breakfast  senna 2 Tablet(s) Oral at bedtime    MEDICATIONS  (PRN):  oxyCODONE    IR 2.5 milliGRAM(s) Oral every 8 hours PRN Moderate Pain (4 - 6)      Vital Signs Last 24 Hrs  T(C): 36.6 (01 Jan 2024 05:17), Max: 37.1 (31 Dec 2023 20:53)  T(F): 97.9 (01 Jan 2024 05:17), Max: 98.7 (31 Dec 2023 20:53)  HR: 76 (01 Jan 2024 05:17) (72 - 76)  BP: 103/59 (01 Jan 2024 05:17) (103/59 - 118/71)  BP(mean): --  RR: 18 (01 Jan 2024 05:17) (18 - 18)  SpO2: 93% (01 Jan 2024 05:17) (93% - 93%)    Parameters below as of 01 Jan 2024 05:17  Patient On (Oxygen Delivery Method): room air      CAPILLARY BLOOD GLUCOSE        I&O's Summary        Appearance: Normal	  HEENT:   Normal oral mucosa, PERRL, EOMI	  Lymphatic: No lymphadenopathy  Cardiovascular: Normal S1 S2, No JVD  Respiratory: Lungs clear to auscultation	  Gastrointestinal:  Soft, Non-tender, + BS	  Skin: No rash, No ecchymoses	  Extremities:     LABS:                          Thyroid Stimulating Hormone, Serum: 2.72 uIU/mL (12-31 @ 07:27)          Consultant(s) Notes Reviewed:      Care Discussed with Consultants/Other Providers:

## 2024-01-01 NOTE — PROGRESS NOTE ADULT - ASSESSMENT
88 year old female      h/o  hypothyroidism, arthritis bilat hip replacements /  olecranon fx/ prior h/o  dizziness      arrives  via EMS after unwitnessed fall this morning.       pt  lives alone /  woke up at 3am which is normal for her. . was eating breakfast and then she remembers finding herself on the ground.     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  fistula/ ?  mass     on dvt ppx /   fall risk     ct  head. 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  mri   dept  made  aware  by  np    per house  gi.  will  need  colonoscopy      and  per  hcp,  does  not   want  any  intervention / testing / and  is  aware  that  pt  may  have  a  malignant   process     uti,  GNR.  on iv rocephin.  follow  final report   per  PT ,  rehab was  recommended/  hcp  wants  home      hcp  wants  home  as dispo    lives  alone. has  no children/   and is  full  code    dallas Churchill  is  hcp/          ad< from: CT 3D Reconstruct w/ Workstation (12.29.23 @ 11:11) >  IMPRESSION:  1.  No fractures are seen.  2.  Long segment of mild to moderate wall thickening involves the upper   sigmoid colon with a soft tissue tract extending to the mid sigmoid   colon. These changes could reflect scarring fromprior inflammation and a   fistulous tract. Neoplasm cannot be absolutely excluded.  3.  Correlation with other prior imaging or prior colonoscopy is   suggested if available. Follow-up CT of the abdomen and pelvis with oral   and/or rectal contrast may be helpful to assess for a fistula. Consider   colonoscopy as warranted.  --- End of Report ---      rad< from: CT Head No Cont (12.29.23 @ 11:12) >  IMPRESSION:  1. 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 No evidence of acute intracranial injury.  --- End of Report ---            <  88 year old female      h/o  hypothyroidism, arthritis bilat hip replacements /  olecranon fx/ prior h/o  dizziness      arrives  via EMS after unwitnessed fall this morning.       pt  lives alone /  woke up at 3am which is normal for her. . was eating breakfast and then she remembers finding herself on the ground.     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  fistula/ ?  mass     on dvt ppx /   fall risk     ct  head. 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  mri   dept  made  aware  by  np    per house  gi.  will  need  colonoscopy    and  per  hcp,  does  not   want  any  intervention / testing / and  is  aware  that  pt  may  have  a  malignant   process     uti,  GNR.  on iv rocephin.  follow  final report     mri head  pending/  neuro  d r pishanidar  per  PT,   rehab was  recommended/  hcp  wants  home      hcp  wants  home  as dispo    lives  alone. has  no children/   and is  full  code    dallas Churchill  is  hcp/          ad< from: CT 3D Reconstruct w/ Workstation (12.29.23 @ 11:11) >  IMPRESSION:  1.  No fractures are seen.  2.  Long segment of mild to moderate wall thickening involves the upper   sigmoid colon with a soft tissue tract extending to the mid sigmoid   colon. These changes could reflect scarring fromprior inflammation and a   fistulous tract. Neoplasm cannot be absolutely excluded.  3.  Correlation with other prior imaging or prior colonoscopy is   suggested if available. Follow-up CT of the abdomen and pelvis with oral   and/or rectal contrast may be helpful to assess for a fistula. Consider   colonoscopy as warranted.  --- End of Report ---      rad< from: CT Head No Cont (12.29.23 @ 11:12) >  IMPRESSION:  1. 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 No evidence of acute intracranial injury.  --- End of Report ---            <

## 2024-01-01 NOTE — PROGRESS NOTE ADULT - SUBJECTIVE AND OBJECTIVE BOX
Date of Service: 01-01-24 @ 12:17           CARDIOLOGY     PROGRESS  NOTE   ________________________________________________    CHIEF COMPLAINT:Patient is a 88y old  Female who presents with a chief complaint of fall/ syncope (01 Jan 2024 11:13)  no complain  	  REVIEW OF SYSTEMS:  CONSTITUTIONAL: No fever, weight loss, or fatigue  EYES: No eye pain, visual disturbances, or discharge  ENT:  No difficulty hearing, tinnitus, vertigo; No sinus or throat pain  NECK: No pain or stiffness  RESPIRATORY: No cough, wheezing, chills or hemoptysis; No Shortness of Breath  CARDIOVASCULAR: No chest pain, palpitations, passing out, dizziness, or leg swelling  GASTROINTESTINAL: No abdominal or epigastric pain. No nausea, vomiting, or hematemesis; No diarrhea or constipation. No melena or hematochezia.  GENITOURINARY: No dysuria, frequency, hematuria, or incontinence  NEUROLOGICAL: No headaches, memory loss, loss of strength, numbness, or tremors  SKIN: No itching, burning, rashes, or lesions   LYMPH Nodes: No enlarged glands  ENDOCRINE: No heat or cold intolerance; No hair loss  MUSCULOSKELETAL: No joint pain or swelling; No muscle, back, or extremity pain  PSYCHIATRIC: No depression, anxiety, mood swings, or difficulty sleeping  HEME/LYMPH: No easy bruising, or bleeding gums  ALLERGY AND IMMUNOLOGIC: No hives or eczema	    [x ] All others negative	  [ ] Unable to obtain    PHYSICAL EXAM:  T(C): 36.6 (01-01-24 @ 05:17), Max: 37.1 (12-31-23 @ 20:53)  HR: 76 (01-01-24 @ 05:17) (72 - 76)  BP: 103/59 (01-01-24 @ 05:17) (103/59 - 118/71)  RR: 18 (01-01-24 @ 05:17) (18 - 18)  SpO2: 93% (01-01-24 @ 05:17) (93% - 93%)  Wt(kg): --  I&O's Summary      Appearance: Normal	  HEENT:   Normal oral mucosa, PERRL, EOMI	  Lymphatic: No lymphadenopathy  Cardiovascular: Normal S1 S2, No JVD, + murmurs, No edema  Respiratory: rhonchi  Psychiatry: A & O x 3, Mood & affect appropriate  Gastrointestinal:  Soft, Non-tender, + BS	  Skin: No rashes, No ecchymoses, No cyanosis	  Extremities: Normal range of motion, No clubbing, cyanosis or edema  Vascular: Peripheral pulses palpable 2+ bilaterally    MEDICATIONS  (STANDING):  aspirin enteric coated 81 milliGRAM(s) Oral daily  atorvastatin 40 milliGRAM(s) Oral at bedtime  heparin   Injectable 5000 Unit(s) SubCutaneous every 12 hours  levothyroxine 75 MICROGram(s) Oral daily  oseltamivir 30 milliGRAM(s) Oral two times a day  pantoprazole    Tablet 40 milliGRAM(s) Oral before breakfast  senna 2 Tablet(s) Oral at bedtime      TELEMETRY: 	    ECG:  	  RADIOLOGY:  OTHER: 	  	  LABS:	 	    CARDIAC MARKERS:    proBNP:   Lipid Profile:   HgA1c:   TSH: Thyroid Stimulating Hormone, Serum: 2.72 uIU/mL (12-31 @ 07:27)          Assessment and plan  ---------------------------  88 year old female with hx of hypothyroidism, arthritis bilat hip replacements comes into the ED via EMS after unwitnessed fall this morning. Pt lives alone and states she woke up at 3am which is normal for her. Last thing she remembers is eating breakfast and then she remembers finding herself on the ground. She is not on any AC and does not remember having any dizziness, palpitations, chest pain or other symptoms prior to the fall. After waking up she reports having pain in the left lower extremity specifically in the left hip. She also reports she has been having a cough over the past few days. She denies any HA, neck pain, back pain, chest pain, change in vision, abd pain, n/v, urinary symptoms, sensory changes or motor weakness  syncope ?sec to dehydration/ influenza  iv hydration  ct scan noted ?colitis ?ischemia gi has been called  hypothyroid/  tsh  asa daily  TTE  started on Tamiflu  check orthostatic  dvt prophylaxis  oob to chair with assistant   GI evaluation noted, check stool for c diff  bp and hr is well contrlled

## 2024-01-01 NOTE — CONSULT NOTE ADULT - SUBJECTIVE AND OBJECTIVE BOX
Neurology Consult    Reason for Consult: Patient is a 88y old  Female who presents with a chief complaint of fall/ syncope (01 Jan 2024 12:17)      HPI:  88 year old female with hypothyroidism, arthritis bilat hip replacements comes into the ED via EMS after unwitnessed fall. Pt lives alone and states she woke up at 3am which is normal for her. Last thing she remembers is eating breakfast and then she remembers finding herself on the ground. She is not on any AC and does not remember having any dizziness, palpitations, chest pain or other symptoms prior to the fall. After waking up she reports having pain in the left lower extremity specifically in the left hip. She also reports she has been having a cough over the past few days. She denies any HA, neck pain, back pain, chest pain, change in vision, abd pain, n/v, urinary symptoms, sensory changes or motor weakness.       PAST MEDICAL & SURGICAL HISTORY:  Hypothyroid      Arthritis      Stomach ulcer      Hernia, hiatal      Spondylitis      Former smoker, stopped smoking many years ago      Hypothyroidism, unspecified type      History of bilateral hip replacements      Status post left knee replacement      No significant past surgical history          Allergies: Allergies    vancomycin (Blisters)    Intolerances        Social History: Denies toxic habits including tobacco, ETOH or illicit drugs.    Family History: FAMILY HISTORY:  No pertinent family history in first degree relatives    . No family history of strokes    Medications: MEDICATIONS  (STANDING):  aspirin enteric coated 81 milliGRAM(s) Oral daily  atorvastatin 40 milliGRAM(s) Oral at bedtime  heparin   Injectable 5000 Unit(s) SubCutaneous every 12 hours  HYDROmorphone  Injectable 0.2 milliGRAM(s) IV Push once  levothyroxine 75 MICROGram(s) Oral daily  oseltamivir 30 milliGRAM(s) Oral two times a day  pantoprazole    Tablet 40 milliGRAM(s) Oral before breakfast  senna 2 Tablet(s) Oral at bedtime    MEDICATIONS  (PRN):  oxyCODONE    IR 2.5 milliGRAM(s) Oral every 8 hours PRN Moderate Pain (4 - 6)      Review of Systems: limited given mental status   CONSTITUTIONAL:  No weight loss, fever, chills, weakness or fatigue.  HEENT:  Eyes:  No visual loss, blurred vision, double vision or yellow sclera. Ears, Nose, Throat:  No hearing loss, sneezing, congestion, runny nose or sore throat.  SKIN:  No rash or itching.  CARDIOVASCULAR:  No chest pain, chest pressure or chest discomfort. No palpitations or edema.  RESPIRATORY:  No shortness of breath, cough or sputum.  GASTROINTESTINAL:  No anorexia, nausea, vomiting or diarrhea. No abdominal pain or blood.  GENITOURINARY:  No burning on urination or incontinence   NEUROLOGICAL:  No headache, dizziness, syncope, paralysis, ataxia, numbness or tingling in the extremities. No change in bowel or bladder control. no limb weakness. no vision changes.   MUSCULOSKELETAL:  No muscle, back pain, joint pain or stiffness.  HEMATOLOGIC:  No anemia, bleeding or bruising.  LYMPHATICS:  No enlarged nodes. No history of splenectomy.  PSYCHIATRIC:  No history of depression or anxiety.  ENDOCRINOLOGIC:  No reports of sweating, cold or heat intolerance. No polyuria or polydipsia.      Vitals:  Vital Signs Last 24 Hrs  T(C): 36.9 (01 Jan 2024 13:42), Max: 37.1 (31 Dec 2023 20:53)  T(F): 98.5 (01 Jan 2024 13:42), Max: 98.7 (31 Dec 2023 20:53)  HR: 64 (01 Jan 2024 13:42) (64 - 76)  BP: 117/73 (01 Jan 2024 13:42) (103/59 - 118/71)  BP(mean): --  RR: 18 (01 Jan 2024 13:42) (18 - 18)  SpO2: 93% (01 Jan 2024 13:42) (93% - 93%)    Parameters below as of 01 Jan 2024 13:42  Patient On (Oxygen Delivery Method): room air        General Exam:   General Appearance: Appropriately dressed and in no acute distress       Head: Normocephalic, atraumatic and no dysmorphic features  Ear, Nose, and Throat: Moist mucous membranes  CVS: S1S2+  Resp: No SOB, no wheeze or rhonchi  GI: soft NT/ND  Extremities: No edema or cyanosis  Skin: No bruises or rashes     Neurological Exam:  Mental Status: Awake, alert and oriented x 1.  Able to follow simple verbal commands. Able to name and repeat. fluent speech. No obvious aphasia or dysarthria noted.   Cranial Nerves: PERRL, EOMI, VFFC, sensation V1-V3 intact,  no obvious facial asymmetry, equal elevation of palate, scm/trap 5/5, tongue is midline on protrusion. no obvious papilledema on fundoscopic exam. very hard of hearing   Motor:  LOWE at least 4/5 throuhgout   Sensation: Intact to light touch and pinprick throughout. no right/left confusion. no extinction to tactile on DSS. Romberg was negative.   Reflexes: 1+ throughout at biceps, brachioradialis, triceps, patellars and ankles bilaterally and equal. No clonus. R toe and L toe were both downgoing.  Coordination: No dysmetria on FNF    Gait: deferred     Data/Labs/Imaging which I personally reviewed.     Labs:               < from: CT Head No Cont (12.29.23 @ 11:12) >    ACC: [id number] EXAM:  CT BRAIN   ORDERED BY:  ROQUE CALABRESE     PROCEDURE DATE:  12/29/2023          INTERPRETATION:  CT head without IV contrast    CLINICAL INFORMATION:     Syncope and fall unwitnessed  MCT     INTRACRANIAL HEMORRHAGE.  INJURY.   TRAUMA.  ENCOUNTER / stage of care for trauma patient:     Initial    TECHNIQUE:  Contiguous axial 3 mm thick images were acquired.  This data   set was reconstructed in the sagittal and coronal planes.  Contrast was   not administered for this examination.  Note:   Artifact from gross   patient motion causes image blurring and limits evaluation.  Images were   repeated because of patient motion.  CONTRAST:    None  DOSE INFORMATION:   This scan was performed using automatic exposure   control (radiation dose reduction software) to obtain a diagnostic image   quality scan with patient dose as low as reasonably achievable.   Total   DLP for this examination is estimated at 623 mGy*cm.    COMPARISON:   CT head 2021 available for review.    FINDINGS:    BRAIN:    The brain  demonstrates a small focal lesion of remote deep   infarction at the left caudate nucleus head neck junction region. No   cerebral cortical lesion has developed.   Cerebral cortical gray-white   matter differentiation is maintained.  No acute cerebral cortical infarct   is seen.  No intracranial hemorrhage is found.  No mass effect is found   in the brain.    CSF SPACES:  The ventricles, sulci and basal cisterns appear appear   mildly dilated reflecting diffuse brain volume loss. dilated reflecting   diffuse brain volume loss.   No differential cerebral cortical atrophy is   recognized.  However, there is differential dilatation of the temporal   horns of the lateral ventricles associated with differential hippocampal   formation atrophy.    VESSELS: Intracranial vasculature is also significant for atherosclerotic   calcifications within the cavernous segments of the internal carotid   arteries.    HEAD AND NECK STRUCTURES:  The orbits are unremarkable.  The included   paranasal sinuses  demonstrate mucosal opacification of the right   sphenoid sinus. 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  The nasopharynx is symmetric.    The central skull base is intact.  The temporal bones demonstrate patent   petrous air cells.  The calvarium appears unremarkable.      IMPRESSION:    1. 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 No evidence of acute intracranial injury.    --- End of Report ---            EDUARDO KINSEY MD; Attending Radiologist  This document has been electronically signed. Dec 29 2023 11:39AM    < end of copied text >           Neurology Consult    Reason for Consult: Patient is a 88y old  Female who presents with a chief complaint of fall/ syncope (01 Jan 2024 12:17)      HPI:  88 year old female with hypothyroidism, arthritis bilat hip replacements comes into the ED via EMS after unwitnessed fall. Pt lives alone and states she woke up at 3am which is normal for her. Last thing she remembers is eating breakfast and then she remembers finding herself on the ground. She is not on any AC and does not remember having any dizziness, palpitations, chest pain or other symptoms prior to the fall. After waking up she reports having pain in the left lower extremity specifically in the left hip. She also reports she has been having a cough over the past few days. She denies any HA, neck pain, back pain, chest pain, change in vision, abd pain, n/v, urinary symptoms, sensory changes or motor weakness.       PAST MEDICAL & SURGICAL HISTORY:  Hypothyroid      Arthritis      Stomach ulcer      Hernia, hiatal      Spondylitis      Former smoker, stopped smoking many years ago      Hypothyroidism, unspecified type      History of bilateral hip replacements      Status post left knee replacement      No significant past surgical history          Allergies: Allergies    vancomycin (Blisters)    Intolerances        Social History: Denies toxic habits including tobacco, ETOH or illicit drugs.    Family History: FAMILY HISTORY:  No pertinent family history in first degree relatives    . No family history of strokes    Medications: MEDICATIONS  (STANDING):  aspirin enteric coated 81 milliGRAM(s) Oral daily  atorvastatin 40 milliGRAM(s) Oral at bedtime  heparin   Injectable 5000 Unit(s) SubCutaneous every 12 hours  HYDROmorphone  Injectable 0.2 milliGRAM(s) IV Push once  levothyroxine 75 MICROGram(s) Oral daily  oseltamivir 30 milliGRAM(s) Oral two times a day  pantoprazole    Tablet 40 milliGRAM(s) Oral before breakfast  senna 2 Tablet(s) Oral at bedtime    MEDICATIONS  (PRN):  oxyCODONE    IR 2.5 milliGRAM(s) Oral every 8 hours PRN Moderate Pain (4 - 6)      Review of Systems: limited given mental status   CONSTITUTIONAL:  No weight loss, fever, chills, weakness or fatigue.  HEENT:  Eyes:  No visual loss, blurred vision, double vision or yellow sclera. Ears, Nose, Throat:  No hearing loss, sneezing, congestion, runny nose or sore throat.  SKIN:  No rash or itching.  CARDIOVASCULAR:  No chest pain, chest pressure or chest discomfort. No palpitations or edema.  RESPIRATORY:  No shortness of breath, cough or sputum.  GASTROINTESTINAL:  No anorexia, nausea, vomiting or diarrhea. No abdominal pain or blood.  GENITOURINARY:  No burning on urination or incontinence   NEUROLOGICAL:  No headache, dizziness, syncope, paralysis, ataxia, numbness or tingling in the extremities. No change in bowel or bladder control. no limb weakness. no vision changes.   MUSCULOSKELETAL:  No muscle, back pain, joint pain or stiffness.  HEMATOLOGIC:  No anemia, bleeding or bruising.  LYMPHATICS:  No enlarged nodes. No history of splenectomy.  PSYCHIATRIC:  No history of depression or anxiety.  ENDOCRINOLOGIC:  No reports of sweating, cold or heat intolerance. No polyuria or polydipsia.      Vitals:  Vital Signs Last 24 Hrs  T(C): 36.9 (01 Jan 2024 13:42), Max: 37.1 (31 Dec 2023 20:53)  T(F): 98.5 (01 Jan 2024 13:42), Max: 98.7 (31 Dec 2023 20:53)  HR: 64 (01 Jan 2024 13:42) (64 - 76)  BP: 117/73 (01 Jan 2024 13:42) (103/59 - 118/71)  BP(mean): --  RR: 18 (01 Jan 2024 13:42) (18 - 18)  SpO2: 93% (01 Jan 2024 13:42) (93% - 93%)    Parameters below as of 01 Jan 2024 13:42  Patient On (Oxygen Delivery Method): room air        General Exam:   General Appearance: Appropriately dressed and in no acute distress       Head: Normocephalic, atraumatic and no dysmorphic features  Ear, Nose, and Throat: Moist mucous membranes  CVS: S1S2+  Resp: No SOB, no wheeze or rhonchi  GI: soft NT/ND  Extremities: No edema or cyanosis  Skin: No bruises or rashes     Neurological Exam:  Mental Status: Awake, alert and oriented x 1.  Able to follow simple verbal commands. Able to name and repeat. fluent speech. No obvious aphasia or dysarthria noted.   Cranial Nerves: PERRL, EOMI, VFFC, sensation V1-V3 intact,  no obvious facial asymmetry, equal elevation of palate, scm/trap 5/5, tongue is midline on protrusion. no obvious papilledema on fundoscopic exam. very hard of hearing   Motor:  LOWE at least 4/5 throuhgout   Sensation: Intact to light touch and pinprick throughout. no right/left confusion. no extinction to tactile on DSS. Romberg was negative.   Reflexes: 1+ throughout at biceps, brachioradialis, triceps, patellars and ankles bilaterally and equal. No clonus. R toe and L toe were both downgoing.  Coordination: No dysmetria on FNF    Gait: deferred     Data/Labs/Imaging which I personally reviewed.     Labs:               < from: CT Head No Cont (12.29.23 @ 11:12) >    ACC: [id number] EXAM:  CT BRAIN   ORDERED BY:  ROQUE CALABRESE     PROCEDURE DATE:  12/29/2023          INTERPRETATION:  CT head without IV contrast    CLINICAL INFORMATION:     Syncope and fall unwitnessed  MCT     INTRACRANIAL HEMORRHAGE.  INJURY.   TRAUMA.  ENCOUNTER / stage of care for trauma patient:     Initial    TECHNIQUE:  Contiguous axial 3 mm thick images were acquired.  This data   set was reconstructed in the sagittal and coronal planes.  Contrast was   not administered for this examination.  Note:   Artifact from gross   patient motion causes image blurring and limits evaluation.  Images were   repeated because of patient motion.  CONTRAST:    None  DOSE INFORMATION:   This scan was performed using automatic exposure   control (radiation dose reduction software) to obtain a diagnostic image   quality scan with patient dose as low as reasonably achievable.   Total   DLP for this examination is estimated at 623 mGy*cm.    COMPARISON:   CT head 2021 available for review.    FINDINGS:    BRAIN:    The brain  demonstrates a small focal lesion of remote deep   infarction at the left caudate nucleus head neck junction region. No   cerebral cortical lesion has developed.   Cerebral cortical gray-white   matter differentiation is maintained.  No acute cerebral cortical infarct   is seen.  No intracranial hemorrhage is found.  No mass effect is found   in the brain.    CSF SPACES:  The ventricles, sulci and basal cisterns appear appear   mildly dilated reflecting diffuse brain volume loss. dilated reflecting   diffuse brain volume loss.   No differential cerebral cortical atrophy is   recognized.  However, there is differential dilatation of the temporal   horns of the lateral ventricles associated with differential hippocampal   formation atrophy.    VESSELS: Intracranial vasculature is also significant for atherosclerotic   calcifications within the cavernous segments of the internal carotid   arteries.    HEAD AND NECK STRUCTURES:  The orbits are unremarkable.  The included   paranasal sinuses  demonstrate mucosal opacification of the right   sphenoid sinus. 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  The nasopharynx is symmetric.    The central skull base is intact.  The temporal bones demonstrate patent   petrous air cells.  The calvarium appears unremarkable.      IMPRESSION:    1. 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 No evidence of acute intracranial injury.    --- End of Report ---            EDUARDO KINSEY MD; Attending Radiologist  This document has been electronically signed. Dec 29 2023 11:39AM    < end of copied text >

## 2024-01-01 NOTE — CONSULT NOTE ADULT - ASSESSMENT
88 year old female with hypothyroidism, arthritis bilat hip replacements comes into the ED via EMS after unwitnessed fall.    CTH old L caudate infarct /BG infarct   UA+   Ucx --> GNR   + flu   TSH WNL o/e very hard of hearaing but OLWE =    Impression:   1) unwittnessed fall/syncope in setting of flu and UTI  2) AMS likely toxic metabolic in setting of infection  3) chronic L caudate small vessel stroke     - for secondary stroke prevention c/w asa 81mg daily and statin therapy with LDL goal < 70mg/dL   - getting tamifluy  - f/u urine cx   - MRI brain ordered, will f/u.    - TTE pending   - check carotid duplex    - telemetry  - PT/OT/SS/SLP, OOBC  - check FS, glucose control <180  - GI/DVT ppx  - Counseling on diet, exercise, and medication adherence was done  - Counseling on smoking cessation and alcohol consumption offered when appropriate.  - Pain assessed and judicious use of narcotics when appropriate was discussed.    - Stroke education given when appropriate.  - Importance of fall prevention discussed.   - Differential diagnosis and plan of care discussed with patient and/or family and primary team  - Thank you for allowing me to participate in the care of this patient. Call with questions.     Yasmany Alvarez MD  Vascular Neurology  Office: [phone]  88 year old female with hypothyroidism, arthritis bilat hip replacements comes into the ED via EMS after unwitnessed fall.    CTH old L caudate infarct /BG infarct   UA+   Ucx --> GNR   + flu   TSH WNL o/e very hard of hearaing but LOWE =    Impression:   1) unwittnessed fall/syncope in setting of flu and UTI  2) AMS likely toxic metabolic in setting of infection  3) chronic L caudate small vessel stroke     - for secondary stroke prevention c/w asa 81mg daily and statin therapy with LDL goal < 70mg/dL   - getting tamifluy  - f/u urine cx   - MRI brain ordered, will f/u.    - TTE pending   - check carotid duplex    - telemetry  - PT/OT/SS/SLP, OOBC  - check FS, glucose control <180  - GI/DVT ppx  - Counseling on diet, exercise, and medication adherence was done  - Counseling on smoking cessation and alcohol consumption offered when appropriate.  - Pain assessed and judicious use of narcotics when appropriate was discussed.    - Stroke education given when appropriate.  - Importance of fall prevention discussed.   - Differential diagnosis and plan of care discussed with patient and/or family and primary team  - Thank you for allowing me to participate in the care of this patient. Call with questions.     Yasmany Alvarez MD  Vascular Neurology  Office: [phone]

## 2024-01-02 LAB
-  AMOXICILLIN/CLAVULANIC ACID: SIGNIFICANT CHANGE UP
-  AMOXICILLIN/CLAVULANIC ACID: SIGNIFICANT CHANGE UP
-  AMPICILLIN/SULBACTAM: SIGNIFICANT CHANGE UP
-  AMPICILLIN/SULBACTAM: SIGNIFICANT CHANGE UP
-  AMPICILLIN: SIGNIFICANT CHANGE UP
-  AMPICILLIN: SIGNIFICANT CHANGE UP
-  AZTREONAM: SIGNIFICANT CHANGE UP
-  AZTREONAM: SIGNIFICANT CHANGE UP
-  CEFAZOLIN: SIGNIFICANT CHANGE UP
-  CEFAZOLIN: SIGNIFICANT CHANGE UP
-  CEFEPIME: SIGNIFICANT CHANGE UP
-  CEFEPIME: SIGNIFICANT CHANGE UP
-  CEFOXITIN: SIGNIFICANT CHANGE UP
-  CEFOXITIN: SIGNIFICANT CHANGE UP
-  CEFTRIAXONE: SIGNIFICANT CHANGE UP
-  CEFTRIAXONE: SIGNIFICANT CHANGE UP
-  CEFUROXIME: SIGNIFICANT CHANGE UP
-  CEFUROXIME: SIGNIFICANT CHANGE UP
-  CIPROFLOXACIN: SIGNIFICANT CHANGE UP
-  CIPROFLOXACIN: SIGNIFICANT CHANGE UP
-  ERTAPENEM: SIGNIFICANT CHANGE UP
-  ERTAPENEM: SIGNIFICANT CHANGE UP
-  GENTAMICIN: SIGNIFICANT CHANGE UP
-  GENTAMICIN: SIGNIFICANT CHANGE UP
-  LEVOFLOXACIN: SIGNIFICANT CHANGE UP
-  LEVOFLOXACIN: SIGNIFICANT CHANGE UP
-  MEROPENEM: SIGNIFICANT CHANGE UP
-  MEROPENEM: SIGNIFICANT CHANGE UP
-  NITROFURANTOIN: SIGNIFICANT CHANGE UP
-  NITROFURANTOIN: SIGNIFICANT CHANGE UP
-  PIPERACILLIN/TAZOBACTAM: SIGNIFICANT CHANGE UP
-  PIPERACILLIN/TAZOBACTAM: SIGNIFICANT CHANGE UP
-  TOBRAMYCIN: SIGNIFICANT CHANGE UP
-  TOBRAMYCIN: SIGNIFICANT CHANGE UP
-  TRIMETHOPRIM/SULFAMETHOXAZOLE: SIGNIFICANT CHANGE UP
-  TRIMETHOPRIM/SULFAMETHOXAZOLE: SIGNIFICANT CHANGE UP
CULTURE RESULTS: ABNORMAL
CULTURE RESULTS: ABNORMAL
METHOD TYPE: SIGNIFICANT CHANGE UP
METHOD TYPE: SIGNIFICANT CHANGE UP
ORGANISM # SPEC MICROSCOPIC CNT: ABNORMAL
SPECIMEN SOURCE: SIGNIFICANT CHANGE UP
SPECIMEN SOURCE: SIGNIFICANT CHANGE UP

## 2024-01-02 RX ORDER — ACETAMINOPHEN 500 MG
650 TABLET ORAL ONCE
Refills: 0 | Status: COMPLETED | OUTPATIENT
Start: 2024-01-02 | End: 2024-01-02

## 2024-01-02 RX ADMIN — OXYCODONE HYDROCHLORIDE 2.5 MILLIGRAM(S): 5 TABLET ORAL at 06:55

## 2024-01-02 RX ADMIN — PANTOPRAZOLE SODIUM 40 MILLIGRAM(S): 20 TABLET, DELAYED RELEASE ORAL at 06:11

## 2024-01-02 RX ADMIN — OXYCODONE HYDROCHLORIDE 2.5 MILLIGRAM(S): 5 TABLET ORAL at 18:20

## 2024-01-02 RX ADMIN — Medication 650 MILLIGRAM(S): at 21:57

## 2024-01-02 RX ADMIN — Medication 650 MILLIGRAM(S): at 22:25

## 2024-01-02 RX ADMIN — Medication 81 MILLIGRAM(S): at 17:05

## 2024-01-02 RX ADMIN — Medication 30 MILLIGRAM(S): at 17:04

## 2024-01-02 RX ADMIN — OXYCODONE HYDROCHLORIDE 2.5 MILLIGRAM(S): 5 TABLET ORAL at 06:10

## 2024-01-02 RX ADMIN — Medication 75 MICROGRAM(S): at 06:11

## 2024-01-02 RX ADMIN — Medication 30 MILLIGRAM(S): at 06:11

## 2024-01-02 RX ADMIN — ATORVASTATIN CALCIUM 40 MILLIGRAM(S): 80 TABLET, FILM COATED ORAL at 21:57

## 2024-01-02 RX ADMIN — HEPARIN SODIUM 5000 UNIT(S): 5000 INJECTION INTRAVENOUS; SUBCUTANEOUS at 17:05

## 2024-01-02 NOTE — PROGRESS NOTE ADULT - SUBJECTIVE AND OBJECTIVE BOX
Podiatry pager #: [phone]/ [phone]    Patient is a 88y old  Female who presents with a chief complaint of fall/ syncope (02 Jan 2024 09:18)Podiatry consultation of bilateral lower extremities      HPI:   x  (29 Dec 2023 16:36)      PAST MEDICAL & SURGICAL HISTORY:  Hypothyroid      Arthritis      Stomach ulcer      Hernia, hiatal      Spondylitis      Former smoker, stopped smoking many years ago      Hypothyroidism, unspecified type      History of bilateral hip replacements      Status post left knee replacement      No significant past surgical history          MEDICATIONS  (STANDING):  aspirin enteric coated 81 milliGRAM(s) Oral daily  atorvastatin 40 milliGRAM(s) Oral at bedtime  heparin   Injectable 5000 Unit(s) SubCutaneous every 12 hours  levothyroxine 75 MICROGram(s) Oral daily  oseltamivir 30 milliGRAM(s) Oral two times a day  pantoprazole    Tablet 40 milliGRAM(s) Oral before breakfast  senna 2 Tablet(s) Oral at bedtime    MEDICATIONS  (PRN):  oxyCODONE    IR 2.5 milliGRAM(s) Oral every 8 hours PRN Moderate Pain (4 - 6)      Allergies    vancomycin (Blisters)    Intolerances        VITALS:    Vital Signs Last 24 Hrs  T(C): 36.7 (03 Jan 2024 05:34), Max: 36.8 (02 Jan 2024 20:53)  T(F): 98.1 (03 Jan 2024 05:34), Max: 98.3 (02 Jan 2024 20:53)  HR: 73 (03 Jan 2024 05:34) (73 - 76)  BP: 139/79 (03 Jan 2024 05:34) (129/77 - 142/83)  BP(mean): --  RR: 16 (03 Jan 2024 05:34) (16 - 18)  SpO2: 93% (03 Jan 2024 05:34) (93% - 94%)    Parameters below as of 03 Jan 2024 05:34  Patient On (Oxygen Delivery Method): room air        LABS:                CAPILLARY BLOOD GLUCOSE              LOWER EXTREMITY PHYSICAL EXAM:    Vasular: DP/PT 1_/4, B/L, CFT <_ 2 seconds B/L, Temperature gradient wnl_, B/L.   Neuro: Epicritic sensation _intact to the level of _toes, B/L.  Skin: Bilateral heels early signs of DTI: Blanchable erythema.  No bulla no open ulcerations.  No purulence fluctuance malodor or clinical signs of cellulitis.  Positive pretrophic tyloma submetatarsal 5 right foot.:  Stable, noninfected       RADIOLOGY & ADDITIONAL STUDIES:     Podiatry pager #: [phone]/ [phone]    Patient is a 88y old  Female who presents with a chief complaint of fall/ syncope (02 Jan 2024 09:18)Podiatry consultation of bilateral lower extremities      HPI:   x  (29 Dec 2023 16:36)      PAST MEDICAL & SURGICAL HISTORY:  Hypothyroid      Arthritis      Stomach ulcer      Hernia, hiatal      Spondylitis      Former smoker, stopped smoking many years ago      Hypothyroidism, unspecified type      History of bilateral hip replacements      Status post left knee replacement      No significant past surgical history          MEDICATIONS  (STANDING):  aspirin enteric coated 81 milliGRAM(s) Oral daily  atorvastatin 40 milliGRAM(s) Oral at bedtime  heparin   Injectable 5000 Unit(s) SubCutaneous every 12 hours  levothyroxine 75 MICROGram(s) Oral daily  oseltamivir 30 milliGRAM(s) Oral two times a day  pantoprazole    Tablet 40 milliGRAM(s) Oral before breakfast  senna 2 Tablet(s) Oral at bedtime    MEDICATIONS  (PRN):  oxyCODONE    IR 2.5 milliGRAM(s) Oral every 8 hours PRN Moderate Pain (4 - 6)      Allergies    vancomycin (Blisters)    Intolerances        VITALS:    Vital Signs Last 24 Hrs  T(C): 36.7 (03 Jan 2024 05:34), Max: 36.8 (02 Jan 2024 20:53)  T(F): 98.1 (03 Jan 2024 05:34), Max: 98.3 (02 Jan 2024 20:53)  HR: 73 (03 Jan 2024 05:34) (73 - 76)  BP: 139/79 (03 Jan 2024 05:34) (129/77 - 142/83)  BP(mean): --  RR: 16 (03 Jan 2024 05:34) (16 - 18)  SpO2: 93% (03 Jan 2024 05:34) (93% - 94%)    Parameters below as of 03 Jan 2024 05:34  Patient On (Oxygen Delivery Method): room air        LABS:                CAPILLARY BLOOD GLUCOSE              LOWER EXTREMITY PHYSICAL EXAM:    Vasular: DP/PT 1_/4, B/L, CFT <_ 2 seconds B/L, Temperature gradient wnl_, B/L.   Neuro: Epicritic sensation _intact to the level of _toes, B/L.  Skin: Bilateral heels early signs of DTI: Blanchable erythema.  No bulla no open ulcerations.  No purulence fluctuance malodor or clinical signs of cellulitis.  Positive pretrophic tyloma submetatarsal 5 right foot.:  Stable, noninfected       RADIOLOGY & ADDITIONAL STUDIES:

## 2024-01-02 NOTE — PROGRESS NOTE ADULT - ASSESSMENT
Assessment/plan:    Bilateral heels early signs of DTI: Stable, present on admission, noninfected.    Recommend continue decubitus precautions and use of Z flow boots.  Recommend Cavilon to bilateral heels every other day.  Reconsult podiatry as needed

## 2024-01-02 NOTE — CHART NOTE - NSCHARTNOTEFT_GEN_A_CORE
Wound Care Team Note:    Request for wound care consult received from nursing. Patient refused assessment saying she was too tired. Will reattempt.    Amber Francois, NP-C, CWOCN via TEAMS

## 2024-01-02 NOTE — PROGRESS NOTE ADULT - ASSESSMENT
88 year old female      h/o  hypothyroidism, arthritis bilat hip replacements /  olecranon fx/ prior h/o  dizziness      arrives  via EMS after unwitnessed fall this morning.       pt  lives alone /  woke up at 3am which is normal for her. . was eating breakfast and then she remembers finding herself on the ground.     denies  dizziness, palpitations, chest pain/sob/ abd pain      pain in the left lower extremity ./   has  difficulty  in ambulation since  syncopal event/  xrays  in er,  no fx      syncope  / collapse /  cause   unknown/  suspect  from  influenza     influenza.   on tamiflu   Raunaud;s  disease/ fingers   Ct   head,  left b/ ganglia infarct/ not reported  as  acute       on asa/  lipitor   Hypothyroid     h/o b/l hip surg, in the past    xrays, no fx/  has  rom  of  b/l legs/ has difficulty on ambulation,  from hip  pain,  s/p  collapse   has no abd pain     CT  pelvis,  thickened   sigmoid  colon/ with ?  fistula/ ?  mass     on dvt ppx /   fall risk     ct  head. 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  mri   dept  made  aware  by  np    per house  gi.  will  need  colonoscopy    and  per  hcp,  does  not   want  any  intervention / testing / and  is  aware  that  pt  may  have  a  malignant   process     uti, Proteus,   on iv rocephin.      mri head  pending/  neuro  d r pishanidar  per  PT,   rehab was  recommended/  hcp  wants  home    d/c  palns  for  am/  to  home      hcp  wants  home  as dispo    lives  alone. has  no children/   and is  full  code    dallas Churchill  is  hcp/          ad< from: CT 3D Reconstruct w/ Workstation (12.29.23 @ 11:11) >  IMPRESSION:  1.  No fractures are seen.  2.  Long segment of mild to moderate wall thickening involves the upper   sigmoid colon with a soft tissue tract extending to the mid sigmoid   colon. These changes could reflect scarring fromprior inflammation and a   fistulous tract. Neoplasm cannot be absolutely excluded.  3.  Correlation with other prior imaging or prior colonoscopy is   suggested if available. Follow-up CT of the abdomen and pelvis with oral   and/or rectal contrast may be helpful to assess for a fistula. Consider   colonoscopy as warranted.  --- End of Report ---      rad< from: CT Head No Cont (12.29.23 @ 11:12) >  IMPRESSION:  1. 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 No evidence of acute intracranial injury.  --- End of Report ---            <  88 year old female      h/o  hypothyroidism, arthritis bilat hip replacements /  olecranon fx/ prior h/o  dizziness      arrives  via EMS after unwitnessed fall this morning.       pt  lives alone /  woke up at 3am which is normal for her. . was eating breakfast and then she remembers finding herself on the ground.     denies  dizziness, palpitations, chest pain/sob/ abd pain      pain in the left lower extremity ./   has  difficulty  in ambulation since  syncopal event/  xrays  in er,  no fx      syncope  / collapse /  cause   unknown/  suspect  from  influenza     influenza.   on tamiflu   Raunaud;s  disease/ fingers   Ct   head,  left b/ ganglia infarct/ not reported  as  acute       on asa/  lipitor   Hypothyroid     h/o b/l hip surg, in the past    xrays, no fx/  has  rom  of  b/l legs/ has difficulty on ambulation,  from hip  pain,  s/p  collapse   has no abd pain     CT  pelvis,  thickened   sigmoid  colon/ with ?  fistula/ ?  mass     on dvt ppx /   fall risk     ct  head. 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  mri   dept  made  aware  by  np    per house  gi.  will  need  colonoscopy    and  per  hcp,  does  not   want  any  intervention / testing / and  is  aware  that  pt  may  have  a  malignant   process     uti, Proteus,   on iv rocephin.    day  2/3   neuro  dr danielson..   and   hcp  does  not wnat mri/  carotid  dopplers  per  PT,   rehab was  recommended/  hcp  wants  home    d/c  plans   for  am/  to  home      hcp  wants  home  as dispo    lives  alone. has  no children/   and is  full  code    dallas Churchill  is  hcp/          ad< from: CT 3D Reconstruct w/ Workstation (12.29.23 @ 11:11) >  IMPRESSION:  1.  No fractures are seen.  2.  Long segment of mild to moderate wall thickening involves the upper   sigmoid colon with a soft tissue tract extending to the mid sigmoid   colon. These changes could reflect scarring fromprior inflammation and a   fistulous tract. Neoplasm cannot be absolutely excluded.  3.  Correlation with other prior imaging or prior colonoscopy is   suggested if available. Follow-up CT of the abdomen and pelvis with oral   and/or rectal contrast may be helpful to assess for a fistula. Consider   colonoscopy as warranted.  --- End of Report ---      rad< from: CT Head No Cont (12.29.23 @ 11:12) >  IMPRESSION:  1. 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 No evidence of acute intracranial injury.  --- End of Report ---            <  88 year old female      h/o  hypothyroidism, arthritis bilat hip replacements /  olecranon fx/ prior h/o  dizziness      arrives  via EMS after unwitnessed fall this morning.       pt  lives alone /  woke up at 3am which is normal for her. . was eating breakfast and then she remembers finding herself on the ground.     denies  dizziness, palpitations, chest pain/sob/ abd pain      pain in the left lower extremity ./   has  difficulty  in ambulation since  syncopal event/  xrays  in er,  no fx      syncope  / collapse /  cause   unknown/  suspect  from  influenza     influenza.   on tamiflu   Raunaud;s  disease/ fingers   Ct   head,  left b/ ganglia infarct/ not reported  as  acute       on asa/  lipitor   Hypothyroid     h/o b/l hip surg, in the past    xrays, no fx/  has  rom  of  b/l legs/ has difficulty on ambulation,  from hip  pain,  s/p  collapse   has no abd pain     CT  pelvis,  thickened   sigmoid  colon/ with ?  fistula/ ?  mass     on dvt ppx /   fall risk     ct  head. 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  mri   dept  made  aware  by  np    per house  gi.  will  need  colonoscopy    and  per  hcp,  does  not   want  any  intervention / testing / and  is  aware  that  pt  may  have  a  malignant   process    neuro  dr danielson..   and   hcp  does  not wnat mri/  carotid  dopplers  per  PT,   rehab was  recommended/  hcp  wants  home    d/c  plans   for  am/  to  home      hcp  wants  home  as dispo    lives  alone. has  no children/   and is  full  code    dallas Churchill  is  hcp/          ad< from: CT 3D Reconstruct w/ Workstation (12.29.23 @ 11:11) >  IMPRESSION:  1.  No fractures are seen.  2.  Long segment of mild to moderate wall thickening involves the upper   sigmoid colon with a soft tissue tract extending to the mid sigmoid   colon. These changes could reflect scarring fromprior inflammation and a   fistulous tract. Neoplasm cannot be absolutely excluded.  3.  Correlation with other prior imaging or prior colonoscopy is   suggested if available. Follow-up CT of the abdomen and pelvis with oral   and/or rectal contrast may be helpful to assess for a fistula. Consider   colonoscopy as warranted.  --- End of Report ---      rad< from: CT Head No Cont (12.29.23 @ 11:12) >  IMPRESSION:  1. 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 No evidence of acute intracranial injury.  --- End of Report ---            <

## 2024-01-02 NOTE — PROGRESS NOTE ADULT - SUBJECTIVE AND OBJECTIVE BOX
Date of Service: 01-02-24 @ 08:47           CARDIOLOGY     PROGRESS  NOTE   ________________________________________________    CHIEF COMPLAINT:Patient is a 88y old  Female who presents with a chief complaint of fall/ syncope (01 Jan 2024 16:15)  no  complain  	  REVIEW OF SYSTEMS:  CONSTITUTIONAL: No fever, weight loss, or fatigue  EYES: No eye pain, visual disturbances, or discharge  ENT:  No difficulty hearing, tinnitus, vertigo; No sinus or throat pain  NECK: No pain or stiffness  RESPIRATORY: No cough, wheezing, chills or hemoptysis; No Shortness of Breath  CARDIOVASCULAR: No chest pain, palpitations, passing out, dizziness, or leg swelling  GASTROINTESTINAL: No abdominal or epigastric pain. No nausea, vomiting, or hematemesis; No diarrhea or constipation. No melena or hematochezia.  GENITOURINARY: No dysuria, frequency, hematuria, or incontinence  NEUROLOGICAL: No headaches, memory loss, loss of strength, numbness, or tremors  SKIN: No itching, burning, rashes, or lesions   LYMPH Nodes: No enlarged glands  ENDOCRINE: No heat or cold intolerance; No hair loss  MUSCULOSKELETAL: No joint pain or swelling; No muscle, back, or extremity pain  PSYCHIATRIC: No depression, anxiety, mood swings, or difficulty sleeping  HEME/LYMPH: No easy bruising, or bleeding gums  ALLERGY AND IMMUNOLOGIC: No hives or eczema	    [x ] All others negative	  [ ] Unable to obtain    PHYSICAL EXAM:  T(C): 36.3 (01-02-24 @ 06:30), Max: 37.1 (01-01-24 @ 20:19)  HR: 70 (01-02-24 @ 06:30) (64 - 71)  BP: 129/71 (01-02-24 @ 06:30) (117/73 - 129/71)  RR: 18 (01-02-24 @ 06:30) (18 - 19)  SpO2: 95% (01-02-24 @ 06:30) (92% - 95%)  Wt(kg): --  I&O's Summary    01 Jan 2024 07:01  -  02 Jan 2024 07:00  --------------------------------------------------------  IN: 120 mL / OUT: 0 mL / NET: 120 mL        Appearance: Normal	  HEENT:   Normal oral mucosa, PERRL, EOMI	  Lymphatic: No lymphadenopathy  Cardiovascular: Normal S1 S2, No JVD, + murmurs, No edema  Respiratory: rhonchi  Psychiatry:dementia  Gastrointestinal:  Soft, Non-tender, + BS	  Skin: No rashes, No ecchymoses, No cyanosis	  Extremities: Normal range of motion, No clubbing, cyanosis or edema  Vascular: Peripheral pulses palpable 2+ bilaterally    MEDICATIONS  (STANDING):  aspirin enteric coated 81 milliGRAM(s) Oral daily  atorvastatin 40 milliGRAM(s) Oral at bedtime  heparin   Injectable 5000 Unit(s) SubCutaneous every 12 hours  levothyroxine 75 MICROGram(s) Oral daily  oseltamivir 30 milliGRAM(s) Oral two times a day  pantoprazole    Tablet 40 milliGRAM(s) Oral before breakfast  senna 2 Tablet(s) Oral at bedtime      TELEMETRY: 	    ECG:  	  RADIOLOGY:  OTHER: 	  	  LABS:	 	    CARDIAC MARKERS:      proBNP:   Lipid Profile:   HgA1c:   TSH: Thyroid Stimulating Hormone, Serum: 2.72 uIU/mL (12-31 @ 07:27)      < from: 12 Lead ECG (12.29.23 @ 08:21) >  Diagnosis Line NORMAL SINUS RHYTHM  POSSIBLE ANTERIOR INFARCT , AGE UNDETERMINED  ABNORMAL ECG  WHEN COMPARED WITH ECG OF 10-PRAFUL-2021 06:30,  NO SIGNIFICANT CHANGE WAS FOUND      < from: Xray Chest 1 View- PORTABLE-Urgent (12.29.23 @ 08:53) >  IMPRESSION:  Exam is partially limited secondary to patient's head overlying bilateral   apices.  Bibasilar atelectasis.    Assessment and plan  ---------------------------  88 year old female with hx of hypothyroidism, arthritis bilat hip replacements comes into the ED via EMS after unwitnessed fall this morning. Pt lives alone and states she woke up at 3am which is normal for her. Last thing she remembers is eating breakfast and then she remembers finding herself on the ground. She is not on any AC and does not remember having any dizziness, palpitations, chest pain or other symptoms prior to the fall. After waking up she reports having pain in the left lower extremity specifically in the left hip. She also reports she has been having a cough over the past few days. She denies any HA, neck pain, back pain, chest pain, change in vision, abd pain, n/v, urinary symptoms, sensory changes or motor weakness  syncope ?sec to dehydration/ influenza  iv hydration  ct scan noted ?colitis ?ischemia gi has been called  hypothyroid/  tsh  asa daily  TTE  started on Tamiflu  check orthostatic  dvt prophylaxis  oob to chair with assistant   GI evaluation noted, check stool for c diff  bp and hr is well controlled  abnormal ecg will check record from Dr Paredes

## 2024-01-02 NOTE — PROGRESS NOTE ADULT - SUBJECTIVE AND OBJECTIVE BOX
date of service: 01-02-24 @ 09:19  afbverile  REVIEW OF SYSTEMS:  CONSTITUTIONAL: No fever,  no  weight loss  ENT:  No  tinnitus,   no   vertigo  NECK: No pain or stiffness  RESPIRATORY: No cough, wheezing, chills or hemoptysis;    No Shortness of Breath  CARDIOVASCULAR: No chest pain, palpitations, dizziness  GASTROINTESTINAL: No abdominal or epigastric pain. No nausea, vomiting, or hematemesis; No diarrhea  No melena or hematochezia.  GENITOURINARY: No dysuria, frequency, hematuria, or incontinence  NEUROLOGICAL: No headaches  SKIN: No itching,  no   rash  LYMPH Nodes: No enlarged glands  ENDOCRINE: No heat or cold intolerance  MUSCULOSKELETAL: No joint pain or swelling  PSYCHIATRIC: No depression, anxiety  HEME/LYMPH: No easy bruising, or bleeding gums  ALLERGY AND IMMUNOLOGIC: No hives or eczema	    MEDICATIONS  (STANDING):  aspirin enteric coated 81 milliGRAM(s) Oral daily  atorvastatin 40 milliGRAM(s) Oral at bedtime  heparin   Injectable 5000 Unit(s) SubCutaneous every 12 hours  levothyroxine 75 MICROGram(s) Oral daily  oseltamivir 30 milliGRAM(s) Oral two times a day  pantoprazole    Tablet 40 milliGRAM(s) Oral before breakfast  senna 2 Tablet(s) Oral at bedtime    MEDICATIONS  (PRN):  oxyCODONE    IR 2.5 milliGRAM(s) Oral every 8 hours PRN Moderate Pain (4 - 6)      Vital Signs Last 24 Hrs  T(C): 36.3 (02 Jan 2024 06:30), Max: 37.1 (01 Jan 2024 20:19)  T(F): 97.4 (02 Jan 2024 06:30), Max: 98.7 (01 Jan 2024 20:19)  HR: 70 (02 Jan 2024 06:30) (64 - 71)  BP: 129/71 (02 Jan 2024 06:30) (117/73 - 129/71)  BP(mean): --  RR: 18 (02 Jan 2024 06:30) (18 - 19)  SpO2: 95% (02 Jan 2024 06:30) (92% - 95%)    Parameters below as of 02 Jan 2024 06:30  Patient On (Oxygen Delivery Method): room air      CAPILLARY BLOOD GLUCOSE        I&O's Summary    01 Jan 2024 07:01  -  02 Jan 2024 07:00  --------------------------------------------------------  IN: 120 mL / OUT: 0 mL / NET: 120 mL          Appearance: Normal	  HEENT:   Normal oral mucosa, PERRL, EOMI	  Lymphatic: No lymphadenopathy  Cardiovascular: Normal S1 S2, No JVD  Respiratory: Lungs clear to auscultation	  Gastrointestinal:  Soft, Non-tender, + BS	  Skin: No rash, No ecchymoses	  Extremities:     LABS:                          Thyroid Stimulating Hormone, Serum: 2.72 uIU/mL (12-31 @ 07:27)          Consultant(s) Notes Reviewed:      Care Discussed with Consultants/Other Providers:

## 2024-01-02 NOTE — PROGRESS NOTE ADULT - ASSESSMENT
88 year old female with hypothyroidism, arthritis bilat hip replacements comes into the ED via EMS after unwitnessed fall.    CTH old L caudate infarct /BG infarct   UA+   Ucx --> GNR   + flu   TSH WNL o/e very hard of hearaing but LOWE =    Impression:   1) unwittnessed fall/syncope in setting of flu and UTI  2) AMS likely toxic metabolic in setting of infection  3) chronic L caudate small vessel stroke     - for secondary stroke prevention c/w asa 81mg daily and statin therapy with LDL goal < 70mg/dL   - getting tamiflu  - f/u urine cx   - MRI brain ordered, will f/u.    - TTE pending   - check carotid duplex    - telemetry  - PT/OT/SS/SLP, OOBC  - check FS, glucose control <180  - GI/DVT ppx    Yasmany Alvarez MD  Vascular Neurology  Office: [phone]  88 year old female with hypothyroidism, arthritis bilat hip replacements comes into the ED via EMS after unwitnessed fall.    CTH old L caudate infarct /BG infarct   UA+   Ucx --> GNR   + flu   TSH WNL o/e very hard of hearaing but LOWE =    Impression:   1) unwittnessed fall/syncope in setting of flu and UTI  2) AMS likely toxic metabolic in setting of infection  3) chronic L caudate small vessel stroke     - for secondary stroke prevention c/w asa 81mg daily and statin therapy with LDL goal < 70mg/dL   - getting tamiflu  - f/u urine cx   - MRI brain ordered, will f/u.    - TTE pending   - check carotid duplex    - telemetry  - PT/OT/SS/SLP, OOBC  - check FS, glucose control <180  - GI/DVT ppx    Yasmany Alvarez MD  Vascular Neurology  Office: [phone]

## 2024-01-02 NOTE — PROGRESS NOTE ADULT - SUBJECTIVE AND OBJECTIVE BOX
Neurology        S: jose seen. doing okay   \    Medications: MEDICATIONS  (STANDING):  aspirin enteric coated 81 milliGRAM(s) Oral daily  atorvastatin 40 milliGRAM(s) Oral at bedtime  heparin   Injectable 5000 Unit(s) SubCutaneous every 12 hours  levothyroxine 75 MICROGram(s) Oral daily  oseltamivir 30 milliGRAM(s) Oral two times a day  pantoprazole    Tablet 40 milliGRAM(s) Oral before breakfast  senna 2 Tablet(s) Oral at bedtime    MEDICATIONS  (PRN):  oxyCODONE    IR 2.5 milliGRAM(s) Oral every 8 hours PRN Moderate Pain (4 - 6)       Vitals:  Vital Signs Last 24 Hrs  T(C): 36.3 (02 Jan 2024 06:30), Max: 37.1 (01 Jan 2024 20:19)  T(F): 97.4 (02 Jan 2024 06:30), Max: 98.7 (01 Jan 2024 20:19)  HR: 70 (02 Jan 2024 06:30) (64 - 71)  BP: 129/71 (02 Jan 2024 06:30) (117/73 - 129/71)  BP(mean): --  RR: 18 (02 Jan 2024 06:30) (18 - 19)  SpO2: 95% (02 Jan 2024 06:30) (92% - 95%)    Parameters below as of 02 Jan 2024 06:30  Patient On (Oxygen Delivery Method): room air         General Exam:   General Appearance: Appropriately dressed and in no acute distress       Head: Normocephalic, atraumatic and no dysmorphic features  Ear, Nose, and Throat: Moist mucous membranes  CVS: S1S2+  Resp: No SOB, no wheeze or rhonchi  GI: soft NT/ND  Extremities: No edema or cyanosis  Skin: No bruises or rashes     Neurological Exam:  Mental Status: Awake, alert and oriented x 1.  Able to follow simple verbal commands. Able to name and repeat. fluent speech. No obvious aphasia or dysarthria noted.   Cranial Nerves: PERRL, EOMI, VFFC, sensation V1-V3 intact,  no obvious facial asymmetry, equal elevation of palate, scm/trap 5/5, tongue is midline on protrusion. no obvious papilledema on fundoscopic exam. very hard of hearing   Motor:  LOWE at least 4/5 throuhgout   Sensation: Intact to light touch and pinprick throughout. no right/left confusion. no extinction to tactile on DSS. Romberg was negative.   Reflexes: 1+ throughout at biceps, brachioradialis, triceps, patellars and ankles bilaterally and equal. No clonus. R toe and L toe were both downgoing.  Coordination: No dysmetria on FNF    Gait: deferred     Data/Labs/Imaging which I personally reviewed.     Labs:   no new labs                  < from: CT Head No Cont (12.29.23 @ 11:12) >    ACC: [id number] EXAM:  CT BRAIN   ORDERED BY:  ROQUE CALABRESE     PROCEDURE DATE:  12/29/2023          INTERPRETATION:  CT head without IV contrast    CLINICAL INFORMATION:     Syncope and fall unwitnessed  MCT     INTRACRANIAL HEMORRHAGE.  INJURY.   TRAUMA.  ENCOUNTER / stage of care for trauma patient:     Initial    TECHNIQUE:  Contiguous axial 3 mm thick images were acquired.  This data   set was reconstructed in the sagittal and coronal planes.  Contrast was   not administered for this examination.  Note:   Artifact from gross   patient motion causes image blurring and limits evaluation.  Images were   repeated because of patient motion.  CONTRAST:    None  DOSE INFORMATION:   This scan was performed using automatic exposure   control (radiation dose reduction software) to obtain a diagnostic image   quality scan with patient dose as low as reasonably achievable.   Total   DLP for this examination is estimated at 623 mGy*cm.    COMPARISON:   CT head 2021 available for review.    FINDINGS:    BRAIN:    The brain  demonstrates a small focal lesion of remote deep   infarction at the left caudate nucleus head neck junction region. No   cerebral cortical lesion has developed.   Cerebral cortical gray-white   matter differentiation is maintained.  No acute cerebral cortical infarct   is seen.  No intracranial hemorrhage is found.  No mass effect is found   in the brain.    CSF SPACES:  The ventricles, sulci and basal cisterns appear appear   mildly dilated reflecting diffuse brain volume loss. dilated reflecting   diffuse brain volume loss.   No differential cerebral cortical atrophy is   recognized.  However, there is differential dilatation of the temporal   horns of the lateral ventricles associated with differential hippocampal   formation atrophy.    VESSELS: Intracranial vasculature is also significant for atherosclerotic   calcifications within the cavernous segments of the internal carotid   arteries.    HEAD AND NECK STRUCTURES:  The orbits are unremarkable.  The included   paranasal sinuses  demonstrate mucosal opacification of the right   sphenoid sinus. 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  The nasopharynx is symmetric.    The central skull base is intact.  The temporal bones demonstrate patent   petrous air cells.  The calvarium appears unremarkable.      IMPRESSION:    1. 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 No evidence of acute intracranial injury.    --- End of Report ---            EDUARDO KINSEY MD; Attending Radiologist  This document has been electronically signed. Dec 29 2023 11:39AM    < end of copied text >           Neurology        S: jose seen. doing okay   \    Medications: MEDICATIONS  (STANDING):  aspirin enteric coated 81 milliGRAM(s) Oral daily  atorvastatin 40 milliGRAM(s) Oral at bedtime  heparin   Injectable 5000 Unit(s) SubCutaneous every 12 hours  levothyroxine 75 MICROGram(s) Oral daily  oseltamivir 30 milliGRAM(s) Oral two times a day  pantoprazole    Tablet 40 milliGRAM(s) Oral before breakfast  senna 2 Tablet(s) Oral at bedtime    MEDICATIONS  (PRN):  oxyCODONE    IR 2.5 milliGRAM(s) Oral every 8 hours PRN Moderate Pain (4 - 6)       Vitals:  Vital Signs Last 24 Hrs  T(C): 36.3 (02 Jan 2024 06:30), Max: 37.1 (01 Jan 2024 20:19)  T(F): 97.4 (02 Jan 2024 06:30), Max: 98.7 (01 Jan 2024 20:19)  HR: 70 (02 Jan 2024 06:30) (64 - 71)  BP: 129/71 (02 Jan 2024 06:30) (117/73 - 129/71)  BP(mean): --  RR: 18 (02 Jan 2024 06:30) (18 - 19)  SpO2: 95% (02 Jan 2024 06:30) (92% - 95%)    Parameters below as of 02 Jan 2024 06:30  Patient On (Oxygen Delivery Method): room air         General Exam:   General Appearance: Appropriately dressed and in no acute distress       Head: Normocephalic, atraumatic and no dysmorphic features  Ear, Nose, and Throat: Moist mucous membranes  CVS: S1S2+  Resp: No SOB, no wheeze or rhonchi  GI: soft NT/ND  Extremities: No edema or cyanosis  Skin: No bruises or rashes     Neurological Exam:  Mental Status: Awake, alert and oriented x 1.  Able to follow simple verbal commands. Able to name and repeat. fluent speech. No obvious aphasia or dysarthria noted.   Cranial Nerves: PERRL, EOMI, VFFC, sensation V1-V3 intact,  no obvious facial asymmetry, equal elevation of palate, scm/trap 5/5, tongue is midline on protrusion. no obvious papilledema on fundoscopic exam. very hard of hearing   Motor:  LOWE at least 4/5 throuhgout   Sensation: Intact to light touch and pinprick throughout. no right/left confusion. no extinction to tactile on DSS. Romberg was negative.   Reflexes: 1+ throughout at biceps, brachioradialis, triceps, patellars and ankles bilaterally and equal. No clonus. R toe and L toe were both downgoing.  Coordination: No dysmetria on FNF    Gait: deferred     Data/Labs/Imaging which I personally reviewed.     Labs:   no new labs                  < from: CT Head No Cont (12.29.23 @ 11:12) >    ACC: [id number] EXAM:  CT BRAIN   ORDERED BY:  ROQUE CALABRESE     PROCEDURE DATE:  12/29/2023          INTERPRETATION:  CT head without IV contrast    CLINICAL INFORMATION:     Syncope and fall unwitnessed  MCT     INTRACRANIAL HEMORRHAGE.  INJURY.   TRAUMA.  ENCOUNTER / stage of care for trauma patient:     Initial    TECHNIQUE:  Contiguous axial 3 mm thick images were acquired.  This data   set was reconstructed in the sagittal and coronal planes.  Contrast was   not administered for this examination.  Note:   Artifact from gross   patient motion causes image blurring and limits evaluation.  Images were   repeated because of patient motion.  CONTRAST:    None  DOSE INFORMATION:   This scan was performed using automatic exposure   control (radiation dose reduction software) to obtain a diagnostic image   quality scan with patient dose as low as reasonably achievable.   Total   DLP for this examination is estimated at 623 mGy*cm.    COMPARISON:   CT head 2021 available for review.    FINDINGS:    BRAIN:    The brain  demonstrates a small focal lesion of remote deep   infarction at the left caudate nucleus head neck junction region. No   cerebral cortical lesion has developed.   Cerebral cortical gray-white   matter differentiation is maintained.  No acute cerebral cortical infarct   is seen.  No intracranial hemorrhage is found.  No mass effect is found   in the brain.    CSF SPACES:  The ventricles, sulci and basal cisterns appear appear   mildly dilated reflecting diffuse brain volume loss. dilated reflecting   diffuse brain volume loss.   No differential cerebral cortical atrophy is   recognized.  However, there is differential dilatation of the temporal   horns of the lateral ventricles associated with differential hippocampal   formation atrophy.    VESSELS: Intracranial vasculature is also significant for atherosclerotic   calcifications within the cavernous segments of the internal carotid   arteries.    HEAD AND NECK STRUCTURES:  The orbits are unremarkable.  The included   paranasal sinuses  demonstrate mucosal opacification of the right   sphenoid sinus. 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  The nasopharynx is symmetric.    The central skull base is intact.  The temporal bones demonstrate patent   petrous air cells.  The calvarium appears unremarkable.      IMPRESSION:    1. 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 No evidence of acute intracranial injury.    --- End of Report ---            EDUARDO KINSEY MD; Attending Radiologist  This document has been electronically signed. Dec 29 2023 11:39AM    < end of copied text >

## 2024-01-03 ENCOUNTER — TRANSCRIPTION ENCOUNTER (OUTPATIENT)
Age: 89
End: 2024-01-03

## 2024-01-03 RX ORDER — CIPROFLOXACIN LACTATE 400MG/40ML
250 VIAL (ML) INTRAVENOUS
Refills: 0 | Status: DISCONTINUED | OUTPATIENT
Start: 2024-01-03 | End: 2024-01-04

## 2024-01-03 RX ORDER — OXYCODONE HYDROCHLORIDE 5 MG/1
2.5 TABLET ORAL
Refills: 0 | Status: DISCONTINUED | OUTPATIENT
Start: 2024-01-03 | End: 2024-01-04

## 2024-01-03 RX ORDER — OXYCODONE HYDROCHLORIDE 5 MG/1
0.5 TABLET ORAL
Qty: 3 | Refills: 0
Start: 2024-01-03 | End: 2024-01-05

## 2024-01-03 RX ORDER — CIPROFLOXACIN LACTATE 400MG/40ML
1 VIAL (ML) INTRAVENOUS
Qty: 5 | Refills: 0
Start: 2024-01-03 | End: 2024-01-05

## 2024-01-03 RX ORDER — NALOXONE HYDROCHLORIDE 4 MG/.1ML
4 SPRAY NASAL
Qty: 1 | Refills: 0
Start: 2024-01-03

## 2024-01-03 RX ORDER — ATORVASTATIN CALCIUM 80 MG/1
1 TABLET, FILM COATED ORAL
Qty: 30 | Refills: 0
Start: 2024-01-03 | End: 2024-02-01

## 2024-01-03 RX ADMIN — OXYCODONE HYDROCHLORIDE 2.5 MILLIGRAM(S): 5 TABLET ORAL at 13:45

## 2024-01-03 RX ADMIN — Medication 30 MILLIGRAM(S): at 13:44

## 2024-01-03 RX ADMIN — ATORVASTATIN CALCIUM 40 MILLIGRAM(S): 80 TABLET, FILM COATED ORAL at 21:10

## 2024-01-03 RX ADMIN — SENNA PLUS 2 TABLET(S): 8.6 TABLET ORAL at 21:11

## 2024-01-03 RX ADMIN — HEPARIN SODIUM 5000 UNIT(S): 5000 INJECTION INTRAVENOUS; SUBCUTANEOUS at 05:08

## 2024-01-03 RX ADMIN — Medication 81 MILLIGRAM(S): at 13:43

## 2024-01-03 RX ADMIN — OXYCODONE HYDROCHLORIDE 2.5 MILLIGRAM(S): 5 TABLET ORAL at 05:12

## 2024-01-03 RX ADMIN — Medication 75 MICROGRAM(S): at 05:08

## 2024-01-03 RX ADMIN — OXYCODONE HYDROCHLORIDE 2.5 MILLIGRAM(S): 5 TABLET ORAL at 21:10

## 2024-01-03 RX ADMIN — PANTOPRAZOLE SODIUM 40 MILLIGRAM(S): 20 TABLET, DELAYED RELEASE ORAL at 05:09

## 2024-01-03 RX ADMIN — Medication 30 MILLIGRAM(S): at 05:08

## 2024-01-03 RX ADMIN — Medication 250 MILLIGRAM(S): at 13:43

## 2024-01-03 RX ADMIN — OXYCODONE HYDROCHLORIDE 2.5 MILLIGRAM(S): 5 TABLET ORAL at 22:10

## 2024-01-03 RX ADMIN — OXYCODONE HYDROCHLORIDE 2.5 MILLIGRAM(S): 5 TABLET ORAL at 05:08

## 2024-01-03 NOTE — PROGRESS NOTE ADULT - ASSESSMENT
88 year old female with hypothyroidism, arthritis bilat hip replacements comes into the ED via EMS after unwitnessed fall.    CTH old L caudate infarct /BG infarct   UA+   Ucx --> GNR   + flu   TSH WNL o/e very hard of hearing but LOWE =    Impression:   1) unwittnessed fall/syncope in setting of flu and UTI  2) AMS likely toxic metabolic in setting of infection  3) chronic L caudate small vessel stroke     - for secondary stroke prevention c/w asa 81mg daily and statin therapy with LDL goal < 70mg/dL   - getting tamiflu  - f/u urine cx   - MRI brain ordered, will f/u.    - TTE pending   - check carotid duplex  can be in or outpatient   - telemetry  - PT/OT/SS/SLP, OOBC  - check FS, glucose control <180  - GI/DVT ppx    Yasmany Alvarez MD  Vascular Neurology  Office: [phone]  88 year old female with hypothyroidism, arthritis bilat hip replacements comes into the ED via EMS after unwitnessed fall.    CTH old L caudate infarct /BG infarct   UA+   Ucx --> GNR   + flu   TSH WNL o/e very hard of hearing but LOWE =    Impression:   1) unwittnessed fall/syncope in setting of flu and UTI  2) AMS likely toxic metabolic in setting of infection  3) chronic L caudate small vessel stroke     - for secondary stroke prevention c/w asa 81mg daily and statin therapy with LDL goal < 70mg/dL   - getting tamiflu  - f/u urine cx   - MRI brain ordered, will f/u.    - TTE pending   - check carotid duplex  can be in or outpatient   - telemetry  - PT/OT/SS/SLP, OOBC  - check FS, glucose control <180  - GI/DVT ppx    Yasmany Alvarez MD  Vascular Neurology  Office: [phone]

## 2024-01-03 NOTE — PROGRESS NOTE ADULT - ASSESSMENT
88 year old female      h/o  hypothyroidism, arthritis bilat hip replacements /  olecranon fx/ prior h/o  dizziness      arrives  via EMS after unwitnessed fall this morning.       pt  lives alone /  woke up at 3am which is normal for her. . was eating breakfast and then she remembers finding herself on the ground.     denies  dizziness, palpitations, chest pain/sob/ abd pain      pain in the left lower extremity ./   has  difficulty  in ambulation since  syncopal event/  xrays  in er,  no fx      syncope  / collapse /  cause   unknown/  suspect  from  influenza     influenza.   on tamiflu   Raunaud;s  disease/ fingers   Ct   head,  left b/ ganglia infarct/ not reported  as  acute       on asa/  lipitor   Hypothyroid     h/o b/l hip surg, in the past    xrays, no fx/  has  rom  of  b/l legs/ has difficulty on ambulation,  from hip  pain,  s/p  collapse   has no abd pain     CT  pelvis,  thickened   sigmoid  colon/ with ?  fistula/ ?  mass     on dvt ppx /   fall risk     ct  head. 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  mri   dept  made  aware  by  np    per house  gi.  will  need  colonoscopy    and  per  hcp,  does  not   want  any  intervention / testing / and  is  aware  that  pt  may  have  a  malignant   process     uti, Proteus,   on iv rocephin.    day  5/3   neuro  dr danielson..   and   hcp  does  not wnat mri/  carotid  dopplers  per  PT,   rehab was  recommended/  hcp  wants  home    d/c  plans   /  home today      hcp  wants  home  as dispo    lives  alone. has  no children/   and is  full  code    dallas Churchill  is  hcp/          ad< from: CT 3D Reconstruct w/ Workstation (12.29.23 @ 11:11) >  IMPRESSION:  1.  No fractures are seen.  2.  Long segment of mild to moderate wall thickening involves the upper   sigmoid colon with a soft tissue tract extending to the mid sigmoid   colon. These changes could reflect scarring fromprior inflammation and a   fistulous tract. Neoplasm cannot be absolutely excluded.  3.  Correlation with other prior imaging or prior colonoscopy is   suggested if available. Follow-up CT of the abdomen and pelvis with oral   and/or rectal contrast may be helpful to assess for a fistula. Consider   colonoscopy as warranted.  --- End of Report ---      rad< from: CT Head No Cont (12.29.23 @ 11:12) >  IMPRESSION:  1. 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 No evidence of acute intracranial injury.  --- End of Report ---            <  88 year old female      h/o  hypothyroidism, arthritis bilat hip replacements /  olecranon fx/ prior h/o  dizziness      arrives  via EMS after unwitnessed fall this morning.       pt  lives alone /  woke up at 3am which is normal for her. . was eating breakfast and then she remembers finding herself on the ground.     denies  dizziness, palpitations, chest pain/sob/ abd pain      pain in the left lower extremity ./   has  difficulty  in ambulation since  syncopal event/  xrays  in er,  no fx      syncope  / collapse /  cause   unknown/  suspect  from  influenza     influenza.   on tamiflu   Raunaud;s  disease/ fingers   Ct   head,  left b/ ganglia infarct/ not reported  as  acute       on asa/  lipitor   Hypothyroid     h/o b/l hip surg, in the past    xrays, no fx/  has  rom  of  b/l legs/ has difficulty on ambulation,  from hip  pain,  s/p  collapse   has no abd pain     CT  pelvis,  thickened   sigmoid  colon/ with ?  fistula/ ?  mass     on dvt ppx /   fall risk     ct  head. 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  mri   dept  made  aware  by  np    per house  gi.  will  need  colonoscopy    and  per  hcp,  does  not   want  any  intervention / testing / and  is  aware  that  pt  may  have  a  malignant   process     uti, Proteus,  on  cipro   neuro  dr danielson..   and   hcp  does  not wnat mri/  carotid  dopplers  per  PT,   rehab was  recommended/  hcp  wants  home    d/c  plans   /  home today      hcp  wants  home  as dispo    lives  alone. has  no children/   and is  full  code    dallas Churchill  is  hcp/          ad< from: CT 3D Reconstruct w/ Workstation (12.29.23 @ 11:11) >  IMPRESSION:  1.  No fractures are seen.  2.  Long segment of mild to moderate wall thickening involves the upper   sigmoid colon with a soft tissue tract extending to the mid sigmoid   colon. These changes could reflect scarring fromprior inflammation and a   fistulous tract. Neoplasm cannot be absolutely excluded.  3.  Correlation with other prior imaging or prior colonoscopy is   suggested if available. Follow-up CT of the abdomen and pelvis with oral   and/or rectal contrast may be helpful to assess for a fistula. Consider   colonoscopy as warranted.  --- End of Report ---      rad< from: CT Head No Cont (12.29.23 @ 11:12) >  IMPRESSION:  1. 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 No evidence of acute intracranial injury.  --- End of Report ---            <

## 2024-01-03 NOTE — PROGRESS NOTE ADULT - SUBJECTIVE AND OBJECTIVE BOX
Date of Service: 01-03-24 @ 08:47           CARDIOLOGY     PROGRESS  NOTE   ________________________________________________    CHIEF COMPLAINT:Patient is a 88y old  Female who presents with a chief complaint of fall/ syncope (02 Jan 2024 16:37)  doing better  	  REVIEW OF SYSTEMS:  CONSTITUTIONAL: No fever, weight loss, or fatigue  EYES: No eye pain, visual disturbances, or discharge  ENT:  No difficulty hearing, tinnitus, vertigo; No sinus or throat pain  NECK: No pain or stiffness  RESPIRATORY: No cough, wheezing, chills or hemoptysis; + mild ess of Breath  CARDIOVASCULAR: No chest pain, palpitations, passing out, dizziness, or leg swelling  GASTROINTESTINAL: No abdominal or epigastric pain. No nausea, vomiting, or hematemesis; No diarrhea or constipation. No melena or hematochezia.  GENITOURINARY: No dysuria, frequency, hematuria, or incontinence  NEUROLOGICAL: No headaches, memory loss, loss of strength, numbness, or tremors  SKIN: No itching, burning, rashes, or lesions   LYMPH Nodes: No enlarged glands  ENDOCRINE: No heat or cold intolerance; No hair loss  MUSCULOSKELETAL: No joint pain or swelling; No muscle, back, or extremity pain  PSYCHIATRIC: No depression, anxiety, mood swings, or difficulty sleeping  HEME/LYMPH: No easy bruising, or bleeding gums  ALLERGY AND IMMUNOLOGIC: No hives or eczema	    [ x All others negative	  [ ] Unable to obtain    PHYSICAL EXAM:  T(C): 36.7 (01-03-24 @ 05:34), Max: 36.8 (01-02-24 @ 20:53)  HR: 73 (01-03-24 @ 05:34) (73 - 76)  BP: 139/79 (01-03-24 @ 05:34) (129/77 - 142/83)  RR: 16 (01-03-24 @ 05:34) (16 - 18)  SpO2: 93% (01-03-24 @ 05:34) (93% - 94%)  Wt(kg): --  I&O's Summary    02 Jan 2024 07:01  -  03 Jan 2024 07:00  --------------------------------------------------------  IN: 360 mL / OUT: 220 mL / NET: 140 mL        Appearance: Normal	  HEENT:   Normal oral mucosa, PERRL, EOMI	  Lymphatic: No lymphadenopathy  Cardiovascular: Normal S1 S2, No JVD, +murmurs, No edema  Respiratory:rhonchi  Gastrointestinal:  Soft, Non-tender, + BS	  Skin: No rashes, No ecchymoses, No cyanosis	  Neurologic: Non-focal  Extremities: Normal range of motion, No clubbing, cyanosis or edema  Vascular: Peripheral pulses palpable 2+ bilaterally    MEDICATIONS  (STANDING):  aspirin enteric coated 81 milliGRAM(s) Oral daily  atorvastatin 40 milliGRAM(s) Oral at bedtime  heparin   Injectable 5000 Unit(s) SubCutaneous every 12 hours  levothyroxine 75 MICROGram(s) Oral daily  oseltamivir 30 milliGRAM(s) Oral two times a day  pantoprazole    Tablet 40 milliGRAM(s) Oral before breakfast  senna 2 Tablet(s) Oral at bedtime      TELEMETRY: 	    ECG:  	  RADIOLOGY:  OTHER: 	  	  LABS:	 	    CARDIAC MARKERS:    proBNP:   Lipid Profile:   HgA1c:   TSH: Thyroid Stimulating Hormone, Serum: 2.72 uIU/mL (12-31 @ 07:27)    - Would send GI PCR/cdiff if having diarrhea   - monitor stool for blood   - can potentially plan for colonoscopy on Tuesday if within patients [facility]         Assessment and plan  ---------------------------  88 year old female with hx of hypothyroidism, arthritis bilat hip replacements comes into the ED via EMS after unwitnessed fall this morning. Pt lives alone and states she woke up at 3am which is normal for her. Last thing she remembers is eating breakfast and then she remembers finding herself on the ground. She is not on any AC and does not remember having any dizziness, palpitations, chest pain or other symptoms prior to the fall. After waking up she reports having pain in the left lower extremity specifically in the left hip. She also reports she has been having a cough over the past few days. She denies any HA, neck pain, back pain, chest pain, change in vision, abd pain, n/v, urinary symptoms, sensory changes or motor weakness  syncope ?sec to dehydration/ influenza  iv hydration  ct scan noted ?colitis ?ischemia gi has been called  hypothyroid/  tsh  asa daily  TTE  started on Tamiflu  check orthostatic  dvt prophylaxis  oob to chair with assistant   GI evaluation noted, check stool for c diff  bp and hr is well controlled  abnormal ecg will check record from Dr Paredes  check orthostatic  gi recommended colonoscopy      	         Date of Service: 01-03-24 @ 08:47           CARDIOLOGY     PROGRESS  NOTE   ________________________________________________    CHIEF COMPLAINT:Patient is a 88y old  Female who presents with a chief complaint of fall/ syncope (02 Jan 2024 16:37)  doing better  	  REVIEW OF SYSTEMS:  CONSTITUTIONAL: No fever, weight loss, or fatigue  EYES: No eye pain, visual disturbances, or discharge  ENT:  No difficulty hearing, tinnitus, vertigo; No sinus or throat pain  NECK: No pain or stiffness  RESPIRATORY: No cough, wheezing, chills or hemoptysis; + mild ess of Breath  CARDIOVASCULAR: No chest pain, palpitations, passing out, dizziness, or leg swelling  GASTROINTESTINAL: No abdominal or epigastric pain. No nausea, vomiting, or hematemesis; No diarrhea or constipation. No melena or hematochezia.  GENITOURINARY: No dysuria, frequency, hematuria, or incontinence  NEUROLOGICAL: No headaches, memory loss, loss of strength, numbness, or tremors  SKIN: No itching, burning, rashes, or lesions   LYMPH Nodes: No enlarged glands  ENDOCRINE: No heat or cold intolerance; No hair loss  MUSCULOSKELETAL: No joint pain or swelling; No muscle, back, or extremity pain  PSYCHIATRIC: No depression, anxiety, mood swings, or difficulty sleeping  HEME/LYMPH: No easy bruising, or bleeding gums  ALLERGY AND IMMUNOLOGIC: No hives or eczema	    [ x All others negative	  [ ] Unable to obtain    PHYSICAL EXAM:  T(C): 36.7 (01-03-24 @ 05:34), Max: 36.8 (01-02-24 @ 20:53)  HR: 73 (01-03-24 @ 05:34) (73 - 76)  BP: 139/79 (01-03-24 @ 05:34) (129/77 - 142/83)  RR: 16 (01-03-24 @ 05:34) (16 - 18)  SpO2: 93% (01-03-24 @ 05:34) (93% - 94%)  Wt(kg): --  I&O's Summary    02 Jan 2024 07:01  -  03 Jan 2024 07:00  --------------------------------------------------------  IN: 360 mL / OUT: 220 mL / NET: 140 mL        Appearance: Normal	  HEENT:   Normal oral mucosa, PERRL, EOMI	  Lymphatic: No lymphadenopathy  Cardiovascular: Normal S1 S2, No JVD, +murmurs, No edema  Respiratory:rhonchi  Gastrointestinal:  Soft, Non-tender, + BS	  Skin: No rashes, No ecchymoses, No cyanosis	  Neurologic: Non-focal  Extremities: Normal range of motion, No clubbing, cyanosis or edema  Vascular: Peripheral pulses palpable 2+ bilaterally    MEDICATIONS  (STANDING):  aspirin enteric coated 81 milliGRAM(s) Oral daily  atorvastatin 40 milliGRAM(s) Oral at bedtime  heparin   Injectable 5000 Unit(s) SubCutaneous every 12 hours  levothyroxine 75 MICROGram(s) Oral daily  oseltamivir 30 milliGRAM(s) Oral two times a day  pantoprazole    Tablet 40 milliGRAM(s) Oral before breakfast  senna 2 Tablet(s) Oral at bedtime      TELEMETRY: 	    ECG:  	  RADIOLOGY:  OTHER: 	  	  LABS:	 	    CARDIAC MARKERS:    proBNP:   Lipid Profile:   HgA1c:   TSH: Thyroid Stimulating Hormone, Serum: 2.72 uIU/mL (12-31 @ 07:27)    - Would send GI PCR/cdiff if having diarrhea   - monitor stool for blood   - can potentially plan for colonoscopy on Tuesday if within patients [facility]         Assessment and plan  ---------------------------  88 year old female with hx of hypothyroidism, arthritis bilat hip replacements comes into the ED via EMS after unwitnessed fall this morning. Pt lives alone and states she woke up at 3am which is normal for her. Last thing she remembers is eating breakfast and then she remembers finding herself on the ground. She is not on any AC and does not remember having any dizziness, palpitations, chest pain or other symptoms prior to the fall. After waking up she reports having pain in the left lower extremity specifically in the left hip. She also reports she has been having a cough over the past few days. She denies any HA, neck pain, back pain, chest pain, change in vision, abd pain, n/v, urinary symptoms, sensory changes or motor weakness  syncope ?sec to dehydration/ influenza  iv hydration  ct scan noted ?colitis ?ischemia gi has been called  hypothyroid/  tsh  asa daily  TTE  started on Tamiflu  check orthostatic  dvt prophylaxis  oob to chair with assistant   GI evaluation noted, check stool for c diff  bp and hr is well controlled  abnormal ecg will check record from Dr Paredes  check orthostatic  gi recommended colonoscopy

## 2024-01-03 NOTE — ADVANCED PRACTICE NURSE CONSULT - REASON FOR CONSULT
Consult to assess patient skin initiated by RN for sacrum stage 1 pressure injury. present on admission.      Review of Systems:  Review of Systems: bREVIEW OF SYSTEMS:  CONSTITUTIONAL: No fever,  no  weight loss  ENT:  No  tinnitus,   no   vertigo  NECK: No pain or stiffness  RESPIRATORY: No cough, wheezing, chills or hemoptysis;    No Shortness of Breath  CARDIOVASCULAR: No chest pain, palpitations, dizziness  GASTROINTESTINAL: No abdominal or epigastric pain. No nausea, vomiting, or hematemesis; No diarrhea  No melena or hematochezia.  GENITOURINARY: No dysuria, frequency, hematuria, or incontinence  NEUROLOGICAL: No headaches  SKIN: No itching,  no   rash  LYMPH Nodes: No enlarged glands  ENDOCRINE: No heat or cold intolerance  MUSCULOSKELETAL: No joint pain or swelling  PSYCHIATRIC: No depression, anxiety  HEME/LYMPH: No easy bruising, or bleeding gums  ALLERGY AND IMMUNOLOGIC: No hives or eczema

## 2024-01-03 NOTE — PROGRESS NOTE ADULT - SUBJECTIVE AND OBJECTIVE BOX
date of service: 01-03-24 @ 08:56    REVIEW OF SYSTEMS:  CONSTITUTIONAL: No fever,  no  weight loss  ENT:  No  tinnitus,   no   vertigo  NECK: No pain or stiffness  RESPIRATORY: No cough, wheezing, chills or hemoptysis;    No Shortness of Breath  CARDIOVASCULAR: No chest pain, palpitations, dizziness  GASTROINTESTINAL: No abdominal or epigastric pain. No nausea, vomiting, or hematemesis; No diarrhea  No melena or hematochezia.  GENITOURINARY: No dysuria, frequency, hematuria, or incontinence  NEUROLOGICAL: No headaches  SKIN: No itching,  no   rash  LYMPH Nodes: No enlarged glands  ENDOCRINE: No heat or cold intolerance  MUSCULOSKELETAL: No joint pain or swelling  PSYCHIATRIC: No depression, anxiety  HEME/LYMPH: No easy bruising, or bleeding gums  ALLERGY AND IMMUNOLOGIC: No hives or eczema	    MEDICATIONS  (STANDING):  aspirin enteric coated 81 milliGRAM(s) Oral daily  atorvastatin 40 milliGRAM(s) Oral at bedtime  heparin   Injectable 5000 Unit(s) SubCutaneous every 12 hours  levothyroxine 75 MICROGram(s) Oral daily  oseltamivir 30 milliGRAM(s) Oral two times a day  pantoprazole    Tablet 40 milliGRAM(s) Oral before breakfast  senna 2 Tablet(s) Oral at bedtime    MEDICATIONS  (PRN):  oxyCODONE    IR 2.5 milliGRAM(s) Oral every 8 hours PRN Moderate Pain (4 - 6)      Vital Signs Last 24 Hrs  T(C): 36.7 (03 Jan 2024 05:34), Max: 36.8 (02 Jan 2024 20:53)  T(F): 98.1 (03 Jan 2024 05:34), Max: 98.3 (02 Jan 2024 20:53)  HR: 73 (03 Jan 2024 05:34) (73 - 76)  BP: 139/79 (03 Jan 2024 05:34) (129/77 - 142/83)  BP(mean): --  RR: 16 (03 Jan 2024 05:34) (16 - 18)  SpO2: 93% (03 Jan 2024 05:34) (93% - 94%)    Parameters below as of 03 Jan 2024 05:34  Patient On (Oxygen Delivery Method): room air      CAPILLARY BLOOD GLUCOSE        I&O's Summary    02 Jan 2024 07:01  -  03 Jan 2024 07:00  --------------------------------------------------------  IN: 360 mL / OUT: 220 mL / NET: 140 mL          Appearance: Normal	  HEENT:   Normal oral mucosa, PERRL, EOMI	  Lymphatic: No lymphadenopathy  Cardiovascular: Normal S1 S2, No JVD  Respiratory: Lungs clear to auscultation	  Gastrointestinal:  Soft, Non-tender, + BS	  Skin: No rash, No ecchymoses	  Extremities:     LABS:                          Thyroid Stimulating Hormone, Serum: 2.72 uIU/mL (12-31 @ 07:27)          Consultant(s) Notes Reviewed:      Care Discussed with Consultants/Other Providers:

## 2024-01-03 NOTE — PROGRESS NOTE ADULT - SUBJECTIVE AND OBJECTIVE BOX
Neurology        S: jose seen. doing okay        Medications:     Medications: MEDICATIONS  (STANDING):  aspirin enteric coated 81 milliGRAM(s) Oral daily  atorvastatin 40 milliGRAM(s) Oral at bedtime  ciprofloxacin     Tablet 250 milliGRAM(s) Oral two times a day  heparin   Injectable 5000 Unit(s) SubCutaneous every 12 hours  levothyroxine 75 MICROGram(s) Oral daily  oseltamivir 30 milliGRAM(s) Oral two times a day  pantoprazole    Tablet 40 milliGRAM(s) Oral before breakfast  senna 2 Tablet(s) Oral at bedtime    MEDICATIONS  (PRN):  oxyCODONE    IR 2.5 milliGRAM(s) Oral two times a day PRN Moderate Pain (4 - 6)       Vitals:  Vital Signs Last 24 Hrs  T(C): 36.7 (03 Jan 2024 05:34), Max: 36.8 (02 Jan 2024 20:53)  T(F): 98.1 (03 Jan 2024 05:34), Max: 98.3 (02 Jan 2024 20:53)  HR: 73 (03 Jan 2024 05:34) (73 - 76)  BP: 139/79 (03 Jan 2024 05:34) (129/77 - 142/83)  BP(mean): --  RR: 16 (03 Jan 2024 05:34) (16 - 18)  SpO2: 93% (03 Jan 2024 05:34) (93% - 94%)    Parameters below as of 03 Jan 2024 05:34  Patient On (Oxygen Delivery Method): room air            General Exam:   General Appearance: Appropriately dressed and in no acute distress       Head: Normocephalic, atraumatic and no dysmorphic features  Ear, Nose, and Throat: Moist mucous membranes  CVS: S1S2+  Resp: No SOB, no wheeze or rhonchi  GI: soft NT/ND  Extremities: No edema or cyanosis  Skin: No bruises or rashes     Neurological Exam:  Mental Status: Awake, alert and oriented x 1.  Able to follow simple verbal commands. Able to name and repeat. fluent speech. No obvious aphasia or dysarthria noted.   Cranial Nerves: PERRL, EOMI, VFFC, sensation V1-V3 intact,  no obvious facial asymmetry, equal elevation of palate, scm/trap 5/5, tongue is midline on protrusion. no obvious papilledema on fundoscopic exam. very hard of hearing   Motor:  LOWE at least 4/5 throuhgout   Sensation: Intact to light touch and pinprick throughout. no right/left confusion. no extinction to tactile on DSS. Romberg was negative.   Reflexes: 1+ throughout at biceps, brachioradialis, triceps, patellars and ankles bilaterally and equal. No clonus. R toe and L toe were both downgoing.  Coordination: No dysmetria on FNF    Gait: deferred     Data/Labs/Imaging which I personally reviewed.     Labs:   no new labs                  < from: CT Head No Cont (12.29.23 @ 11:12) >    ACC: [id number] EXAM:  CT BRAIN   ORDERED BY:  ROQUE CALABRESE     PROCEDURE DATE:  12/29/2023          INTERPRETATION:  CT head without IV contrast    CLINICAL INFORMATION:     Syncope and fall unwitnessed  MCT     INTRACRANIAL HEMORRHAGE.  INJURY.   TRAUMA.  ENCOUNTER / stage of care for trauma patient:     Initial    TECHNIQUE:  Contiguous axial 3 mm thick images were acquired.  This data   set was reconstructed in the sagittal and coronal planes.  Contrast was   not administered for this examination.  Note:   Artifact from gross   patient motion causes image blurring and limits evaluation.  Images were   repeated because of patient motion.  CONTRAST:    None  DOSE INFORMATION:   This scan was performed using automatic exposure   control (radiation dose reduction software) to obtain a diagnostic image   quality scan with patient dose as low as reasonably achievable.   Total   DLP for this examination is estimated at 623 mGy*cm.    COMPARISON:   CT head 2021 available for review.    FINDINGS:    BRAIN:    The brain  demonstrates a small focal lesion of remote deep   infarction at the left caudate nucleus head neck junction region. No   cerebral cortical lesion has developed.   Cerebral cortical gray-white   matter differentiation is maintained.  No acute cerebral cortical infarct   is seen.  No intracranial hemorrhage is found.  No mass effect is found   in the brain.    CSF SPACES:  The ventricles, sulci and basal cisterns appear appear   mildly dilated reflecting diffuse brain volume loss. dilated reflecting   diffuse brain volume loss.   No differential cerebral cortical atrophy is   recognized.  However, there is differential dilatation of the temporal   horns of the lateral ventricles associated with differential hippocampal   formation atrophy.    VESSELS: Intracranial vasculature is also significant for atherosclerotic   calcifications within the cavernous segments of the internal carotid   arteries.    HEAD AND NECK STRUCTURES:  The orbits are unremarkable.  The included   paranasal sinuses  demonstrate mucosal opacification of the right   sphenoid sinus. 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  The nasopharynx is symmetric.    The central skull base is intact.  The temporal bones demonstrate patent   petrous air cells.  The calvarium appears unremarkable.      IMPRESSION:    1. 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 No evidence of acute intracranial injury.    --- End of Report ---            EDUARDO KINSEY MD; Attending Radiologist  This document has been electronically signed. Dec 29 2023 11:39AM    < end of copied text >           Neurology        S: jose seen. doing okay        Medications:     Medications: MEDICATIONS  (STANDING):  aspirin enteric coated 81 milliGRAM(s) Oral daily  atorvastatin 40 milliGRAM(s) Oral at bedtime  ciprofloxacin     Tablet 250 milliGRAM(s) Oral two times a day  heparin   Injectable 5000 Unit(s) SubCutaneous every 12 hours  levothyroxine 75 MICROGram(s) Oral daily  oseltamivir 30 milliGRAM(s) Oral two times a day  pantoprazole    Tablet 40 milliGRAM(s) Oral before breakfast  senna 2 Tablet(s) Oral at bedtime    MEDICATIONS  (PRN):  oxyCODONE    IR 2.5 milliGRAM(s) Oral two times a day PRN Moderate Pain (4 - 6)       Vitals:  Vital Signs Last 24 Hrs  T(C): 36.7 (03 Jan 2024 05:34), Max: 36.8 (02 Jan 2024 20:53)  T(F): 98.1 (03 Jan 2024 05:34), Max: 98.3 (02 Jan 2024 20:53)  HR: 73 (03 Jan 2024 05:34) (73 - 76)  BP: 139/79 (03 Jan 2024 05:34) (129/77 - 142/83)  BP(mean): --  RR: 16 (03 Jan 2024 05:34) (16 - 18)  SpO2: 93% (03 Jan 2024 05:34) (93% - 94%)    Parameters below as of 03 Jan 2024 05:34  Patient On (Oxygen Delivery Method): room air            General Exam:   General Appearance: Appropriately dressed and in no acute distress       Head: Normocephalic, atraumatic and no dysmorphic features  Ear, Nose, and Throat: Moist mucous membranes  CVS: S1S2+  Resp: No SOB, no wheeze or rhonchi  GI: soft NT/ND  Extremities: No edema or cyanosis  Skin: No bruises or rashes     Neurological Exam:  Mental Status: Awake, alert and oriented x 1.  Able to follow simple verbal commands. Able to name and repeat. fluent speech. No obvious aphasia or dysarthria noted.   Cranial Nerves: PERRL, EOMI, VFFC, sensation V1-V3 intact,  no obvious facial asymmetry, equal elevation of palate, scm/trap 5/5, tongue is midline on protrusion. no obvious papilledema on fundoscopic exam. very hard of hearing   Motor:  LOWE at least 4/5 throuhgout   Sensation: Intact to light touch and pinprick throughout. no right/left confusion. no extinction to tactile on DSS. Romberg was negative.   Reflexes: 1+ throughout at biceps, brachioradialis, triceps, patellars and ankles bilaterally and equal. No clonus. R toe and L toe were both downgoing.  Coordination: No dysmetria on FNF    Gait: deferred     Data/Labs/Imaging which I personally reviewed.     Labs:   no new labs                  < from: CT Head No Cont (12.29.23 @ 11:12) >    ACC: [id number] EXAM:  CT BRAIN   ORDERED BY:  ROQUE CALABRESE     PROCEDURE DATE:  12/29/2023          INTERPRETATION:  CT head without IV contrast    CLINICAL INFORMATION:     Syncope and fall unwitnessed  MCT     INTRACRANIAL HEMORRHAGE.  INJURY.   TRAUMA.  ENCOUNTER / stage of care for trauma patient:     Initial    TECHNIQUE:  Contiguous axial 3 mm thick images were acquired.  This data   set was reconstructed in the sagittal and coronal planes.  Contrast was   not administered for this examination.  Note:   Artifact from gross   patient motion causes image blurring and limits evaluation.  Images were   repeated because of patient motion.  CONTRAST:    None  DOSE INFORMATION:   This scan was performed using automatic exposure   control (radiation dose reduction software) to obtain a diagnostic image   quality scan with patient dose as low as reasonably achievable.   Total   DLP for this examination is estimated at 623 mGy*cm.    COMPARISON:   CT head 2021 available for review.    FINDINGS:    BRAIN:    The brain  demonstrates a small focal lesion of remote deep   infarction at the left caudate nucleus head neck junction region. No   cerebral cortical lesion has developed.   Cerebral cortical gray-white   matter differentiation is maintained.  No acute cerebral cortical infarct   is seen.  No intracranial hemorrhage is found.  No mass effect is found   in the brain.    CSF SPACES:  The ventricles, sulci and basal cisterns appear appear   mildly dilated reflecting diffuse brain volume loss. dilated reflecting   diffuse brain volume loss.   No differential cerebral cortical atrophy is   recognized.  However, there is differential dilatation of the temporal   horns of the lateral ventricles associated with differential hippocampal   formation atrophy.    VESSELS: Intracranial vasculature is also significant for atherosclerotic   calcifications within the cavernous segments of the internal carotid   arteries.    HEAD AND NECK STRUCTURES:  The orbits are unremarkable.  The included   paranasal sinuses  demonstrate mucosal opacification of the right   sphenoid sinus. 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  The nasopharynx is symmetric.    The central skull base is intact.  The temporal bones demonstrate patent   petrous air cells.  The calvarium appears unremarkable.      IMPRESSION:    1. 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 No evidence of acute intracranial injury.    --- End of Report ---            EDUARDO KINSEY MD; Attending Radiologist  This document has been electronically signed. Dec 29 2023 11:39AM    < end of copied text >

## 2024-01-03 NOTE — CHART NOTE - NSCHARTNOTEFT_GEN_A_CORE
Request from Dr. Snow to facilitate patient discharge. Medication reconciliation reviewed, revised, and resolved with Dr. Snow who had medically cleared patient for discharge with follow-up as advised. Please refer to discharge note for detailed hospital course. Patient is currently stable for discharge to home at this time.    Nyla Eldridge PA-C  Dept of Medicine  Contact #[phone] Request from Dr. Snow to facilitate patient discharge. Medication reconciliation reviewed, revised, and resolved with Dr. Snow who had medically cleared patient for discharge with follow-up as advised. Please refer to discharge note for detailed hospital course. Patient is currently stable for discharge to home at this time.    Nyla Eldridge PA-C  Dept of Medicine  Contact #[phone] Request from Dr. Snow to facilitate patient discharge. Medication reconciliation reviewed, revised, and resolved with Dr. Snow who had medically cleared patient for discharge with follow-up as advised. Please refer to discharge note for detailed hospital course. Patient is currently stable for discharge to home at this time.    iSTOP:  Confidential Drug Utilization Report  Search Terms: Danielle Clark, 1935 Search Date: 01/03/2024 12:31:36 PM  The Drug Utilization Report below displays all of the controlled substance prescriptions, if any, that your patient has filled in the last twelve months. The information displayed on this report is compiled from pharmacy submissions to the Department, and accurately reflects the information as submitted by the pharmacies.    This report was requested by: Nyla Eldridge | Reference #: [id number]    There are no results for the search terms that you entered.    Nyla Eldridge PA-C  Dept of Medicine  Contact #[phone] Request from Dr. Snow to facilitate patient discharge. Medication reconciliation reviewed, revised, and resolved with Dr. Snow who had medically cleared patient for discharge with follow-up as advised. Please refer to discharge note for detailed hospital course. Patient is currently stable for discharge to home at this time.    iSTOP:  Confidential Drug Utilization Report  Search Terms: Danielle Clark, 1935 Search Date: 01/03/2024 12:31:36 PM  The Drug Utilization Report below displays all of the controlled substance prescriptions, if any, that your patient has filled in the last twelve months. The information displayed on this report is compiled from pharmacy submissions to the Department, and accurately reflects the information as submitted by the pharmacies.    This report was requested by: Nyla Eldridge | Reference #: [id number]    There are no results for the search terms that you entered.    Nyla Eldridge PA-C  Dept of Medicine  Contact #[phone]

## 2024-01-03 NOTE — CHART NOTE - NSCHARTNOTEFT_GEN_A_CORE
Patient requires a bedside commode for d/c home due to generalized weakness and deconditioning - patient is at risk to fall and cannot walk safely to the bathroom.    Nyla Eldridge PA-C  Dept of Medicine   [id number] Patient requires a bedside commode for d/c home due to generalized weakness and deconditioning - patient is at risk to fall and cannot walk safely to the bathroom.    Nyla Eldridge PA-C  Dept of Medicine   [id number] Patient requires a commode at home due to generalized weakness and deconditioning.   Patient is confined to a single room with no toilet in that room     Nyla Eldridge PA-C  Dept of Medicine   [id number] Patient requires a commode at home due to generalized weakness and deconditioning.   Patient is confined to a single room with no toilet in that room     Nyla Eldridge PA-C  Dept of Medicine   [id number]

## 2024-01-03 NOTE — DISCHARGE NOTE NURSING/CASE MANAGEMENT/SOCIAL WORK - PATIENT PORTAL LINK FT
You can access the FollowMyHealth Patient Portal offered by [facility] by registering at the following website: [email or web address]. By joining [employer]’s FollowMyHealth portal, you will also be able to view your health information using other applications (apps) compatible with our system. You can access the FollowMyHealth Patient Portal offered by [facility] by registering at the following website: [email or web address]. By joining [employer]’s FollowMyHealth portal, you will also be able to view your health information using other applications (apps) compatible with our system.

## 2024-01-03 NOTE — ADVANCED PRACTICE NURSE CONSULT - ASSESSMENT
arrived on unit, patient was found lying in a low air loss pressure redistribution support surface style bed.  patient  is unable to turn independently patient refused to turn to view her skin. patient  was educated  on the importance  for turning  and positioning  every 2 hours. The use of waffle cushion when out of bed  to chair,  and to shift her Weight every 2 hours while in chair. The importance a keeping her  skin clean and dry and  to offload left  heel/ foot,  and optimal nutrition. covering RN was made aware patient refused wound care consult.

## 2024-01-03 NOTE — DISCHARGE NOTE NURSING/CASE MANAGEMENT/SOCIAL WORK - NSDCPEFALRISK_GEN_ALL_CORE
For information on Fall & Injury Prevention, visit: [email or web address] OR  [email or web address] OR  https://www.cdc.gov/steadi/patient.html For information on Fall & Injury Prevention, visit: [email or web address] OR  [email or web address] OR  https://www.cdc.gov/steadi/patient.html

## 2024-01-04 VITALS
DIASTOLIC BLOOD PRESSURE: 77 MMHG | RESPIRATION RATE: 17 BRPM | TEMPERATURE: 97 F | HEART RATE: 70 BPM | OXYGEN SATURATION: 92 % | SYSTOLIC BLOOD PRESSURE: 125 MMHG

## 2024-01-04 LAB
SARS-COV-2 RNA SPEC QL NAA+PROBE: SIGNIFICANT CHANGE UP
SARS-COV-2 RNA SPEC QL NAA+PROBE: SIGNIFICANT CHANGE UP

## 2024-01-04 PROCEDURE — 87637 SARSCOV2&INF A&B&RSV AMP PRB: CPT

## 2024-01-04 PROCEDURE — 71045 X-RAY EXAM CHEST 1 VIEW: CPT

## 2024-01-04 PROCEDURE — 73562 X-RAY EXAM OF KNEE 3: CPT

## 2024-01-04 PROCEDURE — 76377 3D RENDER W/INTRP POSTPROCES: CPT

## 2024-01-04 PROCEDURE — 80048 BASIC METABOLIC PNL TOTAL CA: CPT

## 2024-01-04 PROCEDURE — 81001 URINALYSIS AUTO W/SCOPE: CPT

## 2024-01-04 PROCEDURE — 73502 X-RAY EXAM HIP UNI 2-3 VIEWS: CPT

## 2024-01-04 PROCEDURE — 87086 URINE CULTURE/COLONY COUNT: CPT

## 2024-01-04 PROCEDURE — 87186 SC STD MICRODIL/AGAR DIL: CPT

## 2024-01-04 PROCEDURE — 72170 X-RAY EXAM OF PELVIS: CPT

## 2024-01-04 PROCEDURE — 87635 SARS-COV-2 COVID-19 AMP PRB: CPT

## 2024-01-04 PROCEDURE — 84484 ASSAY OF TROPONIN QUANT: CPT

## 2024-01-04 PROCEDURE — 84443 ASSAY THYROID STIM HORMONE: CPT

## 2024-01-04 PROCEDURE — 97162 PT EVAL MOD COMPLEX 30 MIN: CPT

## 2024-01-04 PROCEDURE — 70450 CT HEAD/BRAIN W/O DYE: CPT | Mod: MA

## 2024-01-04 PROCEDURE — 72192 CT PELVIS W/O DYE: CPT | Mod: MA

## 2024-01-04 PROCEDURE — 73552 X-RAY EXAM OF FEMUR 2/>: CPT

## 2024-01-04 PROCEDURE — 83735 ASSAY OF MAGNESIUM: CPT

## 2024-01-04 PROCEDURE — 36415 COLL VENOUS BLD VENIPUNCTURE: CPT

## 2024-01-04 PROCEDURE — 99285 EMERGENCY DEPT VISIT HI MDM: CPT | Mod: 25

## 2024-01-04 PROCEDURE — 85027 COMPLETE CBC AUTOMATED: CPT

## 2024-01-04 PROCEDURE — 80053 COMPREHEN METABOLIC PANEL: CPT

## 2024-01-04 PROCEDURE — 85025 COMPLETE CBC W/AUTO DIFF WBC: CPT

## 2024-01-04 PROCEDURE — 87077 CULTURE AEROBIC IDENTIFY: CPT

## 2024-01-04 RX ORDER — LEVOTHYROXINE SODIUM 125 MCG
1 TABLET ORAL
Qty: 0 | Refills: 0 | DISCHARGE

## 2024-01-04 RX ORDER — CIPROFLOXACIN LACTATE 400MG/40ML
1 VIAL (ML) INTRAVENOUS
Qty: 4 | Refills: 0
Start: 2024-01-04 | End: 2024-01-06

## 2024-01-04 RX ADMIN — OXYCODONE HYDROCHLORIDE 2.5 MILLIGRAM(S): 5 TABLET ORAL at 12:34

## 2024-01-04 RX ADMIN — OXYCODONE HYDROCHLORIDE 2.5 MILLIGRAM(S): 5 TABLET ORAL at 11:34

## 2024-01-04 RX ADMIN — Medication 81 MILLIGRAM(S): at 11:34

## 2024-01-04 RX ADMIN — PANTOPRAZOLE SODIUM 40 MILLIGRAM(S): 20 TABLET, DELAYED RELEASE ORAL at 05:20

## 2024-01-04 RX ADMIN — Medication 75 MICROGRAM(S): at 05:20

## 2024-01-04 RX ADMIN — Medication 250 MILLIGRAM(S): at 05:19

## 2024-01-04 RX ADMIN — OXYCODONE HYDROCHLORIDE 2.5 MILLIGRAM(S): 5 TABLET ORAL at 06:09

## 2024-01-04 RX ADMIN — Medication 250 MILLIGRAM(S): at 17:41

## 2024-01-04 RX ADMIN — HEPARIN SODIUM 5000 UNIT(S): 5000 INJECTION INTRAVENOUS; SUBCUTANEOUS at 05:19

## 2024-01-04 RX ADMIN — HEPARIN SODIUM 5000 UNIT(S): 5000 INJECTION INTRAVENOUS; SUBCUTANEOUS at 17:42

## 2024-01-04 NOTE — PROGRESS NOTE ADULT - ASSESSMENT
88 year old female      h/o  hypothyroidism, arthritis bilat hip replacements /  olecranon fx/ prior h/o  dizziness      arrives  via EMS after unwitnessed fall this morning.       pt  lives alone /  woke up at 3am which is normal for her. . was eating breakfast and then she remembers finding herself on the ground.     denies  dizziness, palpitations, chest pain/sob/ abd pain      pain in the left lower extremity ./   has  difficulty  in ambulation since  syncopal event/  xrays  in er,  no fx      syncope  / collapse /  cause   unknown/  suspect  from  influenza     influenza.   on tamiflu   Raunaud;s  disease/ fingers   Ct   head,  left b/ ganglia infarct/ not reported  as  acute       on asa/  lipitor   Hypothyroid     h/o b/l hip surg, in the past    xrays, no fx/  has  rom  of  b/l legs/ has difficulty on ambulation,  from hip  pain,  s/p  collapse   has no abd pain     CT  pelvis,  thickened   sigmoid  colon/ with ?  fistula/ ?  mass     on dvt ppx /   fall risk     ct  head. 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  mri   dept  made  aware  by  np    per house  gi.  will  need  colonoscopy    and  per  hcp,  does  not   want  any  intervention / testing / and  is  aware  that  pt  may  have  a  malignant   process     uti, Proteus,  on  cipro   neuro  dr danielson..   and   hcp  does  not wnat mri/  carotid  dopplers  per  PT,   rehab was  recommended/  hcp  wants  home    d/c  plans   to home . yesterday.     awaiting   d/c        hcp  wants  home  as dispo    lives  alone. has  no children/   and is  full  code    d[place] Churchill  is  hcp/          ad< from: CT 3D Reconstruct w/ Workstation (12.29.23 @ 11:11) >  IMPRESSION:  1.  No fractures are seen.  2.  Long segment of mild to moderate wall thickening involves the upper   sigmoid colon with a soft tissue tract extending to the mid sigmoid   colon. These changes could reflect scarring fromprior inflammation and a   fistulous tract. Neoplasm cannot be absolutely excluded.  3.  Correlation with other prior imaging or prior colonoscopy is   suggested if available. Follow-up CT of the abdomen and pelvis with oral   and/or rectal contrast may be helpful to assess for a fistula. Consider   colonoscopy as warranted.  --- End of Report ---      rad< from: CT Head No Cont (12.29.23 @ 11:12) >  IMPRESSION:  1. 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 No evidence of acute intracranial injury.  --- End of Report ---            <  88 year old female      h/o  hypothyroidism, arthritis bilat hip replacements /  olecranon fx/ prior h/o  dizziness      arrives  via EMS after unwitnessed fall this morning.       pt  lives alone /  woke up at 3am which is normal for her. . was eating breakfast and then she remembers finding herself on the ground.     denies  dizziness, palpitations, chest pain/sob/ abd pain      pain in the left lower extremity ./   has  difficulty  in ambulation since  syncopal event/  xrays  in er,  no fx      syncope  / collapse /  cause   unknown/  suspect  from  influenza     influenza.   on tamiflu   Raunaud;s  disease/ fingers   Ct   head,  left b/ ganglia infarct/ not reported  as  acute       on asa/  lipitor   Hypothyroid     h/o b/l hip surg, in the past    xrays, no fx/  has  rom  of  b/l legs/ has difficulty on ambulation,  from hip  pain,  s/p  collapse   has no abd pain     CT  pelvis,  thickened   sigmoid  colon/ with ?  fistula/ ?  mass     on dvt ppx /   fall risk     ct  head. 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  mri   dept  made  aware  by  np    per house  gi.  will  need  colonoscopy    and  per  hcp,  does  not   want  any  intervention / testing / and  is  aware  that  pt  may  have  a  malignant   process     uti, Proteus,  on  cipro   neuro  dr danielson..   and   hcp  does  not wnat mri/  carotid  dopplers  per  PT,   rehab was  recommended/  hcp  wants  home    d/c  plans   to home . yesterday.      pt  still  here       upon speaking  with    dallas. , this  am, who  stated , that ems   brought pt  home , and  then  pt refused to go  on her  bed/ remained  seated  in her  chair,  and then  was  belligerent  per  dallas,   hence  ems   brought pt   back to  hosp   pe r d[place],  now  wants  rehab      hcp  wants  home  as dispo    lives  alone. has  no children/   and is  full  code    dallas Churchill  is  hcp/          ad< from: CT 3D Reconstruct w/ Workstation (12.29.23 @ 11:11) >  IMPRESSION:  1.  No fractures are seen.  2.  Long segment of mild to moderate wall thickening involves the upper   sigmoid colon with a soft tissue tract extending to the mid sigmoid   colon. These changes could reflect scarring fromprior inflammation and a   fistulous tract. Neoplasm cannot be absolutely excluded.  3.  Correlation with other prior imaging or prior colonoscopy is   suggested if available. Follow-up CT of the abdomen and pelvis with oral   and/or rectal contrast may be helpful to assess for a fistula. Consider   colonoscopy as warranted.  --- End of Report ---      rad< from: CT Head No Cont (12.29.23 @ 11:12) >  IMPRESSION:  1. 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 No evidence of acute intracranial injury.  --- End of Report ---            <  88 year old female      h/o  hypothyroidism, arthritis bilat hip replacements /  olecranon fx/ prior h/o  dizziness      arrives  via EMS after unwitnessed fall this morning.       pt  lives alone /  woke up at 3am which is normal for her. . was eating breakfast and then she remembers finding herself on the ground.     denies  dizziness, palpitations, chest pain/sob/ abd pain      pain in the left lower extremity ./   has  difficulty  in ambulation since  syncopal event/  xrays  in er,  no fx      syncope  / collapse /  cause   unknown/  suspect  from  influenza     influenza.   on tamiflu   Raunaud;s  disease/ fingers   Ct   head,  left b/ ganglia infarct/ not reported  as  acute       on asa/  lipitor   Hypothyroid     h/o b/l hip surg, in the past    xrays, no fx/  has  rom  of  b/l legs/ has difficulty on ambulation,  from hip  pain,  s/p  collapse   has no abd pain     CT  pelvis,  thickened   sigmoid  colon/ with ?  fistula/ ?  mass     on dvt ppx /   fall risk     ct  head. 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  mri   dept  made  aware  by  np    per house  gi.  will  need  colonoscopy    and  per  hcp,  does  not   want  any  intervention / testing / and  is  aware  that  pt  may  have  a  malignant   process     uti, Proteus,  on  cipro   neuro  dr danielson..   and   hcp  does  not wnat mri/  carotid  dopplers  per  PT,   rehab was  recommended/  hcp  wants  home    d/c  plans   to home . yesterday. , was  the  plan     pt  still  here       upon speaking  with    dallas. , this  am,   found  out taht who  stated , that ems   brought pt  home , and  then  pt refused to go  on her  bed/ remained  seated  in her  chair,  and then  was  belligerent  per  dallas,   hence  ems   brought pt   back to  hosp   per dallas,  now  wants  rehab      hcp  wants  home  as dispo    lives  alone. has  no children/   and is  full  code    dallas Churchill  is  hcp/          ad< from: CT 3D Reconstruct w/ Workstation (12.29.23 @ 11:11) >  IMPRESSION:  1.  No fractures are seen.  2.  Long segment of mild to moderate wall thickening involves the upper   sigmoid colon with a soft tissue tract extending to the mid sigmoid   colon. These changes could reflect scarring fromprior inflammation and a   fistulous tract. Neoplasm cannot be absolutely excluded.  3.  Correlation with other prior imaging or prior colonoscopy is   suggested if available. Follow-up CT of the abdomen and pelvis with oral   and/or rectal contrast may be helpful to assess for a fistula. Consider   colonoscopy as warranted.  --- End of Report ---      rad< from: CT Head No Cont (12.29.23 @ 11:12) >  IMPRESSION:  1. 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 No evidence of acute intracranial injury.  --- End of Report ---            <

## 2024-01-04 NOTE — PROGRESS NOTE ADULT - SUBJECTIVE AND OBJECTIVE BOX
date of service: 01-04-24 @ 09:14  afbrile  REVIEW OF SYSTEMS:  CONSTITUTIONAL: No fever,  no  weight loss  ENT:  No  tinnitus,   no   vertigo  NECK: No pain or stiffness  RESPIRATORY: No cough, wheezing, chills or hemoptysis;    No Shortness of Breath  CARDIOVASCULAR: No chest pain, palpitations, dizziness  GASTROINTESTINAL: No abdominal or epigastric pain. No nausea, vomiting, or hematemesis; No diarrhea  No melena or hematochezia.  GENITOURINARY: No dysuria, frequency, hematuria, or incontinence  NEUROLOGICAL: No headaches  SKIN: No itching,  no   rash  LYMPH Nodes: No enlarged glands  ENDOCRINE: No heat or cold intolerance  MUSCULOSKELETAL: No joint pain or swelling  PSYCHIATRIC: No depression, anxiety  HEME/LYMPH: No easy bruising, or bleeding gums  ALLERGY AND IMMUNOLOGIC: No hives or eczema	    MEDICATIONS  (STANDING):  aspirin enteric coated 81 milliGRAM(s) Oral daily  atorvastatin 40 milliGRAM(s) Oral at bedtime  ciprofloxacin     Tablet 250 milliGRAM(s) Oral two times a day  heparin   Injectable 5000 Unit(s) SubCutaneous every 12 hours  levothyroxine 75 MICROGram(s) Oral daily  pantoprazole    Tablet 40 milliGRAM(s) Oral before breakfast  senna 2 Tablet(s) Oral at bedtime    MEDICATIONS  (PRN):  oxyCODONE    IR 2.5 milliGRAM(s) Oral two times a day PRN Moderate Pain (4 - 6)      Vital Signs Last 24 Hrs  T(C): 37.1 (04 Jan 2024 04:11), Max: 37.1 (04 Jan 2024 04:11)  T(F): 98.7 (04 Jan 2024 04:11), Max: 98.7 (04 Jan 2024 04:11)  HR: 89 (04 Jan 2024 04:11) (72 - 98)  BP: 143/82 (04 Jan 2024 04:11) (143/82 - 157/76)  BP(mean): --  RR: 17 (04 Jan 2024 04:11) (17 - 17)  SpO2: 92% (04 Jan 2024 04:11) (92% - 94%)    Parameters below as of 04 Jan 2024 04:11  Patient On (Oxygen Delivery Method): room air      CAPILLARY BLOOD GLUCOSE        I&O's Summary        Appearance: Normal	  HEENT:   Normal oral mucosa, PERRL, EOMI	  Lymphatic: No lymphadenopathy  Cardiovascular: Normal S1 S2, No JVD  Respiratory: Lungs clear to auscultation	  Gastrointestinal:  Soft, Non-tender, + BS	  Skin: No rash, No ecchymoses	  Extremities:     LABS:                          Thyroid Stimulating Hormone, Serum: 2.72 uIU/mL (12-31 @ 07:27)          Consultant(s) Notes Reviewed:      Care Discussed with Consultants/Other Providers:

## 2024-01-04 NOTE — PROGRESS NOTE ADULT - REASON FOR ADMISSION
fall/ syncope

## 2024-01-04 NOTE — PROGRESS NOTE ADULT - PROVIDER SPECIALTY LIST ADULT
Cardiology
Internal Medicine
Podiatry
Internal Medicine
Internal Medicine
Cardiology
Neurology
Cardiology
Cardiology
Internal Medicine
Neurology
Internal Medicine
Internal Medicine

## 2024-01-04 NOTE — PROGRESS NOTE ADULT - SUBJECTIVE AND OBJECTIVE BOX
Date of Service: 01-04-24 @ 09:29           CARDIOLOGY     PROGRESS  NOTE   ________________________________________________    CHIEF COMPLAINT:Patient is a 88y old  Female who presents with a chief complaint of fall/ syncope (04 Jan 2024 09:14)  pt was brought back  after dc  	  REVIEW OF SYSTEMS:  CONSTITUTIONAL: No fever, weight loss, or fatigue  EYES: No eye pain, visual disturbances, or discharge  ENT:  No difficulty hearing, tinnitus, vertigo; No sinus or throat pain  NECK: No pain or stiffness  RESPIRATORY: No cough, wheezing, chills or hemoptysis; No Shortness of Breath  CARDIOVASCULAR: No chest pain, palpitations, passing out, dizziness, or leg swelling  GASTROINTESTINAL: No abdominal or epigastric pain. No nausea, vomiting, or hematemesis; No diarrhea or constipation. No melena or hematochezia.  GENITOURINARY: No dysuria, frequency, hematuria, or incontinence  NEUROLOGICAL: No headaches, memory loss, loss of strength, numbness, or tremors  SKIN: No itching, burning, rashes, or lesions   LYMPH Nodes: No enlarged glands  ENDOCRINE: No heat or cold intolerance; No hair loss  MUSCULOSKELETAL: No joint pain or swelling; No muscle, back, or extremity pain  PSYCHIATRIC: No depression, anxiety, mood swings, or difficulty sleeping  HEME/LYMPH: No easy bruising, or bleeding gums  ALLERGY AND IMMUNOLOGIC: No hives or eczema	    [x ] All others negative	  [ ] Unable to obtain    PHYSICAL EXAM:  T(C): 37.1 (01-04-24 @ 04:11), Max: 37.1 (01-04-24 @ 04:11)  HR: 89 (01-04-24 @ 04:11) (72 - 98)  BP: 143/82 (01-04-24 @ 04:11) (143/82 - 157/76)  RR: 17 (01-04-24 @ 04:11) (17 - 17)  SpO2: 92% (01-04-24 @ 04:11) (92% - 94%)  Wt(kg): --  I&O's Summary      Appearance: Normal	  HEENT:   Normal oral mucosa, PERRL, EOMI	  Lymphatic: No lymphadenopathy  Cardiovascular: Normal S1 S2, No JVD, + murmurs, No edema  Respiratory: rhonchi  Psychiatry: A & O x 3, Mood & affect appropriate  Gastrointestinal:  Soft, Non-tender, + BS	  Skin: No rashes, No ecchymoses, No cyanosis	  Extremities: Normal range of motion, No clubbing, cyanosis or edema  Vascular: Peripheral pulses palpable 2+ bilaterally    MEDICATIONS  (STANDING):  aspirin enteric coated 81 milliGRAM(s) Oral daily  atorvastatin 40 milliGRAM(s) Oral at bedtime  ciprofloxacin     Tablet 250 milliGRAM(s) Oral two times a day  heparin   Injectable 5000 Unit(s) SubCutaneous every 12 hours  levothyroxine 75 MICROGram(s) Oral daily  pantoprazole    Tablet 40 milliGRAM(s) Oral before breakfast  senna 2 Tablet(s) Oral at bedtime      TELEMETRY: 	    ECG:  	  RADIOLOGY:  OTHER: 	  	  LABS:	 	    CARDIAC MARKERS:      proBNP:   Lipid Profile:   HgA1c:   TSH: Thyroid Stimulating Hormone, Serum: 2.72 uIU/mL (12-31 @ 07:27)      Assessment and plan  ---------------------------  88 year old female with hx of hypothyroidism, arthritis bilat hip replacements comes into the ED via EMS after unwitnessed fall this morning. Pt lives alone and states she woke up at 3am which is normal for her. Last thing she remembers is eating breakfast and then she remembers finding herself on the ground. She is not on any AC and does not remember having any dizziness, palpitations, chest pain or other symptoms prior to the fall. After waking up she reports having pain in the left lower extremity specifically in the left hip. She also reports she has been having a cough over the past few days. She denies any HA, neck pain, back pain, chest pain, change in vision, abd pain, n/v, urinary symptoms, sensory changes or motor weakness  syncope ?sec to dehydration/ influenza  iv hydration  ct scan noted ?colitis ?ischemia gi has been called  hypothyroid/  tsh  asa daily  started on Tamiflu  check orthostatic  dvt prophylaxis  oob to chair with assistant   GI evaluation noted, check stool for c diff  bp and hr is well controlled  abnormal ecg will check record from Dr Paredes  check orthostatic, noted  gi recommended colonoscopy  ?rehab

## 2024-01-05 NOTE — CHART NOTE - NSCHARTNOTESELECT_GEN_ALL_CORE
Discharge/Event Note
GI fellow/Off Service Note
3 in 1 Commode/Event Note
D/C appt/Event Note
Wound Care Team Note:/Event Note
consulted GI/Event Note

## 2024-03-15 NOTE — ED PROCEDURE NOTE - PROCEDURE SUPERVISED BY
Ania How Many Mls Were Removed From The 80 Mg/Ml (5ml) Vial When Preparing The Injectable Solution?: 0 Consent: The risks of atrophy were reviewed with the patient. Kenalog Type Of Vial: Single Dose Detail Level: Detailed Validate Note Data When Using Inventory: Yes Administered By (Optional): Brett Guerra PA-C Ndc# For Kenalog Only: 5889-9624-54 Total Volume (Ccs): 1.0 Kenalog Preparation: Kenalog Concentration Of Kenalog Solution Injected (Mg/Ml): 3.0 Medical Necessity Clause: This procedure was medically necessary because the lesions that were treated were: causing patient emotional distress Require Ndc Code?: No Show Inventory Tab: Hide

## 2024-04-10 ENCOUNTER — APPOINTMENT (OUTPATIENT)
Dept: HOME HEALTH SERVICES | Facility: HOME HEALTH | Age: 89
End: 2024-04-10
Payer: MEDICARE

## 2024-04-10 VITALS
DIASTOLIC BLOOD PRESSURE: 64 MMHG | TEMPERATURE: 98.2 F | OXYGEN SATURATION: 97 % | RESPIRATION RATE: 18 BRPM | HEART RATE: 75 BPM | SYSTOLIC BLOOD PRESSURE: 104 MMHG

## 2024-04-10 DIAGNOSIS — Z71.89 OTHER SPECIFIED COUNSELING: ICD-10-CM

## 2024-04-10 DIAGNOSIS — Z23 ENCOUNTER FOR IMMUNIZATION: ICD-10-CM

## 2024-04-10 DIAGNOSIS — M15.9 POLYOSTEOARTHRITIS, UNSPECIFIED: ICD-10-CM

## 2024-04-10 DIAGNOSIS — R29.6 REPEATED FALLS: ICD-10-CM

## 2024-04-10 DIAGNOSIS — Z87.898 PERSONAL HISTORY OF OTHER SPECIFIED CONDITIONS: ICD-10-CM

## 2024-04-10 DIAGNOSIS — F32.A DEPRESSION, UNSPECIFIED: ICD-10-CM

## 2024-04-10 PROCEDURE — G0506: CPT

## 2024-04-10 PROCEDURE — 99497 ADVNCD CARE PLAN 30 MIN: CPT

## 2024-04-10 PROCEDURE — 99345 HOME/RES VST NEW HIGH MDM 75: CPT | Mod: 25

## 2024-04-18 ENCOUNTER — LABORATORY RESULT (OUTPATIENT)
Age: 89
End: 2024-04-18

## 2024-04-18 DIAGNOSIS — E55.9 VITAMIN D DEFICIENCY, UNSPECIFIED: ICD-10-CM

## 2024-04-19 ENCOUNTER — NON-APPOINTMENT (OUTPATIENT)
Age: 89
End: 2024-04-19

## 2024-04-19 LAB
25(OH)D3 SERPL-MCNC: 8.6 NG/ML
BASOPHILS # BLD AUTO: 0.02 K/UL
BASOPHILS NFR BLD AUTO: 0.3 %
EOSINOPHIL # BLD AUTO: 0.1 K/UL
EOSINOPHIL NFR BLD AUTO: 1.7 %
ESTIMATED AVERAGE GLUCOSE: 103 MG/DL
HBA1C MFR BLD HPLC: 5.2 %
HCT VFR BLD CALC: 32.7 %
HGB BLD-MCNC: 10.2 G/DL
IMM GRANULOCYTES NFR BLD AUTO: 0.5 %
LYMPHOCYTES # BLD AUTO: 1.61 K/UL
LYMPHOCYTES NFR BLD AUTO: 28.1 %
MAN DIFF?: NORMAL
MCHC RBC-ENTMCNC: 26.6 PG
MCHC RBC-ENTMCNC: 31.2 GM/DL
MCV RBC AUTO: 85.4 FL
MONOCYTES # BLD AUTO: 0.45 K/UL
MONOCYTES NFR BLD AUTO: 7.9 %
NEUTROPHILS # BLD AUTO: 3.52 K/UL
NEUTROPHILS NFR BLD AUTO: 61.5 %
PLATELET # BLD AUTO: 342 K/UL
RBC # BLD: 3.83 M/UL
RBC # FLD: 15.7 %
T4 FREE SERPL-MCNC: 1.4 NG/DL
TSH SERPL-ACNC: 2.02 UIU/ML
WBC # FLD AUTO: 5.73 K/UL

## 2024-04-21 VITALS — WEIGHT: 160 LBS | BODY MASS INDEX: 26.66 KG/M2 | HEIGHT: 65 IN

## 2024-04-21 PROBLEM — R29.6 FREQUENT FALLS: Status: ACTIVE | Noted: 2024-04-16

## 2024-04-21 PROBLEM — F32.A DEPRESSION, UNSPECIFIED DEPRESSION TYPE: Status: ACTIVE | Noted: 2024-04-10

## 2024-04-21 PROBLEM — Z87.898 FORMER CONSUMPTION OF ALCOHOL: Status: ACTIVE | Noted: 2024-04-21

## 2024-04-21 PROBLEM — Z71.89 ACP (ADVANCE CARE PLANNING): Status: ACTIVE | Noted: 2024-04-21

## 2024-04-21 PROBLEM — M15.9 PRIMARY OSTEOARTHRITIS INVOLVING MULTIPLE JOINTS: Status: ACTIVE | Noted: 2024-04-16

## 2024-04-21 PROBLEM — Z23 ENCOUNTER FOR IMMUNIZATION: Status: ACTIVE | Noted: 2024-04-21 | Resolved: 2024-05-05

## 2024-04-21 RX ORDER — LEVOTHYROXINE SODIUM 0.07 MG/1
75 TABLET ORAL
Qty: 90 | Refills: 3 | Status: ACTIVE | COMMUNITY
Start: 2024-04-21 | End: 1900-01-01

## 2024-04-21 RX ORDER — LACTULOSE 10 G/15ML
10 SOLUTION ORAL
Qty: 473 | Refills: 0 | Status: COMPLETED | COMMUNITY
Start: 2024-04-10 | End: 2024-04-21

## 2024-04-21 NOTE — PHYSICAL EXAM
[No Acute Distress] : no acute distress [Normal Sclera/Conjunctiva] : normal sclera/conjunctiva [EOMI] : extra ocular movement intact [Normal Outer Ear/Nose] : the ears and nose were normal in appearance [Normal Oropharynx] : the oropharynx was normal [No Respiratory Distress] : no respiratory distress [Clear to Auscultation] : lungs were clear to auscultation bilaterally [No Accessory Muscle Use] : no accessory muscle use [Normal Rate] : heart rate was normal  [Regular Rhythm] : with a regular rhythm [Normal S1, S2] : normal S1 and S2 [No Murmurs] : no murmurs heard [No Edema] : there was no peripheral edema [Normal Bowel Sounds] : normal bowel sounds [Non Tender] : non-tender [Soft] : abdomen soft [Not Distended] : not distended [Normal Anterior Cervical Nodes] : no anterior cervical lymphadenopathy [Normal Post Cervical Nodes] : no posterior cervical lymphadenopathy [No Spinal Tenderness] : no spinal tenderness [No Rash] : no rash [Normal Affect] : the affect was normal [Normal Mood] : the mood was normal [de-identified] : frail elderly lady laying in bed [de-identified] : diffuse weakness, bedbound [de-identified] : poor memory, no insight/judgement

## 2024-04-21 NOTE — REASON FOR VISIT
[Initial Eval - Existing Diagnosis] : an initial evaluation of an existing diagnosis [Family Member] : family member [Formal Caregiver] : formal caregiver [Pre-Visit Preparation] : pre-visit preparation was done [Intercurrent Specialty/Sub-specialty Visits] : the patient has intercurrent specialty/sub-specialty visits [FreeTextEntry2] : chart review [FreeTextEntry3] : optometry

## 2024-04-21 NOTE — HEALTH RISK ASSESSMENT
[HRA Reviewed] : Health risk assessment reviewed [Some assistance needed] : feeding [Full assistance needed] : managing finances [Any fall with injury in past year] : Patient reported fall with injury in the past year [Yes] : The patient does have visual impairment [TimeGetUpGo] : 0

## 2024-04-21 NOTE — COUNSELING
[Normal Weight - ( BMI  <25 )] : normal weight - ( BMI  <25 ) [Continue diet as tolerated] : continue diet as tolerated based on goals of care [Non - Smoker] : non-smoker [Smoke/CO Detectors] : smoke/CO detectors [Use grab bars] : use grab bars [Remove clutter and unsafe carpeting to avoid falls] : remove clutter and unsafe carpeting to avoid falls [] : abdominal aortic ultrasound [Decrease hospital use] : decrease hospital use [Minimize unnecessary interventions] : minimize unnecessary interventions [Comfort Care] : comfort care [Discussed disease trajectory with patient/caregiver] : discussed disease trajectory with patient/caregiver [Likely to achieve goals/desired outcomes] : likely to achieve goals/desired outcomes [Patient/Caregiver has ___ understanding of disease process] : patient/caregiver has [unfilled] understanding of disease process [Advanced Directives discussed: ____] : Advanced directives discussed: [unfilled] [Completed Medical Orders for Life-Sustaining Treatment] : completed medical orders for life-sustaining treatment [DNR] : Code Status: DNR [Comfort] : Treatment Guidelines: Comfort [DNI] : Intubation: DNI [[facility]: _____[facility]/last verification date: [unfilled] [_____] : HCP: [unfilled] [ - New patient with 2 or more chronic conditions; CCM discussed and patient-centered care plan established] : New patient with 2 or more chronic conditions; CCM discussed and patient-centered care plan established

## 2024-04-21 NOTE — HISTORY OF PRESENT ILLNESS
[Family Member] : family member [FreeTextEntry2] : MARIELENA FAIRCHILD is a 88 year F presenting as a new patient to establish care. Information provided by Kenton Churchill  H:[id number]   Follows with: Dr Cody Kiser - former pcp Optometry   Active medical problems: #Alzheimer's dementia, advanced dependent of all iadls and adls, 24/7 care, bedbound. Sertraline 50mg daily #Hypothyroidism - levothyroxine 75mcg daily #Falls - bedbound now, multiple hospitalization for falls #OA - Tramadol 25mg BID prn (not really using L leg with "fixator"   Recent hospitalizations: Dec 2023 - fall Jan 2021- fall with arm fracture   Past surgical hx: arm fracture repair possible b/l hip replacements?   Medications:  Levothyroxine 75mcg daily sertraline 50mg daily - started in rehab Tramadol 50 half tab twice daily prn - not taking  Vitamin B12 1000mg every other day omeprazole 40mg - not taking Primary Pharmacy: [facility]   Allergies: none   Family history: dementia - sister   Immunizations: no flu shot No PNA shot No vaccines   Vision: pretty good. Uses glasses for distance (TV) and separately for reading Hearing: Very poor, has hearing aides b/l. Cant tell if not hearing or not understanding Gait/Falls/Assisted devices: bedbound, unable to leave the bed- 2 person assist.  Skin: L leg has a lump (was told that it was nothing).  Pain: back and hips Appetite: not a good appetite, but eats. Does not eat veges, meat, chicken, or fish. Only carbs.  BM: Constipated.  Urine: incontinent Sleep: sleeps well.  Mood: Easily agitated, paranoia towards caregivers.    Social: Lives with [facility] 24/7 - private for now. Niece lives nearby ADL: some assistance with feeding, otherwsie dependent IADLs: completely dependent DME: wheelchair (standard), commode, walker, grab bar.  Prior/current profession: / Enjoys: watching TV   Smoking, alcohol, drugs: used to drink alcohol but stopped during recent hospitalization. Smoked many years ago but stopped 30 years ago. No drugs HCP: Kenton Churchill MOLST: DNR/DNI/DNH (except for uncontrolled sxs), no FT or dialysis, yes ivf and abx [FreeTextEntry1] : dementia

## 2024-04-21 NOTE — ASSESSMENT
[FreeTextEntry1] : multivitamin  Miralax, dulcolax labwork mon wean sertarline - start antipsychotic next visit? risperidone?   MARIELENA GURINDER requires positioning of the body to alleviate pain and pressure in ways not feasible in an ordinary bed. MARIELENA GURINDER requires a semi electric hospital bed. MARIELENA GURINDER requires frequent changes in body position due to dementia, and osteoarthritis. MARIELENA GURINDER also requires the head of the bed to be elevated more than 30 degrees due to dementia and avoid problems with aspiration. DX: bed-bound, max assist, limited mobility with incontinence, quadriplegia, osteoarthritis, #2DX; Congestive heart failure (systolic vs. diastolic), pulmonary disease (any obstructive vs. restrictive), COPD and any type of respiratory infection. Asthma, dysphagia, G-tube dependent, hypertension, seizures, GERD, A-fib, ETC.

## 2024-04-23 NOTE — PATIENT PROFILE ADULT - FUNCTIONAL ASSESSMENT - DAILY ACTIVITY ASSESSMENT TYPE
Case Management Discharge Planning Note    Patient name Karly Helton  Location OW OR POOL/OR POOL MRN [id number]  : 1948 Date 2024       Current Admission Date: 2024  Current Admission Diagnosis:Closed fracture of left ankle with routine healing   Patient Active Problem List    Diagnosis Date Noted    Closed fracture of left ankle with routine healing 2024    Prothrombin gene mutation (HCC) 2024    Essential hypertension 2024    Mixed hyperlipidemia 2024      LOS (days): 3  Geometric Mean LOS (GMLOS) (days): 2.8  Days to GMLOS:-0.1     OBJECTIVE:  Risk of Unplanned Readmission Score: 8.44         Current admission status: Inpatient   Preferred Pharmacy:   [facility]/pharmacy #[id number] [place], [place] - [address] [address]  Phone: [phone] Fax: [phone]    Primary Care Provider: No primary care provider on file.    Primary Insurance: [employer]  Secondary Insurance:     DISCHARGE DETAILS:     CM received feed back from acute rehab - several facilities feel inappropriate LOC and not saying available for rehab.     CM reviewed this information with PT - they will meet with patient after surgery and re-assess for appropriate LOC (STR vs acute).     CM added referrals for STR in [place].     CM reviewed updates with patient's daughter about acute vs STR. Will have better idea after therapy sees patient and once we have a plan, can pick facility then begin auth process through insurance. Patient's daughter was thankful for the information and will be in patient's daughter room for CM to follow up on choices.     CM to follow patient's care and discharge needs.      Admission

## 2024-05-02 ENCOUNTER — NON-APPOINTMENT (OUTPATIENT)
Age: 89
End: 2024-05-02

## 2024-05-09 ENCOUNTER — APPOINTMENT (OUTPATIENT)
Dept: HOME HEALTH SERVICES | Facility: HOME HEALTH | Age: 89
End: 2024-05-09
Payer: MEDICARE

## 2024-05-09 VITALS
RESPIRATION RATE: 18 BRPM | HEART RATE: 85 BPM | SYSTOLIC BLOOD PRESSURE: 121 MMHG | DIASTOLIC BLOOD PRESSURE: 69 MMHG | OXYGEN SATURATION: 96 % | TEMPERATURE: 98.4 F

## 2024-05-09 DIAGNOSIS — G30.9 ALZHEIMER'S DISEASE, UNSPECIFIED: ICD-10-CM

## 2024-05-09 DIAGNOSIS — F02.80 ALZHEIMER'S DISEASE, UNSPECIFIED: ICD-10-CM

## 2024-05-09 DIAGNOSIS — K59.09 OTHER CONSTIPATION: ICD-10-CM

## 2024-05-09 DIAGNOSIS — E03.9 HYPOTHYROIDISM, UNSPECIFIED: ICD-10-CM

## 2024-05-09 DIAGNOSIS — R73.09 OTHER ABNORMAL GLUCOSE: ICD-10-CM

## 2024-05-09 PROCEDURE — 99349 HOME/RES VST EST MOD MDM 40: CPT

## 2024-05-09 RX ORDER — SERTRALINE 25 MG/1
25 TABLET, FILM COATED ORAL
Qty: 14 | Refills: 0 | Status: COMPLETED | COMMUNITY
Start: 2024-04-10 | End: 2024-05-09

## 2024-05-09 NOTE — COUNSELING
[Normal Weight - ( BMI  <25 )] : normal weight - ( BMI  <25 ) [Continue diet as tolerated] : continue diet as tolerated based on goals of care [Non - Smoker] : non-smoker [Smoke/CO Detectors] : smoke/CO detectors [Use grab bars] : use grab bars [Remove clutter and unsafe carpeting to avoid falls] : remove clutter and unsafe carpeting to avoid falls [] : abdominal aortic ultrasound [Decrease hospital use] : decrease hospital use [Minimize unnecessary interventions] : minimize unnecessary interventions [Comfort Care] : comfort care [Discussed disease trajectory with patient/caregiver] : discussed disease trajectory with patient/caregiver [Likely to achieve goals/desired outcomes] : likely to achieve goals/desired outcomes [Patient/Caregiver has ___ understanding of disease process] : patient/caregiver has [unfilled] understanding of disease process [Advanced Directives discussed: ____] : Advanced directives discussed: [unfilled] [Completed Medical Orders for Life-Sustaining Treatment] : completed medical orders for life-sustaining treatment [DNR] : Code Status: DNR [Comfort] : Treatment Guidelines: Comfort [DNI] : Intubation: DNI [[facility] Date: _____] [facility]last verification date: [unfilled] [_____] : HCP: [unfilled]

## 2024-05-10 PROBLEM — K59.09 CHRONIC CONSTIPATION: Status: ACTIVE | Noted: 2024-04-10

## 2024-05-10 PROBLEM — E03.9 HYPOTHYROIDISM, UNSPECIFIED TYPE: Status: ACTIVE | Noted: 2024-04-16

## 2024-05-10 PROBLEM — R73.09 ELEVATED GLUCOSE LEVEL: Status: RESOLVED | Noted: 2024-04-18 | Resolved: 2024-05-10

## 2024-05-10 PROBLEM — G30.9 ALZHEIMER'S DEMENTIA: Status: ACTIVE | Noted: 2024-04-16

## 2024-05-10 RX ORDER — RISPERIDONE 0.25 MG/1
0.25 TABLET, FILM COATED ORAL
Qty: 30 | Refills: 11 | Status: ACTIVE | COMMUNITY
Start: 2024-05-10 | End: 1900-01-01

## 2024-05-10 NOTE — HISTORY OF PRESENT ILLNESS
[Family Member] : family member [FreeTextEntry1] : dementia [FreeTextEntry2] : PMH Advanced Alzheimer dementia, Hypothyroidisms, OA, frequent falls. Information provided by Kenton Churchill  Interval events: more agitation in evenings, trying to scratch caregivers during changes/cleaning.  Patient seen laying in bed in NAD Gait/Falls/Assisted devices: bedbound, unable to leave the bed- 2 person assist.  Skin: L leg has a lump (was told that it was nothing).  Pain: back and hips Appetite: decreasing, only eating 2 ensure a day. eats minimal - a little fruit, bread or pasta but small amounts.  BM: Regular.  Urine: incontinent Sleep: sleeps well.  Mood: Easily agitated, paranoia towards caregivers.   Active medical problems: #Alzheimer's dementia, advanced dependent of all iadls and adls, 24/7 care, bedbound. No longer on sertraline. Some agitation.  #Hypothyroidism - levothyroxine 75mcg daily #Falls - bedbound now, multiple hospitalization for falls #OA - Tramadol 25mg BID prn (not really using) L leg with "fixator"   Social: Lives with [facility] 24/7 - private for now. Niece lives nearby DME: wheelchair (standard), commode, walker, grab bar.  HCP: Kenton Churchill MOLST: DNR/DNI/DNH (except for uncontrolled sxs), no FT or dialysis, yes ivf and abx

## 2024-05-10 NOTE — PHYSICAL EXAM
[No Acute Distress] : no acute distress [Normal Sclera/Conjunctiva] : normal sclera/conjunctiva [EOMI] : extra ocular movement intact [Normal Outer Ear/Nose] : the ears and nose were normal in appearance [Normal Oropharynx] : the oropharynx was normal [No Respiratory Distress] : no respiratory distress [Clear to Auscultation] : lungs were clear to auscultation bilaterally [No Accessory Muscle Use] : no accessory muscle use [Normal Rate] : heart rate was normal  [Regular Rhythm] : with a regular rhythm [Normal S1, S2] : normal S1 and S2 [No Murmurs] : no murmurs heard [No Edema] : there was no peripheral edema [Normal Bowel Sounds] : normal bowel sounds [Non Tender] : non-tender [Soft] : abdomen soft [Not Distended] : not distended [Normal Post Cervical Nodes] : no posterior cervical lymphadenopathy [Normal Anterior Cervical Nodes] : no anterior cervical lymphadenopathy [No Spinal Tenderness] : no spinal tenderness [No Rash] : no rash [Normal Affect] : the affect was normal [Normal Mood] : the mood was normal [de-identified] : frail elderly lady laying in bed [de-identified] : diffuse weakness, bedbound [de-identified] : unable to assess [de-identified] : poor memory, no insight/judgement

## 2024-05-10 NOTE — REASON FOR VISIT
[Follow-Up] : a follow-up visit [Family Member] : family member [Formal Caregiver] : formal caregiver [Pre-Visit Preparation] : pre-visit preparation was done [Intercurrent Specialty/Sub-specialty Visits] : the patient has intercurrent specialty/sub-specialty visits [FreeTextEntry1] : dementia [FreeTextEntry2] : chart review [FreeTextEntry3] : optometry

## 2024-05-24 ENCOUNTER — NON-APPOINTMENT (OUTPATIENT)
Age: 89
End: 2024-05-24

## 2024-05-25 ENCOUNTER — NON-APPOINTMENT (OUTPATIENT)
Age: 89
End: 2024-05-25

## 2024-05-26 ENCOUNTER — NON-APPOINTMENT (OUTPATIENT)
Age: 89
End: 2024-05-26

## 2024-05-31 DIAGNOSIS — F03.918 UNSPECIFIED DEMENTIA, UNSPECIFIED SEVERITY, WITH OTHER BEHAVIORAL DISTURBANCE: ICD-10-CM

## 2024-06-17 NOTE — ED PROVIDER NOTE - WR ORDER STATUS 3
Chief Complaint   Patient presents with   • Wart     Left foot   • Derm Problem     Left hip this weekend, right hand started today        HISTORY OF PRESENT ILLNESS:  This is a 9 year old male here today for evalution of warts and a rash.    Warts on bottom of left foot.  - no treatments at home attempted.    Rash on right wrist and left hip that developed over the past couple days.    - itchy    GM also requested a medication for Yuan's anxiety.  - working with therapist at [facility] every couple weeks  - was treated with guanfacine and Quillichew until 8/2023.  Missed a couple Psychiatry appts so no longer in medications.  - GM states that prior medications did not seem to help him.  - wondering if there is something else that can be prescribed for his difficulty falling asleep    Allergies as of 06/17/2024 - Reviewed 06/17/2024   Allergen Reaction Noted   • Food   (food or med) Other (See Comments) 10/22/2017       Immunizations:  up to date      PHYSICAL EXAM:  Visit Vitals  Pulse 96   Temp (!) 96.6 °F (35.9 °C) (Temporal)   Wt 54.3 kg (119 lb 9.6 oz)     Constitutional:  Well developed, well nourished, no acute distress, non-toxic appearance   Integument:  one wart on bottom of left foot     PROCEDURE:wart treated by scraping with a sterile blade and application of liquid nitrogen    ASSESSMENT and Plan:  1) Viral Wart treated with chemical ablation  Reviewed possible side effects of treatment including blistering, redness or infection.   Continue home treatment with duct tape, pumice and removal of blister if it develops.  Monitor for signs of infection.  Consider return to clinic in 3-4 weeks if wart is still present.     2. Contact Dermatitis  triamcinalone cream 0.1% applied to dry skin every 12 hours as needed  Call if not improving in 2-3 weeks or if symptoms seem to get worse    3. ADHD and Anxiety  4. Sleep Problems  Continue to work with [facility] therapist  Discuss return to psychiatry with  therapist  Work toward stable bedtime routine.             Resulted

## 2024-07-31 ENCOUNTER — APPOINTMENT (OUTPATIENT)
Dept: HOME HEALTH SERVICES | Facility: HOME HEALTH | Age: 89
End: 2024-07-31

## 2024-07-31 DIAGNOSIS — K59.09 OTHER CONSTIPATION: ICD-10-CM

## 2024-07-31 DIAGNOSIS — F32.A DEPRESSION, UNSPECIFIED: ICD-10-CM

## 2024-07-31 DIAGNOSIS — L89.621 PRESSURE ULCER OF LEFT HEEL, STAGE 1: ICD-10-CM

## 2024-07-31 DIAGNOSIS — R77.0 ABNORMALITY OF ALBUMIN: ICD-10-CM

## 2024-07-31 DIAGNOSIS — F02.80 ALZHEIMER'S DISEASE, UNSPECIFIED: ICD-10-CM

## 2024-07-31 DIAGNOSIS — F03.918 UNSPECIFIED DEMENTIA, UNSPECIFIED SEVERITY, WITH OTHER BEHAVIORAL DISTURBANCE: ICD-10-CM

## 2024-07-31 DIAGNOSIS — E03.9 HYPOTHYROIDISM, UNSPECIFIED: ICD-10-CM

## 2024-07-31 DIAGNOSIS — Z23 ENCOUNTER FOR IMMUNIZATION: ICD-10-CM

## 2024-07-31 DIAGNOSIS — M15.0 PRIMARY GENERALIZED (OSTEO)ARTHRITIS: ICD-10-CM

## 2024-07-31 DIAGNOSIS — G30.9 ALZHEIMER'S DISEASE, UNSPECIFIED: ICD-10-CM

## 2024-07-31 DIAGNOSIS — L89.611 PRESSURE ULCER OF RIGHT HEEL, STAGE 1: ICD-10-CM

## 2024-07-31 PROCEDURE — 99349 HOME/RES VST EST MOD MDM 40: CPT

## 2024-08-01 VITALS
TEMPERATURE: 98.2 F | HEART RATE: 77 BPM | HEIGHT: 65 IN | SYSTOLIC BLOOD PRESSURE: 112 MMHG | BODY MASS INDEX: 26.66 KG/M2 | DIASTOLIC BLOOD PRESSURE: 60 MMHG | WEIGHT: 160 LBS | RESPIRATION RATE: 16 BRPM | OXYGEN SATURATION: 97 %

## 2024-08-01 PROBLEM — Z23 ENCOUNTER FOR IMMUNIZATION: Status: ACTIVE | Noted: 2024-04-21 | Resolved: 2024-08-15

## 2024-08-01 PROBLEM — R77.0 LOW SERUM ALBUMIN: Status: ACTIVE | Noted: 2024-08-01

## 2024-08-01 PROBLEM — L89.621 PRESSURE INJURY OF LEFT HEEL, STAGE 1: Status: ACTIVE | Noted: 2024-08-01

## 2024-08-01 PROBLEM — L89.611 PRESSURE INJURY OF RIGHT HEEL, STAGE 1: Status: ACTIVE | Noted: 2024-08-01

## 2024-08-01 PROBLEM — M15.0 PRIMARY OSTEOARTHRITIS INVOLVING MULTIPLE JOINTS: Status: ACTIVE | Noted: 2024-04-16

## 2024-08-01 NOTE — HISTORY OF PRESENT ILLNESS
[Family Member] : family member [Formal Caregiver] : formal caregiver [FreeTextEntry1] : dementia [FreeTextEntry2] :  08/01/2024 COVID SCREEN:Patient or caretaker denies fever, cough, trouble breathing, rash, vomiting. Patient has not been in close contact with anyone who is COVID-19 positive, or suspected of having COVID-19.   N95 mask, gloves, eye wear and gown (if indicated) used during visit: Yes.    PMH Advanced Alzheimer dementia, Hypothyroidisms, OA, frequent falls. Information provided by Kenton Churchill  8/1/24 Dementia/Agitation improved with half pill of risperidone, just started to give regularly 3 weeks ago, seems to help but still some agitation. Diet - 2-3 ensure, watermelon, a little water Skin - ok Lump on leg improving Pain - Tylenol 650 - giving 1-2 tabs prn for pain, pt c/o pain when getting dressed. Have avoided giving Tramadol because of constipaiton. Off omeprazolr Constipation - not taking anything, going daily, but hard, has to strain Refused physical therapy when came ROS: Pt denies CP, SOB, n/v, rash or skin irritation, changes in vision, does c/o difficulty hearing, feels well overall, tired, mood is ok, limited appetite, no swallowing issues, no obvious weight changes, not sure why she doesn't get out of bed except nowhere to go.  5/9/24 Interval events: more agitation in evenings, trying to scratch caregivers during changes/cleaning.  Patient seen laying in bed in NAD Gait/Falls/Assisted devices: bedbound, unable to leave the bed- 2 person assist.  Skin: L leg has a lump (was told that it was nothing).  Pain: back and hips Appetite: decreasing, only eating 2 ensure a day. eats minimal - a little fruit, bread or pasta but small amounts.  BM: Regular.  Urine: incontinent Sleep: sleeps well.  Mood: Easily agitated, paranoia towards caregivers.   Active medical problems: #Alzheimer's dementia, advanced dependent of all iadls and adls, 24/7 care, bedbound. No longer on sertraline. Some agitation.  #Hypothyroidism - levothyroxine 75mcg daily #Falls - bedbound now, multiple hospitalization for falls #OA - Tramadol 25mg BID prn (not really using) L leg with "fixator"   Social: Lives with [facility] 24/7 - private for now. Niece lives nearby DME: wheelchair (standard), commode, walker, grab bar.  HCP: Kenton EDMONDSON: DNR/DNI/DNH (except for uncontrolled sxs), no FT or dialysis, yes ivf and abx

## 2024-08-01 NOTE — COMPREHENSIVE ASSESSMENT
[Comprehensive Assessment Reviewed] : Comprehensive assessment reviewed [No Action Needed] : No action needed Unable to assess

## 2024-08-01 NOTE — REVIEW OF SYSTEMS
[Negative] : Heme/Lymph [FreeTextEntry2] : see HPI, pt answering questions and aide/niece clarify/confirm

## 2024-08-01 NOTE — COUNSELING
[Normal Weight - ( BMI  <25 )] : normal weight - ( BMI  <25 ) [Continue diet as tolerated] : continue diet as tolerated based on goals of care [Non - Smoker] : non-smoker [Smoke/CO Detectors] : smoke/CO detectors [Use grab bars] : use grab bars [Remove clutter and unsafe carpeting to avoid falls] : remove clutter and unsafe carpeting to avoid falls [] : abdominal aortic ultrasound [Decrease hospital use] : decrease hospital use [Minimize unnecessary interventions] : minimize unnecessary interventions [Comfort Care] : comfort care [Discussed disease trajectory with patient/caregiver] : discussed disease trajectory with patient/caregiver [Likely to achieve goals/desired outcomes] : likely to achieve goals/desired outcomes [Patient/Caregiver has ___ understanding of disease process] : patient/caregiver has [unfilled] understanding of disease process [Advanced Directives discussed: ____] : Advanced directives discussed: [unfilled] [Completed Medical Orders for Life-Sustaining Treatment] : completed medical orders for life-sustaining treatment [DNR] : Code Status: DNR [Comfort] : Treatment Guidelines: Comfort [DNI] : Intubation: DNI [[facility]: _____[facility]/last verification date: [unfilled] [_____] : HCP: [unfilled]

## 2024-08-01 NOTE — HISTORY OF PRESENT ILLNESS
[Family Member] : family member [Formal Caregiver] : formal caregiver [FreeTextEntry1] : dementia [FreeTextEntry2] :  08/01/2024 COVID SCREEN:Patient or caretaker denies fever, cough, trouble breathing, rash, vomiting. Patient has not been in close contact with anyone who is COVID-19 positive, or suspected of having COVID-19.   N95 mask, gloves, eye wear and gown (if indicated) used during visit: Yes.    PMH Advanced Alzheimer dementia, Hypothyroidisms, OA, frequent falls. Information provided by Kenton Churchill  8/1/24 Dementia/Agitation improved with half pill of risperidone, just started to give regularly 3 weeks ago, seems to help but still some agitation. Diet - 2-3 ensure, watermelon, a little water Skin - ok Lump on leg improving Pain - Tylenol 650 - giving 1-2 tabs prn for pain, pt c/o pain when getting dressed. Have avoided giving Tramadol because of constipaiton. Off omeprazolr Constipation - not taking anything, going daily, but hard, has to strain Refused physical therapy when came ROS: Pt denies CP, SOB, n/v, rash or skin irritation, changes in vision, does c/o difficulty hearing, feels well overall, tired, mood is ok, limited appetite, no swallowing issues, no obvious weight changes, not sure why she doesn't get out of bed except nowhere to go.  5/9/24 Interval events: more agitation in evenings, trying to scratch caregivers during changes/cleaning.  Patient seen laying in bed in NAD Gait/Falls/Assisted devices: bedbound, unable to leave the bed- 2 person assist.  Skin: L leg has a lump (was told that it was nothing).  Pain: back and hips Appetite: decreasing, only eating 2 ensure a day. eats minimal - a little fruit, bread or pasta but small amounts.  BM: Regular.  Urine: incontinent Sleep: sleeps well.  Mood: Easily agitated, paranoia towards caregivers.   Active medical problems: #Alzheimer's dementia, advanced dependent of all iadls and adls, 24/7 care, bedbound. No longer on sertraline. Some agitation.  #Hypothyroidism - levothyroxine 75mcg daily #Falls - bedbound now, multiple hospitalization for falls #OA - Tramadol 25mg BID prn (not really using) L leg with "fixator"   Social: Lives with [facility] 24/7 - private for now. Niece lives nearby DME: wheelchair (standard), commode, walker, grab bar.  HCP: Kenton EDMONDSON: DNR/DNI/DNH (except for uncontrolled sxs), no FT or dialysis, yes ivf and abx

## 2024-08-01 NOTE — COUNSELING
[Normal Weight - ( BMI  <25 )] : normal weight - ( BMI  <25 ) [Continue diet as tolerated] : continue diet as tolerated based on goals of care [Non - Smoker] : non-smoker [Smoke/CO Detectors] : smoke/CO detectors [Use grab bars] : use grab bars [Remove clutter and unsafe carpeting to avoid falls] : remove clutter and unsafe carpeting to avoid falls [] : abdominal aortic ultrasound [Decrease hospital use] : decrease hospital use [Minimize unnecessary interventions] : minimize unnecessary interventions [Comfort Care] : comfort care [Discussed disease trajectory with patient/caregiver] : discussed disease trajectory with patient/caregiver [Likely to achieve goals/desired outcomes] : likely to achieve goals/desired outcomes [Patient/Caregiver has ___ understanding of disease process] : patient/caregiver has [unfilled] understanding of disease process [Advanced Directives discussed: ____] : Advanced directives discussed: [unfilled] [Completed Medical Orders for Life-Sustaining Treatment] : completed medical orders for life-sustaining treatment [DNR] : Code Status: DNR [Comfort] : Treatment Guidelines: Comfort [DNI] : Intubation: DNI [Last Verification Date: _____] : [facility] Completion/last verification date: [unfilled] [_____] : HCP: [unfilled]

## 2024-08-01 NOTE — PHYSICAL EXAM
[No Acute Distress] : no acute distress [Normal Voice/Communication] : normal voice communication [Normal Sclera/Conjunctiva] : normal sclera/conjunctiva [EOMI] : extra ocular movement intact [Normal Outer Ear/Nose] : the ears and nose were normal in appearance [Normal Oropharynx] : the oropharynx was normal [Supple] : the neck was supple [No LAD] : no lymphadenopathy [No Respiratory Distress] : no respiratory distress [Clear to Auscultation] : lungs were clear to auscultation bilaterally [No Accessory Muscle Use] : no accessory muscle use [Normal Rate] : heart rate was normal  [Regular Rhythm] : with a regular rhythm [Normal S1, S2] : normal S1 and S2 [No Murmurs] : no murmurs heard [No Edema] : there was no peripheral edema [Normal Bowel Sounds] : normal bowel sounds [Non Tender] : non-tender [Soft] : abdomen soft [Not Distended] : not distended [Normal Post Cervical Nodes] : no posterior cervical lymphadenopathy [Normal Anterior Cervical Nodes] : no anterior cervical lymphadenopathy [No Spinal Tenderness] : no spinal tenderness [No Rash] : no rash [Cranial Nerves Intact] : cranial nerves 2-12 were intact [Normal Affect] : the affect was normal [Normal Mood] : the mood was normal [de-identified] : frail elderly lady laying in bed, AAO to self, aide, niece, location and year but not month [de-identified] : diffuse weakness, bedbound [de-identified] : Lump on L lower leg slightly hyperpigmented ~ 3 cm in diameter, NTTP, nonfluctuant; unable to assess sacrum due to pt pain; heels with blanchable redness b/l, resting on mattress [de-identified] : generalized weakness

## 2024-08-01 NOTE — PHYSICAL EXAM
[No Acute Distress] : no acute distress [Normal Voice/Communication] : normal voice communication [Normal Sclera/Conjunctiva] : normal sclera/conjunctiva [EOMI] : extra ocular movement intact [Normal Outer Ear/Nose] : the ears and nose were normal in appearance [Normal Oropharynx] : the oropharynx was normal [Supple] : the neck was supple [No LAD] : no lymphadenopathy [No Respiratory Distress] : no respiratory distress [Clear to Auscultation] : lungs were clear to auscultation bilaterally [No Accessory Muscle Use] : no accessory muscle use [Normal Rate] : heart rate was normal  [Regular Rhythm] : with a regular rhythm [Normal S1, S2] : normal S1 and S2 [No Murmurs] : no murmurs heard [No Edema] : there was no peripheral edema [Normal Bowel Sounds] : normal bowel sounds [Non Tender] : non-tender [Soft] : abdomen soft [Not Distended] : not distended [Normal Post Cervical Nodes] : no posterior cervical lymphadenopathy [Normal Anterior Cervical Nodes] : no anterior cervical lymphadenopathy [No Spinal Tenderness] : no spinal tenderness [No Rash] : no rash [Cranial Nerves Intact] : cranial nerves 2-12 were intact [Normal Affect] : the affect was normal [Normal Mood] : the mood was normal [de-identified] : frail elderly lady laying in bed, AAO to self, aide, niece, location and year but not month [de-identified] : diffuse weakness, bedbound [de-identified] : Lump on L lower leg slightly hyperpigmented ~ 3 cm in diameter, NTTP, nonfluctuant; unable to assess sacrum due to pt pain; heels with blanchable redness b/l, resting on mattress [de-identified] : generalized weakness

## 2024-08-05 ENCOUNTER — LABORATORY RESULT (OUTPATIENT)
Age: 89
End: 2024-08-05

## 2024-08-06 NOTE — PATIENT PROFILE ADULT - FALL HARM RISK - DATE OF FALL
Frances Mcdowell is a 56 y.o. female on day 1 of admission presenting with Cellulitis of lower extremity, unspecified laterality.      Subjective   Frances Mcdowell is a 56 y.o. female with PMHx s/f hypothyroidism, venous insufficiency, lymph edema presenting with nausea, fever increased drainage from lower extremities head ache leg swelling . Pt has chronic lymph edema progressively has worsened over last several months, lately increased drainage from ulcerations that are also painful, treated with doxycycline PTA. Pt is wound nurse, has been managing them. She has seen specialist for venous insufficiency but no intervention planned. Pt denies chest pain, shortness of breath, admits to body aches, cough. lactate 2.1  troponin 103-93.  CBC shows WBC 22.6 hemoglobin 12.1 hematocrit 36.6 platelets 337. Chest x-ray shows lungs to be clear. Ultrasound of lower extremities negative for DVT. Lactate 2.1-->1.5. Echo: EF 60-65%, moderately increased left ventricular septal thickness, moderately enlarged right ventricle, mildly elevated RVSP    8/6/2024: No acute events overnight. Vitals stable, afebrile. CBC shows improved leukocytosis. Hgb down to 10.7 (12.1). Bl cx x2 are pending. Patient reports she is feeling better today. Still with pain and swelling.erythema of BLE L>R. Will ask for podiatry to evaluate, she likely would benefit from follow up in wound care center. Seen by ID, changed to ancef. Seen by cardiology, echo with normal LV EF, no chest pain or active cardiac symptoms, no further cardiac testing recommended at this time and cardiology has signed off.         Review of Systems   Constitutional:  Negative for appetite change, chills, diaphoresis, fatigue and fever.   HENT:  Negative for congestion, ear pain, facial swelling, hearing loss, nosebleeds, sore throat, tinnitus and trouble swallowing.    Eyes:  Negative for pain.   Respiratory:  Negative for cough, chest tightness, shortness of breath and wheezing.     Cardiovascular:  Positive for leg swelling. Negative for chest pain and palpitations.   Gastrointestinal:  Negative for abdominal pain, blood in stool, constipation, diarrhea, nausea and vomiting.   Genitourinary:  Negative for dysuria, flank pain, frequency, hematuria and urgency.   Musculoskeletal:  Negative for back pain and joint swelling.   Skin:  Positive for rash and wound.   Neurological:  Negative for dizziness, syncope, weakness, light-headedness, numbness and headaches.   Hematological:  Does not bruise/bleed easily.   Psychiatric/Behavioral:  Negative for behavioral problems, hallucinations and suicidal ideas.           Objective     Last Recorded Vitals  /81 (BP Location: Left arm, Patient Position: Lying)   Pulse 86   Temp 36.2 °C (97.1 °F) (Temporal)   Resp 17   Wt 135 kg (297 lb 6.4 oz)   SpO2 97%     Image Results  Transthoracic Echo (TTE) Complete    Result Date: 8/5/2024              [place] [address]      Phone [phone] Fax [phone] TRANSTHORACIC ECHOCARDIOGRAM REPORT Patient Name:      KYLE Ledezma Physician:    [id number] Marya Huff MD Study Date:        8/5/2024              Ordering Provider:    [id number] MANJULA LOCKHART MRN/PID:           [id number]              Fellow: Accession#:        [id number]          Nurse: Date of Birth/Age: 1967 / 56 years  Sonographer:          Cheryl Ann RDCS Gender:            F                     Additional Staff: Height:            160.02 cm             Admit Date:           8/5/2024 Weight:            127.01 kg             Admission Status:     Inpatient -                                                                Routine BSA / BMI:         2.23 m2 / 49.60 kg/m2 Department Location:  [facility] Blood Pressure: 99 /58 mmHg Study Type:     TRANSTHORACIC ECHO (TTE) COMPLETE Diagnosis/ICD: Dyspnea, unspecified-R06.00 Indication:    Dyspnea CPT Codes:     Echo Complete w Full Doppler-22674; Myocardial Strain                Imaging-[id number] Patient History: Pertinent History: HTN and Dyspnea. Study Detail: The following Echo studies were performed: 2D, M-Mode, Doppler,               color flow and Strain.  PHYSICIAN INTERPRETATION: Left Ventricle: The left ventricular systolic function is normal, with a visually estimated ejection fraction of 60-65%. There are no regional wall motion abnormalities. The left ventricular cavity size is normal. The left ventricular septal wall thickness is moderately increased. Left Ventricular Global Longitudinal Strain - 17.3 %. Spectral Doppler shows a normal pattern of left ventricular diastolic filling. Left Atrium: The left atrium is mildly dilated. Right Ventricle: The right ventricle is moderately enlarged. There is normal right ventricular global systolic function. RV Strain RVFWSL -25%, RV4CSL -21.3 %. Right Atrium: The right atrium is normal in size. Aortic Valve: The aortic valve is trileaflet. There is no evidence of aortic valve stenosis. The aortic valve dimensionless index is 0.70. There is no evidence of aortic valve regurgitation. The peak instantaneous gradient of the aortic valve is 13.8 mmHg. The mean gradient of the aortic valve is 8.0 mmHg. Mitral Valve: The mitral valve is mildly thickened. There is mild mitral annular calcification. There is trace mitral valve regurgitation. Tricuspid Valve: The tricuspid valve is structurally normal. There is trace tricuspid regurgitation. The Doppler estimated RVSP is mildly elevated at 37.3 mmHg. Pulmonic Valve: The pulmonic valve is structurally normal. There is no indication of pulmonic valve regurgitation. Pericardium: There is no pericardial effusion noted. Aorta: The aortic root is normal. Systemic Veins: The inferior vena cava appears to be of normal size.   CONCLUSIONS:  1. The left ventricular systolic function is normal, with a visually estimated ejection fraction of 60-65%.  2. Moderately increased left ventricular septal thickness.  3. Moderately enlarged right ventricle.  4. RV Strain RVFWSL -25%, RV4CSL -21.3 %.  5. There is normal right ventricular global systolic function.  6. Mildly elevated RVSP.  7. Aortic valve stenosis is not present.  8. Left Ventricular Global Longitudinal Strain - 17.3 %. QUANTITATIVE DATA SUMMARY: 2D MEASUREMENTS:                          Normal Ranges: Ao Root d:     3.30 cm   (2.0-3.7cm) LAs:           3.60 cm   (2.7-4.0cm) IVSd:          1.40 cm   (0.6-1.1cm) LVPWd:         1.10 cm   (0.6-1.1cm) LVIDd:         4.20 cm   (3.9-5.9cm) LVIDs:         2.40 cm LV Mass Index: 84.8 g/m2 LV % FS        42.9 % LA VOLUME:                               Normal Ranges: LA Vol A4C:        50.1 ml    (22+/-6mL/m2) LA Vol A2C:        66.9 ml LA Vol BP:         58.4 ml LA Vol Index A4C:  22.4ml/m2 LA Vol Index A2C:  30.0 ml/m2 LA Vol Index BP:   26.2 ml/m2 LA Area A4C:       18.0 cm2 LA Area A2C:       21.0 cm2 LA Major Axis A4C: 5.5 cm LA Major Axis A2C: 5.6 cm LA Volume Index:   25.0 ml/m2 RA VOLUME BY A/L METHOD:                       Normal Ranges: RA Area A4C: 14.5 cm2 M-MODE MEASUREMENTS:                  Normal Ranges: Ao Root: 3.30 cm (2.0-3.7cm) AoV Exc: 2.40 cm (1.5-2.5cm) LAs:     3.40 cm (2.7-4.0cm) AORTA MEASUREMENTS:                      Normal Ranges: AoV Exc:     2.40 cm (1.5-2.5cm) Ao Sinus, d: 3.30 cm (2.1-3.5cm) Asc Ao, d:   3.50 cm (2.1-3.4cm) Ao Arch:     3.20 cm (2.0-3.6cm) LV SYSTOLIC FUNCTION BY 2D PLANIMETRY (MOD):                                        Normal Ranges: EF-A4C View:                      68 % (>=55%) EF-A2C View:                      60 % EF-Biplane:                       64 % EF-Visual:                        63 % LV EF Reported:                   63 % Global Longitudinal Strain (GLS): 17 % LV DIASTOLIC  FUNCTION:                           Normal Ranges: MV Peak E:    0.70 m/s    (0.7-1.2 m/s) MV Peak A:    0.92 m/s    (0.42-0.7 m/s) E/A Ratio:    0.76        (1.0-2.2) MV e'         0.078 m/s   (>8.0) MV lateral e' 0.08 m/s MV medial e'  0.07 m/s MV A Dur:     103.00 msec E/e' Ratio:   8.97        (<8.0) LV IVRT:      87 msec     (<100ms) AORTIC VALVE:                                    Normal Ranges: AoV Vmax:                1.86 m/s  (<=1.7m/s) AoV Peak P.8 mmHg (<20mmHg) AoV Mean P.0 mmHg  (1.7-11.5mmHg) LVOT Max Jacoby:            1.39 m/s  (<=1.1m/s) AoV VTI:                 36.20 cm  (18-25cm) LVOT VTI:                25.40 cm LVOT Diameter:           2.00 cm   (1.8-2.4cm) AoV Area, VTI:           2.20 cm2  (2.5-5.5cm2) AoV Area,Vmax:           2.35 cm2  (2.5-4.5cm2) AoV Dimensionless Index: 0.70  RIGHT VENTRICLE: RV Basal 3.80 cm RV Mid   4.20 cm RV Major 7.1 cm TAPSE:   20.4 mm RV s'    0.19 m/s TRICUSPID VALVE/RVSP:                             Normal Ranges: Peak TR Velocity: 2.93 m/s RV Syst Pressure: 37.3 mmHg (< 30mmHg) TV E Vmax:        0.51 m/s  (0.3-0.7m/s) IVC Diam:         1.00 cm PULMONIC VALVE:                      Normal Ranges: PV Max Jacoby: 1.1 m/s  (0.6-0.9m/s) PV Max P.1 mmHg  [id number] Marya Huff MD Electronically signed on 2024 at 4:55:15 PM  ** Final **     ECG 12 lead    Result Date: 2024  Sinus tachycardia    XR chest 2 views    Result Date: 2024  STUDY: Chest Radiographs;  2024 6:21AM INDICATION: Cough. COMPARISON: None Available ACCESSION NUMBER(S): [id number] ORDERING CLINICIAN: AMIRA KEN TECHNIQUE:  Frontal and lateral chest. FINDINGS: CARDIOMEDIASTINAL SILHOUETTE: Cardiomediastinal silhouette is normal in size and configuration.  LUNGS: Lungs are clear.  ABDOMEN: No remarkable upper abdominal findings.  BONES: No acute osseous changes.    No acute cardiopulmonary disease. Signed by Melvin Armas  Results  Results for orders placed or performed during the hospital encounter of 08/05/24 (from the past 24 hour(s))   Transthoracic Echo (TTE) Complete   Result Value Ref Range    LVOT diam 2.00 cm    LV Biplane EF 64 %    MV E/A ratio 0.76     MV avg E/e' ratio 9.31     Tricuspid annular plane systolic excursion 2.0 cm    AV mn grad 8.0 mmHg    LA vol index A/L 26.2 ml/m2    AV pk chelsey 1.86 m/s    LV EF 63 %    RV free wall pk S' 19.40 cm/s    LV GLS 17.3 %    RVSP 37.3 mmHg    LVIDd 4.20 cm    Aortic Valve Area by Continuity of VTI 2.20 cm2    Aortic Valve Area by Continuity of Peak Velocity 2.35 cm2    AV pk grad 13.8 mmHg    LV A4C EF 68.4    Basic metabolic panel   Result Value Ref Range    Glucose 105 (H) 74 - 99 mg/dL    Sodium 136 136 - 145 mmol/L    Potassium 3.5 3.5 - 5.3 mmol/L    Chloride 105 98 - 107 mmol/L    Bicarbonate 27 21 - 32 mmol/L    Anion Gap 8 (L) 10 - 20 mmol/L    Urea Nitrogen 19 6 - 23 mg/dL    Creatinine 0.77 0.50 - 1.05 mg/dL    eGFR >90 >60 mL/min/1.73m*2    Calcium 7.9 (L) 8.6 - 10.3 mg/dL   Magnesium   Result Value Ref Range    Magnesium 1.81 1.60 - 2.40 mg/dL   CBC   Result Value Ref Range    WBC 16.4 (H) 4.4 - 11.3 x10*3/uL    nRBC 0.0 0.0 - 0.0 /100 WBCs    RBC 3.23 (L) 4.00 - 5.20 x10*6/uL    Hemoglobin 10.7 (L) 12.0 - 16.0 g/dL    Hematocrit 31.4 (L) 36.0 - 46.0 %    MCV 97 80 - 100 fL    MCH 33.1 26.0 - 34.0 pg    MCHC 34.1 32.0 - 36.0 g/dL    RDW 13.2 11.5 - 14.5 %    Platelets 275 150 - 450 x10*3/uL        Medications  Scheduled medications:  amphetamine-dextroamphetamine, 20 mg, oral, BID  aspirin, 81 mg, oral, Daily  ceFAZolin, 2 g, intravenous, q6h  cholecalciferol, 5,000 Units, oral, Daily  enoxaparin, 40 mg, subcutaneous, q12h ELENA  levothyroxine, 100 mcg, oral, Daily  pantoprazole, 40 mg, oral, Daily before breakfast  polyethylene glycol, 17 g, oral, Daily  predniSONE, 10 mg, oral, Daily      Continuous medications:     PRN medications:  PRN medications: acetaminophen  **OR** acetaminophen **OR** acetaminophen, ondansetron ODT **OR** ondansetron, oxyCODONE-acetaminophen     Physical Exam  Constitutional:       General: She is not in acute distress.     Appearance: Normal appearance. She is obese.   HENT:      Head: Normocephalic and atraumatic.      Right Ear: External ear normal.      Left Ear: External ear normal.      Nose: Nose normal.      Mouth/Throat:      Mouth: Mucous membranes are moist.      Pharynx: Oropharynx is clear.   Eyes:      Extraocular Movements: Extraocular movements intact.      Conjunctiva/sclera: Conjunctivae normal.      Pupils: Pupils are equal, round, and reactive to light.   Cardiovascular:      Rate and Rhythm: Normal rate and regular rhythm.      Pulses: Normal pulses.      Heart sounds: Normal heart sounds.   Pulmonary:      Effort: Pulmonary effort is normal. No respiratory distress.      Breath sounds: Normal breath sounds. No wheezing, rhonchi or rales.   Abdominal:      General: Bowel sounds are normal.      Palpations: Abdomen is soft.      Tenderness: There is no abdominal tenderness. There is no right CVA tenderness, left CVA tenderness, guarding or rebound.   Musculoskeletal:         General: No swelling. Normal range of motion.      Cervical back: Normal range of motion and neck supple.      Right lower leg: Edema present.      Left lower leg: Edema present.   Skin:     General: Skin is warm and dry.      Capillary Refill: Capillary refill takes less than 2 seconds.      Findings: Erythema, lesion and rash present.      Comments: Multiple ulceration on posterior aspect of L calf/ankle  Blisters on L medial calf/ankle with erythema up above L knee that is warm to touch   Neurological:      General: No focal deficit present.      Mental Status: She is alert and oriented to person, place, and time. Mental status is at baseline.   Psychiatric:         Mood and Affect: Mood normal.         Behavior: Behavior normal.                  Code  Status  Full Code     Assessment/Plan   This patient currently has cardiac telemetry ordered; if you would like to modify or discontinue the telemetry order, click here to go to the orders activity to modify/discontinue the order.  BLE cellulitis with failure of outpatient antibiotics and sepsis  Sepsis POA, endorgan dysfunction: Type II MI  ID consult appreciated  Podiatry consult appreciated  Local wound care  IV fluid hydration  Repeat lactate has resolved  IV Ancef  Failed 1 week of doxycycline prior to admission  Does have known lymphedema and venous insufficiency  MRI ordered  Check inflammatory markers  Wound culture  Follow blood cultures x 2    Type II MI  Demand ischemia from severe sepsis  Troponin downtrending  Echo with normal LV EF  Consult cardiology, no current chest pain or cardiac symptoms  Cardiology has signed off    Hypothyroidism  Continue Synthroid    Venous insufficiency   Lymphedema  Appreciate podiatry recommendations  Could consider diuretics if does not get any improvement in edema with treatment of her cellulitis    Hypomagnesemia  Supplement    Severe obesity BMI 52.68  Severe obesity requiring increased utilization of hospital resources as demonstrated by BMI      DVT ppx: Lovenox       Please see orders for more complete plan    Inge Gunderson PA-C   28-Dec-2023

## 2024-08-09 ENCOUNTER — NON-APPOINTMENT (OUTPATIENT)
Age: 89
End: 2024-08-09

## 2024-08-09 PROBLEM — D64.9 ANEMIA: Status: ACTIVE | Noted: 2024-08-01

## 2024-08-09 LAB
25(OH)D3 SERPL-MCNC: 29.2 NG/ML
ALBUMIN SERPL ELPH-MCNC: 3.1 G/DL
ALP BLD-CCNC: 83 U/L
ALT SERPL-CCNC: 6 U/L
ANION GAP SERPL CALC-SCNC: 13 MMOL/L
AST SERPL-CCNC: 16 U/L
BASOPHILS # BLD AUTO: 0.04 K/UL
BASOPHILS NFR BLD AUTO: 0.5 %
BILIRUB SERPL-MCNC: 0.5 MG/DL
BUN SERPL-MCNC: 13 MG/DL
CALCIUM SERPL-MCNC: 9.1 MG/DL
CHLORIDE SERPL-SCNC: 105 MMOL/L
CO2 SERPL-SCNC: 24 MMOL/L
CREAT SERPL-MCNC: 0.64 MG/DL
EGFR: 85 ML/MIN/1.73M2
EOSINOPHIL # BLD AUTO: 0.12 K/UL
EOSINOPHIL NFR BLD AUTO: 1.4 %
FERRITIN SERPL-MCNC: 356 NG/ML
GLUCOSE SERPL-MCNC: 107 MG/DL
HCT VFR BLD CALC: 36.4 %
HGB BLD-MCNC: 11.4 G/DL
IMM GRANULOCYTES NFR BLD AUTO: 0.3 %
IRON SATN MFR SERPL: 11 %
IRON SERPL-MCNC: 27 UG/DL
LYMPHOCYTES # BLD AUTO: 1.85 K/UL
LYMPHOCYTES NFR BLD AUTO: 21.1 %
MAN DIFF?: NORMAL
MCHC RBC-ENTMCNC: 27.1 PG
MCHC RBC-ENTMCNC: 31.3 GM/DL
MCV RBC AUTO: 86.5 FL
MONOCYTES # BLD AUTO: 0.56 K/UL
MONOCYTES NFR BLD AUTO: 6.4 %
NEUTROPHILS # BLD AUTO: 6.17 K/UL
NEUTROPHILS NFR BLD AUTO: 70.3 %
PLATELET # BLD AUTO: 328 K/UL
POTASSIUM SERPL-SCNC: 3.8 MMOL/L
PROT SERPL-MCNC: 6.4 G/DL
RBC # BLD: 4.21 M/UL
RBC # FLD: 15.2 %
SODIUM SERPL-SCNC: 142 MMOL/L
TIBC SERPL-MCNC: 239 UG/DL
UIBC SERPL-MCNC: 212 UG/DL
WBC # FLD AUTO: 8.77 K/UL

## 2024-08-13 ENCOUNTER — LABORATORY RESULT (OUTPATIENT)
Age: 89
End: 2024-08-13

## 2024-08-19 ENCOUNTER — TRANSCRIPTION ENCOUNTER (OUTPATIENT)
Age: 89
End: 2024-08-19

## 2024-08-19 ENCOUNTER — APPOINTMENT (OUTPATIENT)
Dept: HOME HEALTH SERVICES | Facility: HOME HEALTH | Age: 89
End: 2024-08-19

## 2024-08-19 VITALS
TEMPERATURE: 99.6 F | DIASTOLIC BLOOD PRESSURE: 66 MMHG | OXYGEN SATURATION: 96 % | HEART RATE: 88 BPM | RESPIRATION RATE: 16 BRPM | SYSTOLIC BLOOD PRESSURE: 114 MMHG

## 2024-08-19 DIAGNOSIS — L89.322 PRESSURE ULCER OF LEFT BUTTOCK, STAGE 2: ICD-10-CM

## 2024-08-26 ENCOUNTER — TRANSCRIPTION ENCOUNTER (OUTPATIENT)
Age: 89
End: 2024-08-26

## 2024-08-27 ENCOUNTER — APPOINTMENT (OUTPATIENT)
Dept: HOME HEALTH SERVICES | Facility: HOME HEALTH | Age: 89
End: 2024-08-27

## 2024-08-27 VITALS
RESPIRATION RATE: 17 BRPM | HEART RATE: 84 BPM | TEMPERATURE: 99.5 F | OXYGEN SATURATION: 99 % | DIASTOLIC BLOOD PRESSURE: 60 MMHG | SYSTOLIC BLOOD PRESSURE: 106 MMHG

## 2024-08-27 DIAGNOSIS — L08.9 OTHER INJURY OF UNSPECIFIED BODY REGION, INITIAL ENCOUNTER: ICD-10-CM

## 2024-08-27 DIAGNOSIS — T14.8XXA OTHER INJURY OF UNSPECIFIED BODY REGION, INITIAL ENCOUNTER: ICD-10-CM

## 2024-08-27 RX ORDER — FOAM BANDAGE 8"X5.5"
BANDAGE TOPICAL
Qty: 7 | Refills: 1 | Status: ACTIVE | COMMUNITY
Start: 2024-08-19

## 2024-08-27 RX ORDER — AMOXICILLIN AND CLAVULANATE POTASSIUM 875; 125 MG/1; MG/1
875-125 TABLET, COATED ORAL
Qty: 14 | Refills: 1 | Status: ACTIVE | COMMUNITY
Start: 2024-08-27 | End: 1900-01-01

## 2024-08-30 ENCOUNTER — APPOINTMENT (OUTPATIENT)
Dept: HOME HEALTH SERVICES | Facility: HOME HEALTH | Age: 89
End: 2024-08-30

## 2024-08-30 ENCOUNTER — NON-APPOINTMENT (OUTPATIENT)
Age: 89
End: 2024-08-30

## 2024-08-30 VITALS
SYSTOLIC BLOOD PRESSURE: 110 MMHG | TEMPERATURE: 98.1 F | RESPIRATION RATE: 17 BRPM | OXYGEN SATURATION: 96 % | DIASTOLIC BLOOD PRESSURE: 60 MMHG | HEART RATE: 51 BPM

## 2024-09-04 ENCOUNTER — NON-APPOINTMENT (OUTPATIENT)
Age: 89
End: 2024-09-04

## 2024-09-04 ENCOUNTER — TRANSCRIPTION ENCOUNTER (OUTPATIENT)
Age: 89
End: 2024-09-04

## 2024-09-04 ENCOUNTER — APPOINTMENT (OUTPATIENT)
Dept: HOME HEALTH SERVICES | Facility: HOME HEALTH | Age: 89
End: 2024-09-04

## 2024-09-04 ENCOUNTER — INPATIENT (INPATIENT)
Facility: HOSPITAL | Age: 89
LOS: 6 days | Discharge: HOME HEALTH | DRG: 603 | End: 2024-09-11
Attending: STUDENT IN AN ORGANIZED HEALTH CARE EDUCATION/TRAINING PROGRAM | Admitting: STUDENT IN AN ORGANIZED HEALTH CARE EDUCATION/TRAINING PROGRAM
Payer: MEDICARE

## 2024-09-04 VITALS
HEIGHT: 63 IN | WEIGHT: 100.09 LBS | TEMPERATURE: 98 F | RESPIRATION RATE: 18 BRPM | HEART RATE: 88 BPM | OXYGEN SATURATION: 97 % | SYSTOLIC BLOOD PRESSURE: 102 MMHG | DIASTOLIC BLOOD PRESSURE: 54 MMHG

## 2024-09-04 DIAGNOSIS — S31.000A UNSPECIFIED OPEN WOUND OF LOWER BACK AND PELVIS WITHOUT PENETRATION INTO RETROPERITONEUM, INITIAL ENCOUNTER: ICD-10-CM

## 2024-09-04 DIAGNOSIS — Z96.60 PRESENCE OF UNSPECIFIED ORTHOPEDIC JOINT IMPLANT: Chronic | ICD-10-CM

## 2024-09-04 DIAGNOSIS — E03.9 HYPOTHYROIDISM, UNSPECIFIED: ICD-10-CM

## 2024-09-04 DIAGNOSIS — F03.90 UNSPECIFIED DEMENTIA, UNSPECIFIED SEVERITY, WITHOUT BEHAVIORAL DISTURBANCE, PSYCHOTIC DISTURBANCE, MOOD DISTURBANCE, AND ANXIETY: ICD-10-CM

## 2024-09-04 DIAGNOSIS — Z29.9 ENCOUNTER FOR PROPHYLACTIC MEASURES, UNSPECIFIED: ICD-10-CM

## 2024-09-04 DIAGNOSIS — G93.40 ENCEPHALOPATHY, UNSPECIFIED: ICD-10-CM

## 2024-09-04 DIAGNOSIS — K80.20 CALCULUS OF GALLBLADDER WITHOUT CHOLECYSTITIS WITHOUT OBSTRUCTION: ICD-10-CM

## 2024-09-04 DIAGNOSIS — L03.319 CELLULITIS OF TRUNK, UNSPECIFIED: ICD-10-CM

## 2024-09-04 DIAGNOSIS — Z96.652 PRESENCE OF LEFT ARTIFICIAL KNEE JOINT: Chronic | ICD-10-CM

## 2024-09-04 LAB
ALBUMIN SERPL ELPH-MCNC: 2.9 G/DL — LOW (ref 3.3–5)
ALP SERPL-CCNC: 78 U/L — SIGNIFICANT CHANGE UP (ref 40–120)
ALT FLD-CCNC: 6 U/L — LOW (ref 10–45)
ANION GAP SERPL CALC-SCNC: 12 MMOL/L — SIGNIFICANT CHANGE UP (ref 5–17)
APTT BLD: 29.3 SEC — SIGNIFICANT CHANGE UP (ref 24.5–35.6)
AST SERPL-CCNC: 12 U/L — SIGNIFICANT CHANGE UP (ref 10–40)
BASOPHILS # BLD AUTO: 0.02 K/UL — SIGNIFICANT CHANGE UP (ref 0–0.2)
BASOPHILS NFR BLD AUTO: 0.2 % — SIGNIFICANT CHANGE UP (ref 0–2)
BILIRUB SERPL-MCNC: 0.5 MG/DL — SIGNIFICANT CHANGE UP (ref 0.2–1.2)
BUN SERPL-MCNC: 10 MG/DL — SIGNIFICANT CHANGE UP (ref 7–23)
CALCIUM SERPL-MCNC: 9.4 MG/DL — SIGNIFICANT CHANGE UP (ref 8.4–10.5)
CHLORIDE SERPL-SCNC: 102 MMOL/L — SIGNIFICANT CHANGE UP (ref 96–108)
CO2 SERPL-SCNC: 24 MMOL/L — SIGNIFICANT CHANGE UP (ref 22–31)
CREAT SERPL-MCNC: 0.56 MG/DL — SIGNIFICANT CHANGE UP (ref 0.5–1.3)
EGFR: 88 ML/MIN/1.73M2 — SIGNIFICANT CHANGE UP
EOSINOPHIL # BLD AUTO: 0.09 K/UL — SIGNIFICANT CHANGE UP (ref 0–0.5)
EOSINOPHIL NFR BLD AUTO: 1 % — SIGNIFICANT CHANGE UP (ref 0–6)
ERYTHROCYTE [SEDIMENTATION RATE] IN BLOOD: 73 MM/HR — HIGH (ref 0–20)
FLUAV AG NPH QL: SIGNIFICANT CHANGE UP
FLUBV AG NPH QL: SIGNIFICANT CHANGE UP
GAS PNL BLDV: SIGNIFICANT CHANGE UP
GLUCOSE SERPL-MCNC: 99 MG/DL — SIGNIFICANT CHANGE UP (ref 70–99)
HCT VFR BLD CALC: 35.1 % — SIGNIFICANT CHANGE UP (ref 34.5–45)
HGB BLD-MCNC: 10.9 G/DL — LOW (ref 11.5–15.5)
IMM GRANULOCYTES NFR BLD AUTO: 0.5 % — SIGNIFICANT CHANGE UP (ref 0–0.9)
INR BLD: 1.11 RATIO — SIGNIFICANT CHANGE UP (ref 0.85–1.18)
LYMPHOCYTES # BLD AUTO: 1.67 K/UL — SIGNIFICANT CHANGE UP (ref 1–3.3)
LYMPHOCYTES # BLD AUTO: 19.1 % — SIGNIFICANT CHANGE UP (ref 13–44)
MCHC RBC-ENTMCNC: 27.5 PG — SIGNIFICANT CHANGE UP (ref 27–34)
MCHC RBC-ENTMCNC: 31.1 GM/DL — LOW (ref 32–36)
MCV RBC AUTO: 88.6 FL — SIGNIFICANT CHANGE UP (ref 80–100)
MONOCYTES # BLD AUTO: 0.54 K/UL — SIGNIFICANT CHANGE UP (ref 0–0.9)
MONOCYTES NFR BLD AUTO: 6.2 % — SIGNIFICANT CHANGE UP (ref 2–14)
NEUTROPHILS # BLD AUTO: 6.39 K/UL — SIGNIFICANT CHANGE UP (ref 1.8–7.4)
NEUTROPHILS NFR BLD AUTO: 73 % — SIGNIFICANT CHANGE UP (ref 43–77)
NRBC # BLD: 0 /100 WBCS — SIGNIFICANT CHANGE UP (ref 0–0)
PLATELET # BLD AUTO: 417 K/UL — HIGH (ref 150–400)
POTASSIUM SERPL-MCNC: 4 MMOL/L — SIGNIFICANT CHANGE UP (ref 3.5–5.3)
POTASSIUM SERPL-SCNC: 4 MMOL/L — SIGNIFICANT CHANGE UP (ref 3.5–5.3)
PROT SERPL-MCNC: 7.3 G/DL — SIGNIFICANT CHANGE UP (ref 6–8.3)
PROTHROM AB SERPL-ACNC: 11.6 SEC — SIGNIFICANT CHANGE UP (ref 9.5–13)
RBC # BLD: 3.96 M/UL — SIGNIFICANT CHANGE UP (ref 3.8–5.2)
RBC # FLD: 14.7 % — HIGH (ref 10.3–14.5)
RSV RNA NPH QL NAA+NON-PROBE: SIGNIFICANT CHANGE UP
SARS-COV-2 RNA SPEC QL NAA+PROBE: SIGNIFICANT CHANGE UP
SODIUM SERPL-SCNC: 138 MMOL/L — SIGNIFICANT CHANGE UP (ref 135–145)
WBC # BLD: 8.75 K/UL — SIGNIFICANT CHANGE UP (ref 3.8–10.5)
WBC # FLD AUTO: 8.75 K/UL — SIGNIFICANT CHANGE UP (ref 3.8–10.5)

## 2024-09-04 PROCEDURE — 70450 CT HEAD/BRAIN W/O DYE: CPT | Mod: 26

## 2024-09-04 PROCEDURE — 99223 1ST HOSP IP/OBS HIGH 75: CPT

## 2024-09-04 PROCEDURE — 71045 X-RAY EXAM CHEST 1 VIEW: CPT | Mod: 26

## 2024-09-04 PROCEDURE — 74177 CT ABD & PELVIS W/CONTRAST: CPT | Mod: 26,MC

## 2024-09-04 PROCEDURE — 99348 HOME/RES VST EST LOW MDM 30: CPT

## 2024-09-04 PROCEDURE — 99285 EMERGENCY DEPT VISIT HI MDM: CPT | Mod: FS

## 2024-09-04 RX ORDER — PIPERACILLIN SODIUM AND TAZOBACTAM SODIUM 3; .375 G/15ML; G/15ML
3.38 INJECTION, POWDER, FOR SOLUTION INTRAVENOUS ONCE
Refills: 0 | Status: COMPLETED | OUTPATIENT
Start: 2024-09-04 | End: 2024-09-04

## 2024-09-04 RX ORDER — IBUPROFEN 600 MG
1 TABLET ORAL
Refills: 0 | DISCHARGE

## 2024-09-04 RX ORDER — LEVOTHYROXINE SODIUM 100 MCG
75 TABLET ORAL DAILY
Refills: 0 | Status: DISCONTINUED | OUTPATIENT
Start: 2024-09-04 | End: 2024-09-11

## 2024-09-04 RX ORDER — VANCOMYCIN/0.9 % SOD CHLORIDE 1.75G/25
750 PLASTIC BAG, INJECTION (ML) INTRAVENOUS EVERY 12 HOURS
Refills: 0 | Status: DISCONTINUED | OUTPATIENT
Start: 2024-09-04 | End: 2024-09-05

## 2024-09-04 RX ORDER — PIPERACILLIN SODIUM AND TAZOBACTAM SODIUM 3; .375 G/15ML; G/15ML
3.38 INJECTION, POWDER, FOR SOLUTION INTRAVENOUS EVERY 8 HOURS
Refills: 0 | Status: COMPLETED | OUTPATIENT
Start: 2024-09-04 | End: 2024-09-11

## 2024-09-04 RX ORDER — RISPERIDONE 0.25 MG/1
0.12 TABLET, FILM COATED ORAL DAILY
Refills: 0 | Status: DISCONTINUED | OUTPATIENT
Start: 2024-09-04 | End: 2024-09-11

## 2024-09-04 RX ORDER — RISPERIDONE 0.25 MG/1
0.5 TABLET, FILM COATED ORAL
Refills: 0 | DISCHARGE

## 2024-09-04 RX ORDER — ACETAMINOPHEN 325 MG/1
650 TABLET ORAL EVERY 6 HOURS
Refills: 0 | Status: DISCONTINUED | OUTPATIENT
Start: 2024-09-04 | End: 2024-09-11

## 2024-09-04 RX ORDER — MAGNESIUM, ALUMINUM HYDROXIDE 200-225/5
30 SUSPENSION, ORAL (FINAL DOSE FORM) ORAL EVERY 4 HOURS
Refills: 0 | Status: DISCONTINUED | OUTPATIENT
Start: 2024-09-04 | End: 2024-09-11

## 2024-09-04 RX ORDER — ONDANSETRON 2 MG/ML
4 INJECTION, SOLUTION INTRAMUSCULAR; INTRAVENOUS EVERY 8 HOURS
Refills: 0 | Status: DISCONTINUED | OUTPATIENT
Start: 2024-09-04 | End: 2024-09-11

## 2024-09-04 RX ORDER — ENOXAPARIN SODIUM 100 MG/ML
40 INJECTION SUBCUTANEOUS EVERY 24 HOURS
Refills: 0 | Status: DISCONTINUED | OUTPATIENT
Start: 2024-09-04 | End: 2024-09-11

## 2024-09-04 RX ORDER — FOLIC ACID/MULTIVIT,IRON,MINER 0.4MG-18MG
1 TABLET,CHEWABLE ORAL
Refills: 0 | DISCHARGE

## 2024-09-04 RX ORDER — SODIUM CHLORIDE 9 MG/ML
500 INJECTION INTRAMUSCULAR; INTRAVENOUS; SUBCUTANEOUS ONCE
Refills: 0 | Status: COMPLETED | OUTPATIENT
Start: 2024-09-04 | End: 2024-09-04

## 2024-09-04 RX ORDER — TRAMADOL HYDROCHLORIDE 200 MG/1
1 TABLET, EXTENDED RELEASE ORAL
Refills: 0 | DISCHARGE

## 2024-09-04 RX ADMIN — PIPERACILLIN SODIUM AND TAZOBACTAM SODIUM 200 GRAM(S): 3; .375 INJECTION, POWDER, FOR SOLUTION INTRAVENOUS at 12:48

## 2024-09-04 RX ADMIN — Medication 250 MILLIGRAM(S): at 18:11

## 2024-09-04 RX ADMIN — SODIUM CHLORIDE 500 MILLILITER(S): 9 INJECTION INTRAMUSCULAR; INTRAVENOUS; SUBCUTANEOUS at 14:04

## 2024-09-04 NOTE — ED ADULT TRIAGE NOTE - MODE OF ARRIVAL
[] : No [Yes] : Yes [No falls in past year] : Patient reported no falls in the past year [0] : 2) Feeling down, depressed, or hopeless: Not at all (0) [[id number]] : 0 [HIV test declined] : HIV test declined [Hepatitis C test offered] : Hepatitis C test offered [facility]

## 2024-09-04 NOTE — ED ADULT TRIAGE NOTE - SPO2 (%)
97 Skin Substitute Text: The defect edges were debeveled with a #15 scalpel blade.  Given the location of the defect, shape of the defect and the proximity to free margins a skin substitute graft was deemed most appropriate.  The graft material was trimmed to fit the size of the defect. The graft was then placed in the primary defect and oriented appropriately.

## 2024-09-04 NOTE — ED PROVIDER NOTE - CLINICAL SUMMARY MEDICAL DECISION MAKING FREE TEXT BOX
Medical Decision Making / Differential Diagnosis:  HPI concerning for infection, early sepsis due to sacral decubitus wound.  Patient will need to be admitted for IV antibiotics.  Although patient is DNR/DNI, her famil would like antibiotic therapy.  Patient is already on p.o. antibiotics.  Therefore patient warrants admission for IV antibiotics.    ECG directly visualized by me, shows normal sinus rhythm, rate 94, , QRS 74, QTc 430, no ST elevations or depressions.

## 2024-09-04 NOTE — H&P ADULT - PROBLEM SELECTOR PLAN 6
dvt ppx: Lovenox  diet: soft  dispo: pending dvt ppx: Lovenox  diet: soft  dispo: pending  GOC: DNR/DNI Molst in chart. HCP Julio Churchill

## 2024-09-04 NOTE — ED PROVIDER NOTE - PHYSICAL EXAMINATION
Gen: elderly female. opening eyes and moving arms   Skin: un-stageable large L sacral/buttock decubitus ulcer with surrounding redness and odor.   HEENT: NC/AT, PERRLA, EOMI, MMM  Resp: unlabored CTAB  Cardiac: rrr s1s2,   GI: ND, +BS, Soft, NT

## 2024-09-04 NOTE — H&P ADULT - PROBLEM SELECTOR PLAN 3
On CT abdomen- cholelithiases with hyperdensity in distal cbd (?choledocholithiasis or sludge).   -d/w with HCP niece and wants to hold of on MRCP and see if patient improves with IV abx, wanting to limit invasive interventions.  - Miralax BID and rectal suppository for Large rectal stool with sterocoal proctitis burden seen on CT .    #R/o colon neoplasm  - Focus of soft tissue density inseparable from the sigmoid colon measuring 3.2 x 2.1 cm, also seen on 12/29/202  -d/w niece of findings and does want to pursue additional testing including colonoscopy to rule of malignancy

## 2024-09-04 NOTE — H&P ADULT - ASSESSMENT
89yoF with hx of advanced dementia (AOx1-2), bedbound, hypothyroidism presents with 1 day of AMS admitted for sacral wound infection requiring IV abx.

## 2024-09-04 NOTE — H&P ADULT - NSHPLABSRESULTS_GEN_ALL_CORE
Personally reviewed labs, imaging, ekg                           10.9   8.75  )-----------( 417      ( 04 Sep 2024 12:52 )             35.1       09-04    138  |  102  |  10  ----------------------------<  99  4.0   |  24  |  0.56    Ca    9.4      04 Sep 2024 12:52    TPro  7.3  /  Alb  2.9<L>  /  TBili  0.5  /  DBili  x   /  AST  12  /  ALT  6<L>  /  AlkPhos  78  09-04              Urinalysis Basic - ( 04 Sep 2024 12:52 )    Color: x / Appearance: x / SG: x / pH: x  Gluc: 99 mg/dL / Ketone: x  / Bili: x / Urobili: x   Blood: x / Protein: x / Nitrite: x   Leuk Esterase: x / RBC: x / WBC x   Sq Epi: x / Non Sq Epi: x / Bacteria: x        PT/INR - ( 04 Sep 2024 12:52 )   PT: 11.6 sec;   INR: 1.11 ratio         PTT - ( 04 Sep 2024 12:52 )  PTT:29.3 sec    Lactate Trend            CAPILLARY BLOOD GLUCOSE                Personal interpretation EKG: NSR with qtc <450    < from: CT Abdomen and Pelvis w/ IV Cont (09.04.24 @ 13:37) >      IMPRESSION:  *  Subcutaneous infiltration in the bilateral gluteal region and   overlying the posterior aspect of the bony pelvis, which could reflect   reported cellulitis. Focal area of cutaneous irregularity and small   amount of superficial soft tissue gas in the left gluteal region, which   may represent the known decubitus ulcer. The gas could be due to open   communication to the environment, versus an airforming infection. No   drainable collection.  *  Cholelithiasis. Borderline common bile duct dilation. Hyperdensity in   the distal common bile duct, which may represent choledocholithiasis or   sludge. Consider further evaluation with abdominal MRI/MRCP.  *  Large rectal stool burden. Mild rectal wall thickening and perirectal   fat infiltration, possibly reflecting stercoral proctitis.  *  Colonic diverticulosis. Wall thickening in the sigmoid colon, similar   to the exam of 12/29/2023, potentially due to chronic diverticular   disease or colitis. Focus of soft tissue density inseparable from the   sigmoid colon measuring 3.2 x 2.1 cm, also seen on 12/29/2023; it is   unclear if this is a tract of tissue related to colonic pathology, or a   part of the left adnexa, possibly the left ovary. A neoplastic process is   not entirely excluded.        --- End of Report ---            PILAR BORDEN MD; Attending Radiologist    < end of copied text >

## 2024-09-04 NOTE — H&P ADULT - HISTORY OF PRESENT ILLNESS
89yof pmhx of HTN Dementia (baseline AOx1-2) bed bound, hypothyroidism, brought in by EMS for AMS. Neice at bedside provided colleratal. Patient was getting treated for her sacral wound with PO amoxicillin since 8/29. She was her usual self yesterday prior to bed. This morning, the aides noted she was slow to respond and not talking to family. Daughter noticed slurring of her speech since this morning but has not notice any weakness on either side of her body. Aides/daughter called PCP who advised to take he to the ER for IV abx. Daughter confirms DNR/DNI with minimal invasive interventions but agreeable for IV abx. Patient is bedbound at baseline and has 24hour aides. She does have ep of agitation at home.     In the ED, vitals stable. Given zosyn.

## 2024-09-04 NOTE — ED PROVIDER NOTE - OBJECTIVE STATEMENT
89yof pmhx of HTN Dementia bed bound BIB EMS from home for ams. pt currently on abx for sacral wound but this morning slow to response and not talking to the family. No fever or chills. Niece reports pt with poor appetite. sent to the ER from [facility].

## 2024-09-04 NOTE — ED PROVIDER NOTE - PROGRESS NOTE DETAILS
Patient received on sign out from ABDIRAHMAN Ramirez.   CTAP reveals multiple findings including "Focus of soft tissue density inseparable from the   sigmoid colon measuring 3.2 x 2.1 cm, also seen on 12/29/2023; it is   unclear if this is a tract of tissue related to colonic pathology, or a   part of the left adnexa, possibly the left ovary. A neoplastic process is   not entirely excluded." and stercoral proctitis, cholelithiasis, dilated CBD. Discussed all results with pts neice/health care proxy at bedside. I provided her with copy of all ct scan results. Will admit to medicine for further care. Agreeable with plan. Majo Tatum PA-C

## 2024-09-04 NOTE — ED ADULT NURSE NOTE - CAS EDN DISCHARGE ASSESSMENT
Spine appears normal, range of motion is not limited, no muscle or joint tenderness
Patient baseline mental status

## 2024-09-04 NOTE — H&P ADULT - NSHPPHYSICALEXAM_GEN_ALL_CORE
Vital Signs Last 24 Hrs  T(C): 37.2 (04 Sep 2024 17:14), Max: 37.7 (04 Sep 2024 12:29)  T(F): 98.9 (04 Sep 2024 17:14), Max: 99.8 (04 Sep 2024 12:29)  HR: 76 (04 Sep 2024 17:14) (76 - 88)  BP: 100/57 (04 Sep 2024 17:14) (100/57 - 102/54)  BP(mean): --  RR: 20 (04 Sep 2024 17:14) (18 - 20)  SpO2: 96% (04 Sep 2024 17:14) (96% - 97%)    CONSTITUTIONAL: Frail, cachetic, in no apparent distress, staring into the ceiling  EYES: No conjunctival or scleral injection, non-icteric  ENMT: dry oral mucosa  RESPIRATORY: Breathing comfortably; lungs CTA without wheeze/rhonchi/rales  CARDIOVASCULAR: +S1S2, RRR,  no lower extremity edema  GASTROINTESTINAL: ?RUQ/epigastric tenderness (patient swatting away). No rebound or guarding  SKIN: Large deep sacral wound, foul smelling with greenish discharge. Lateral sides with eschar  NEUROLOGIC: facial symmetry; difficult to comphrend speech; moving all extremities spontaneously   PSYCHIATRIC: Aox0, eyes open but not conversing

## 2024-09-04 NOTE — ED CLERICAL - DIVISION
Hospitalist Progress Note      SYNOPSIS: Patient admitted on 2022 for     62years old male patient who has history of hypertension diabetes and daily alcohol intake, consuming 12 pack every day, patient apparently had multiple episodes of syncope at home he was brought into the emergency department CT head was negative CT chest was negative for pulmonary embolism but showed multifocal pneumonia bilaterally, patient respiratory viral panel including COVID-19 testing was negative. Patient was noted to have severe hyponatremia. He was requiring high flow nasal cannula in emergency department, initial sodium was 123 patient was given D5W normal saline emergency department, repeat sodium was 119, patient is being admitted to intensive care unit on hospital medicine service    SUBJECTIVE:    Patient seen and examined in the ICU. Patient alert awake oriented x2-3. Resting comfortably. Denies any chest pain, nausea, vomiting. Records reviewed. Stable overnight. No other overnight issues reported. Temp (24hrs), Av.9 °F (37.2 °C), Min:98.3 °F (36.8 °C), Max:99.3 °F (37.4 °C)    DIET: ADULT DIET; Regular; 5 carb choices (75 gm/meal); High Fiber; 1000 ml  CODE: Full Code    Intake/Output Summary (Last 24 hours) at 2022 0750  Last data filed at 2022 0716  Gross per 24 hour   Intake 1485.32 ml   Output 625 ml   Net 860.32 ml       OBJECTIVE:    /71   Pulse 83   Temp 98.9 °F (37.2 °C) (Oral)   Resp 19   Ht 6' (1.829 m)   Wt 202 lb 9.6 oz (91.9 kg)   SpO2 99%   BMI 27.48 kg/m²     General appearance: No apparent distress, appears stated age and cooperative. HEENT:  Conjunctivae/corneas clear. Neck: Supple. No jugular venous distention. Respiratory: Clear to auscultation bilaterally, normal respiratory effort  Cardiovascular: Regular rate rhythm, normal S1-S2  Abdomen: Soft, nontender, nondistended  Musculoskeletal: No clubbing, cyanosis, no bilateral lower extremity edema. Brisk capillary refill.    Skin:  No rashes  on visible skin  Neurologic: awake, alert and following commands     ASSESSMENT & PLAN:    Severe hyponatremia  Likely beer potomania  Patient initial sodium 123-most recent sodium 119  Serum osmolality 254, urine osmolality 289  Urine sodium 23, urine chloride less than 20  Management as per nephrology     Sepsis with Hypoxic respiratory failure  With possible aspiration pneumonia  CT chest negative for pulmonary embolism, it did show multifocal bilateral groundglass pneumonia prominent within the left upper lobe and left lower lobe, also showed 5 mm pleural-based nodule seen within the left lower lobe  COVID-19 testing negative  Patient was given 1 dose of Rocephin and doxycycline and dexamethasone in emergency department  Currently on Unasyn      Syncope  Multiple episodes prior to coming to the hospital  Differential diagnosis: Hyponatremia, seizure, syncope due to dehydration, in the setting of sepsis, rule out cardiac/cardiovascular etiology  CT head negative for any acute findings  CT chest negative for pulmonary embolism  Will request echocardiogram and carotid artery ultrasound  Will consult cardiology  Will consult neurology     Electrolyte abnormality likely due to excessive alcohol intake  Hypokalemia  Hypomagnesemia  Continue to monitor, replace as needed     Lactic acidosis  Likely due to dehydration  Initial lactic acid 4.5 on presentation, repeat lactic acid improved at 1.9     Daily alcohol use  Patient used 12 pack every day  CIWA protocol for alcohol withdrawal     Elevated troponin  Flat trend  Cant exclude ACS  Check echo  Cardio consult      History of coronary artery disease  At home on Toprol 50/day, aspirin 81 mg a day, Lipitor 40 mg a day, lisinopril-hydrochlorothiazide 20/25     History of diabetes  Patient on metformin 500 mg a day  We will hold metformin while inpatient  Continues on sliding scale  Diabetic diet     History of [facility]... hypertension  Patient on Toprol 50, lisinopril-hydrochlorothiazide 20/25/day, Norvasc 10 mg/day  All blood pressure medications on hold for now    Pulmonary nodule  5 mm pleural-based nodule  Outpatient follow-up    DISPOSITION:   Unclear discharge disposition at the moment.     Medications:  REVIEWED DAILY    Infusion Medications    sodium chloride      dextrose       Scheduled Medications    folic acid  1 mg Oral Daily    thiamine  100 mg Oral Daily    sodium chloride flush  5-40 mL IntraVENous 2 times per day    multivitamin  1 tablet Oral Daily    sodium chloride flush  5-40 mL IntraVENous 2 times per day    sodium chloride flush  5-40 mL IntraVENous 2 times per day    enoxaparin  40 mg SubCUTAneous Daily    ampicillin-sulbactam  3,000 mg IntraVENous Q6H    sodium chloride flush  5-40 mL IntraVENous 2 times per day    amLODIPine  10 mg Oral QAM    atorvastatin  40 mg Oral QAM    Vitamin D  1,000 Units Oral QAM    metoprolol succinate  50 mg Oral QAM    insulin lispro  0-4 Units SubCUTAneous TID WC    insulin lispro  0-4 Units SubCUTAneous Nightly     PRN Meds: sodium chloride flush, LORazepam **OR** LORazepam **OR** LORazepam **OR** LORazepam **OR** LORazepam **OR** LORazepam **OR** LORazepam **OR** LORazepam, sodium chloride flush, sodium chloride flush, sodium chloride flush, sodium chloride, ondansetron **OR** ondansetron, polyethylene glycol, acetaminophen **OR** acetaminophen, glucose, dextrose bolus **OR** dextrose bolus, glucagon (rDNA), dextrose    Labs:     Recent Labs     11/26/22  1446 11/27/22  0425   WBC 9.4 9.3   HGB 15.6 13.4   HCT 42.3 36.2*   * 97*       Recent Labs     11/26/22  1948 11/27/22  0053 11/27/22  0425   * 121* 124*   K 3.2* 3.7 3.6   CL 81* 81* 84*   CO2 22 26 29   BUN 11 13 12   CREATININE 1.0 1.1 1.0   CALCIUM 8.2* 8.7 9.0       Recent Labs     11/26/22  1446   PROT 8.7*   ALKPHOS 138*   ALT 77*   AST 83*   BILITOT 1.6*   LIPASE 98*       No results for input(s): INR in the last 72 hours. No results for input(s): Joselin Zandra in the last 72 hours. Chronic labs:    Lab Results   Component Value Date    CHOL 204 (H) 01/17/2012    TRIG 206 (H) 01/17/2012    HDL 39.5 (A) 01/17/2012    LDLCALC 123 (H) 01/17/2012    TSH 0.577 11/27/2022    INR 1.1 01/16/2012    LABA1C 5.8 (H) 11/27/2022       Radiology: REVIEWED DAILY    +++++++++++++++++++++++++++++++++++++++++++++++++  Tawanda Fritz MD  [facility] - [address] [place] [address] Rd, [address]  +++++++++++++++++++++++++++++++++++++++++++++++++  NOTE: This report was transcribed using voice recognition software. Every effort was made to ensure accuracy; however, inadvertent computerized transcription errors may be present.

## 2024-09-04 NOTE — H&P ADULT - CONVERSATION DETAILS
Niece confirm DNR/DNI and agrees with IV abx. Discussed results from CT abdomen. Given patient's dementia, unclear if she is having symptoms of gallstones/obstructive cbd. MRCP would help further clarify but Niece would like to hold off and see if patient improves with IV abx. Also discussed soft tissue density near sigmoid colon in which neoplasm cannot be ruled out. Niece expressed that she would not want patient to undergo colonoscopy given her age.

## 2024-09-04 NOTE — H&P ADULT - PROBLEM SELECTOR PLAN 1
Baseline AOx1-2, conversant with family. On the morning of 9/4, patient more lethargic and slurred speech. AMS 2/2 infection, lower suspicion for stroke. Outside time window for tpa. No focal neurologic deficits  -check CTH   -f/u blood cultures  -vancyomcyin (confirmed with family, no prior hx of allergy to vanc) and zosyn  -limit tramadol   -see below

## 2024-09-04 NOTE — ED ADULT NURSE NOTE - NSICDXPASTMEDICALHX_GEN_ALL_CORE_FT
PAST MEDICAL HISTORY:  Arthritis     Dementia, senile     Former smoker, stopped smoking many years ago     Hernia, hiatal     Hypothyroid     Hypothyroidism, unspecified type     Spondylitis     Stomach ulcer

## 2024-09-04 NOTE — ED ADULT NURSE NOTE - OBJECTIVE STATEMENT
1125 pt 89yf per ems/bib/jaylyn, hospice pt at home being treated for antibx on multi ulcers, dnr/dni, non verbal, dx dementia inc altered mental status per family, pt vitals wnl, pt pending eval

## 2024-09-04 NOTE — H&P ADULT - PROBLEM SELECTOR PLAN 2
Large deep sacral wound with greenish discharge and foul smelling. Eschar sounding sacral wound  -CT ab- Subq infiltration in the bilateral gluteal region and overlying the posterior aspect of the bony pelvis likely cellulitis. +decubitus ulcer.  -start vancomycin and zosyn  -wound culture if able  -wound care consult  -ID consult AM  -f/u blood cultures  -MRSA PCR

## 2024-09-04 NOTE — ED PROVIDER NOTE - ATTENDING APP SHARED VISIT CONTRIBUTION OF CARE
Emergency Medicine Attending MD Baca:  patient seen and evaluated with the PA.  I was present for key portions of the History and Physical, and I agree with the Impression and Plan.      Patient is a 89-year-old female, brought in by EMS for evaluation of decreased mental status, concern for decubitus wound infections.  Medical history significant for dementia, gets 24/7 home care.  Here with her niece who is her power of  and health proxy patient is (DNR/DNI).   Patient has unknown sacral decubitus ulcer, early developing left heel ulcer.  Has been on PO amoxicillin.  Patient was noted to be less active this morning, and was brought to the ED by ambulance.    Baseline mental status is AAO x 1-2, but is usually very talkabitve.  However, today seemed much less active.      VS: wnl  Gen: Elderly female, fatigued appearing, sleeping.  Head: NC/AT  Neck: trachea midline  Resp:  No distress  CV: RRR, no RMG  Abd: nondistended, soft, nontender  Ext: no deformities  Neuro:  A&Ox1 appears non focal  Skin: 10 cm stage IV sacral decubitus ulcer appreciated.  + Malodorous   Unable to 3 cm left heel stage II decubitus ulcer present.  Psych: Unable to assess    Medical Decision Making / Differential Diagnosis:  HPI concerning for infection, early sepsis due to sacral decubitus wound.  Patient will need to be admitted for IV antibiotics.  Although patient is DNR/DNI, her famil would like antibiotic therapy.  Patient is already on p.o. antibiotics.  Therefore patient warrants admission for IV antibiotics. Emergency Medicine Attending MD Baca:  patient seen and evaluated with the PA.  I was present for key portions of the History and Physical, and I agree with the Impression and Plan.      Patient is a 89-year-old female, brought in by EMS for evaluation of decreased mental status, concern for decubitus wound infections.  Medical history significant for dementia, gets 24/7 home care.  Here with her niece who is her power of  and health proxy patient is (DNR/DNI).   Patient has unknown sacral decubitus ulcer, early developing left heel ulcer.  Has been on PO amoxicillin.  Patient was noted to be less active this morning, and was brought to the ED by ambulance.    Baseline mental status is AAO x 1-2, but is usually very talkabitve.  However, today seemed much less active.      VS: wnl  Gen: Elderly female, fatigued appearing, sleeping.  Head: NC/AT  Neck: trachea midline  Resp:  No distress  CV: RRR, no RMG  Abd: nondistended, soft, nontender  Ext: no deformities  Neuro:  A&Ox1 appears non focal  Skin: 10 cm stage IV sacral decubitus ulcer appreciated.  + Malodorous   Unable to 3 cm left heel stage II decubitus ulcer present.  Psych: Unable to assess    Medical Decision Making / Differential Diagnosis:  HPI concerning for infection, early sepsis due to sacral decubitus wound.  Patient will need to be admitted for IV antibiotics.  Although patient is DNR/DNI, her famil would like antibiotic therapy.  Patient is already on p.o. antibiotics.  Therefore patient warrants admission for IV antibiotics.    ECG directly visualized by me, shows normal sinus rhythm, rate 94, , QRS 74, QTc 430, no ST elevations or depressions. Emergency Medicine Attending MD Baca:  patient seen and evaluated with the PA.  I was present for key portions of the History and Physical, and I agree with the Impression and Plan.      Patient is a 89-year-old female, brought in by EMS for evaluation of decreased mental status, concern for decubitus wound infections.  Medical history significant for dementia, gets 24/7 home care.  Here with her niece who is her power of  and health proxy patient is (DNR/DNI).   Patient has unknown sacral decubitus ulcer, early developing left heel ulcer.  Has been on PO amoxicillin.  Patient was noted to be less active this morning, and was brought to the ED by ambulance.    Baseline mental status is AAO x 1-2, but is usually very talkative.  However, today seemed much less active.      VS: wnl  Gen: Elderly female, fatigued appearing, sleeping.  Head: NC/AT  Neck: trachea midline  Resp:  No distress  CV: RRR, no RMG  Abd: nondistended, soft, nontender  Ext: no deformities  Neuro:  A&Ox1 appears non focal  Skin: 10 cm stage IV sacral decubitus ulcer appreciated.  + Malodorous   Unable to 3 cm left heel stage II decubitus ulcer present.  Psych: Unable to assess    Medical Decision Making / Differential Diagnosis:  HPI concerning for infection, early sepsis due to sacral decubitus wound.  Patient will need to be admitted for IV antibiotics.  Although patient is DNR/DNI, her famil would like antibiotic therapy.  Patient is already on p.o. antibiotics.  Therefore patient warrants admission for IV antibiotics.    ECG directly visualized by me, shows normal sinus rhythm, rate 94, , QRS 74, QTc 430, no ST elevations or depressions.

## 2024-09-04 NOTE — ED ADULT TRIAGE NOTE - CHIEF COMPLAINT QUOTE
Periods of altered mental status, given pain medication at approximately 1am this morning, woke up at 6 am "less responsive" than normal. Multiple wounds being treated with antibiotics.

## 2024-09-04 NOTE — H&P ADULT - NSHPADDITIONALINFOADULT_GEN_ALL_CORE
d/w [place]    90 minutes spent  Kandy Dave MD  Division of Hospital Medicine  Available on [employer] Teams

## 2024-09-05 LAB
ALBUMIN SERPL ELPH-MCNC: 2.7 G/DL — LOW (ref 3.3–5)
ALP SERPL-CCNC: 75 U/L — SIGNIFICANT CHANGE UP (ref 40–120)
ALT FLD-CCNC: 8 U/L — LOW (ref 10–45)
ANION GAP SERPL CALC-SCNC: 12 MMOL/L — SIGNIFICANT CHANGE UP (ref 5–17)
AST SERPL-CCNC: 13 U/L — SIGNIFICANT CHANGE UP (ref 10–40)
BASOPHILS # BLD AUTO: 0.03 K/UL — SIGNIFICANT CHANGE UP (ref 0–0.2)
BASOPHILS NFR BLD AUTO: 0.4 % — SIGNIFICANT CHANGE UP (ref 0–2)
BILIRUB SERPL-MCNC: 0.6 MG/DL — SIGNIFICANT CHANGE UP (ref 0.2–1.2)
BUN SERPL-MCNC: 8 MG/DL — SIGNIFICANT CHANGE UP (ref 7–23)
CALCIUM SERPL-MCNC: 9.3 MG/DL — SIGNIFICANT CHANGE UP (ref 8.4–10.5)
CHLORIDE SERPL-SCNC: 105 MMOL/L — SIGNIFICANT CHANGE UP (ref 96–108)
CO2 SERPL-SCNC: 21 MMOL/L — LOW (ref 22–31)
CREAT SERPL-MCNC: 0.57 MG/DL — SIGNIFICANT CHANGE UP (ref 0.5–1.3)
EGFR: 87 ML/MIN/1.73M2 — SIGNIFICANT CHANGE UP
EOSINOPHIL # BLD AUTO: 0.08 K/UL — SIGNIFICANT CHANGE UP (ref 0–0.5)
EOSINOPHIL NFR BLD AUTO: 1 % — SIGNIFICANT CHANGE UP (ref 0–6)
GLUCOSE SERPL-MCNC: 93 MG/DL — SIGNIFICANT CHANGE UP (ref 70–99)
HCT VFR BLD CALC: 36.2 % — SIGNIFICANT CHANGE UP (ref 34.5–45)
HGB BLD-MCNC: 10.7 G/DL — LOW (ref 11.5–15.5)
IMM GRANULOCYTES NFR BLD AUTO: 0.4 % — SIGNIFICANT CHANGE UP (ref 0–0.9)
LACTATE SERPL-SCNC: 3.2 MMOL/L — HIGH (ref 0.5–2)
LYMPHOCYTES # BLD AUTO: 1.82 K/UL — SIGNIFICANT CHANGE UP (ref 1–3.3)
LYMPHOCYTES # BLD AUTO: 22.7 % — SIGNIFICANT CHANGE UP (ref 13–44)
MCHC RBC-ENTMCNC: 27.1 PG — SIGNIFICANT CHANGE UP (ref 27–34)
MCHC RBC-ENTMCNC: 29.6 GM/DL — LOW (ref 32–36)
MCV RBC AUTO: 91.6 FL — SIGNIFICANT CHANGE UP (ref 80–100)
MONOCYTES # BLD AUTO: 0.6 K/UL — SIGNIFICANT CHANGE UP (ref 0–0.9)
MONOCYTES NFR BLD AUTO: 7.5 % — SIGNIFICANT CHANGE UP (ref 2–14)
MRSA PCR RESULT.: SIGNIFICANT CHANGE UP
NEUTROPHILS # BLD AUTO: 5.46 K/UL — SIGNIFICANT CHANGE UP (ref 1.8–7.4)
NEUTROPHILS NFR BLD AUTO: 68 % — SIGNIFICANT CHANGE UP (ref 43–77)
NRBC # BLD: 0 /100 WBCS — SIGNIFICANT CHANGE UP (ref 0–0)
PLATELET # BLD AUTO: 359 K/UL — SIGNIFICANT CHANGE UP (ref 150–400)
POTASSIUM SERPL-MCNC: 4.6 MMOL/L — SIGNIFICANT CHANGE UP (ref 3.5–5.3)
POTASSIUM SERPL-SCNC: 4.6 MMOL/L — SIGNIFICANT CHANGE UP (ref 3.5–5.3)
PROT SERPL-MCNC: 6.8 G/DL — SIGNIFICANT CHANGE UP (ref 6–8.3)
RBC # BLD: 3.95 M/UL — SIGNIFICANT CHANGE UP (ref 3.8–5.2)
RBC # FLD: 15 % — HIGH (ref 10.3–14.5)
S AUREUS DNA NOSE QL NAA+PROBE: DETECTED
SODIUM SERPL-SCNC: 138 MMOL/L — SIGNIFICANT CHANGE UP (ref 135–145)
TSH SERPL-MCNC: 3.62 UIU/ML — SIGNIFICANT CHANGE UP (ref 0.27–4.2)
WBC # BLD: 8.02 K/UL — SIGNIFICANT CHANGE UP (ref 3.8–10.5)
WBC # FLD AUTO: 8.02 K/UL — SIGNIFICANT CHANGE UP (ref 3.8–10.5)

## 2024-09-05 PROCEDURE — 99222 1ST HOSP IP/OBS MODERATE 55: CPT | Mod: GC

## 2024-09-05 PROCEDURE — 99221 1ST HOSP IP/OBS SF/LOW 40: CPT

## 2024-09-05 PROCEDURE — 99232 SBSQ HOSP IP/OBS MODERATE 35: CPT

## 2024-09-05 RX ORDER — POLYETHYLENE GLYCOL 3350 17 G/17G
17 POWDER, FOR SOLUTION ORAL
Refills: 0 | Status: DISCONTINUED | OUTPATIENT
Start: 2024-09-05 | End: 2024-09-08

## 2024-09-05 RX ORDER — VANCOMYCIN/0.9 % SOD CHLORIDE 1.75G/25
500 PLASTIC BAG, INJECTION (ML) INTRAVENOUS EVERY 24 HOURS
Refills: 0 | Status: DISCONTINUED | OUTPATIENT
Start: 2024-09-06 | End: 2024-09-07

## 2024-09-05 RX ORDER — SENNA 187 MG
2 TABLET ORAL AT BEDTIME
Refills: 0 | Status: DISCONTINUED | OUTPATIENT
Start: 2024-09-05 | End: 2024-09-08

## 2024-09-05 RX ORDER — CHLORHEXIDINE GLUCONATE 40 MG/ML
1 SOLUTION TOPICAL DAILY
Refills: 0 | Status: DISCONTINUED | OUTPATIENT
Start: 2024-09-05 | End: 2024-09-11

## 2024-09-05 RX ADMIN — ENOXAPARIN SODIUM 40 MILLIGRAM(S): 100 INJECTION SUBCUTANEOUS at 18:42

## 2024-09-05 RX ADMIN — Medication 2 TABLET(S): at 22:42

## 2024-09-05 RX ADMIN — PIPERACILLIN SODIUM AND TAZOBACTAM SODIUM 25 GRAM(S): 3; .375 INJECTION, POWDER, FOR SOLUTION INTRAVENOUS at 10:32

## 2024-09-05 RX ADMIN — CHLORHEXIDINE GLUCONATE 1 APPLICATION(S): 40 SOLUTION TOPICAL at 15:29

## 2024-09-05 RX ADMIN — PIPERACILLIN SODIUM AND TAZOBACTAM SODIUM 25 GRAM(S): 3; .375 INJECTION, POWDER, FOR SOLUTION INTRAVENOUS at 18:42

## 2024-09-05 RX ADMIN — PIPERACILLIN SODIUM AND TAZOBACTAM SODIUM 25 GRAM(S): 3; .375 INJECTION, POWDER, FOR SOLUTION INTRAVENOUS at 01:39

## 2024-09-05 RX ADMIN — POLYETHYLENE GLYCOL 3350 17 GRAM(S): 17 POWDER, FOR SOLUTION ORAL at 18:47

## 2024-09-05 RX ADMIN — Medication 250 MILLIGRAM(S): at 06:12

## 2024-09-05 RX ADMIN — RISPERIDONE 0.12 MILLIGRAM(S): 0.25 TABLET, FILM COATED ORAL at 18:41

## 2024-09-05 RX ADMIN — Medication 10 MILLIGRAM(S): at 15:29

## 2024-09-05 NOTE — PATIENT PROFILE ADULT - FALL HARM RISK - HARM RISK INTERVENTIONS

## 2024-09-05 NOTE — PATIENT PROFILE ADULT - VISION (WITH CORRECTIVE LENSES IF THE PATIENT USUALLY WEARS THEM):
Review of Systems/Medical History  Patient summary reviewed  Chart reviewed      Cardiovascular  EKG reviewed, Exercise tolerance: good,     Pulmonary  Negative pulmonary ROS ,        GI/Hepatic  Negative GI/hepatic ROS          Negative  ROS        Endo/Other  Negative endo/other ROS      GYN  Negative gynecology ROS          Hematology  Negative hematology ROS      Musculoskeletal       Neurology  Negative neurology ROS      Psychology   Negative psychology ROS            Physical Exam    Airway    Mallampati score: II  TM Distance: <3 FB  Neck ROM: full     Dental   No notable dental hx     Cardiovascular  Rhythm: regular, Rate: normal,     Pulmonary  Breath sounds clear to auscultation,     Other Findings        Anesthesia Plan  ASA Score- 2       Anesthesia Type- general with ASA Monitors  Additional Monitors:   Airway Plan:           Induction- intravenous  Informed Consent- Anesthetic plan and risks discussed with patient  Partially impaired: cannot see medication labels or newsprint, but can see obstacles in path, and the surrounding layout; can count fingers at arm's length

## 2024-09-05 NOTE — CONSULT NOTE ADULT - ATTENDING COMMENTS
89F with HTN, Dementia (baseline AOx1-2), hypothyroidism who was hospitalized 12/2023 after a fall.  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  Was in rehab for 3 months and discharged April with house calls program.  Over the past month, she had decreased PO intake and developed b/l decubitus ulcers buttocks.  Constripation.  Has 24 hour care.  Niece noted patient to be unresponsive 9/4 and patient was brought to ED by EMS.  No fever.  No leukocytosis.  Normal differential.  Decubitus noted with erythema on the right buttock.  Started on vancomycin / zosyn.  ESR 70s.  CRP 30s.  CT without findings of bony erosion.    Buttock decubitus  - superficially infected  - no evidence of sepsis  - can decrease vancomycin to 500mg IV daily  - can continue zosyn  - f/u cultures, if no MRSA, d/c vancomycin  - hope for a short course only  - needs wound care evaluation

## 2024-09-05 NOTE — PROGRESS NOTE ADULT - SUBJECTIVE AND OBJECTIVE BOX
[facility] Division of Hospital Medicine  Daphnie Monique MD  Available on Teams Jatin    Patient is a 89y old  Female who presents with a chief complaint of ams for 1 day (05 Sep 2024 10:14)      SUBJECTIVE / OVERNIGHT EVENTS:  Patient evaluated at bedside with niece present. Denies any acute pain currently, per niece mentation varies, is able to recognize family, answer questions appropriately but with impaired short-term recall. Denies any f/c, CP, SOB initially denies abd pain but with slight grimacing on palpation of R abd.     ADDITIONAL REVIEW OF SYSTEMS: negative    MEDICATIONS  (STANDING):  bisacodyl Suppository 10 milliGRAM(s) Rectal once  chlorhexidine 2% Cloths 1 Application(s) Topical daily  enoxaparin Injectable 40 milliGRAM(s) SubCutaneous every 24 hours  levothyroxine 75 MICROGram(s) Oral daily  piperacillin/tazobactam IVPB.. 3.375 Gram(s) IV Intermittent every 8 hours  polyethylene glycol 3350 17 Gram(s) Oral two times a day  risperiDONE   Tablet 0.125 milliGRAM(s) Oral daily  vancomycin  IVPB 500 milliGRAM(s) IV Intermittent every 24 hours    MEDICATIONS  (PRN):  acetaminophen     Tablet .. 650 milliGRAM(s) Oral every 6 hours PRN Temp greater or equal to 38C (100.4F), Mild Pain (1 - 3)  aluminum hydroxide/magnesium hydroxide/simethicone Suspension 30 milliLiter(s) Oral every 4 hours PRN Dyspepsia  melatonin 3 milliGRAM(s) Oral at bedtime PRN Insomnia  ondansetron Injectable 4 milliGRAM(s) IV Push every 8 hours PRN Nausea and/or Vomiting      CAPILLARY BLOOD GLUCOSE        I&O's Summary      PHYSICAL EXAM:  Vital Signs Last 24 Hrs  T(C): 36.3 (05 Sep 2024 13:13), Max: 37.2 (04 Sep 2024 17:14)  T(F): 97.4 (05 Sep 2024 13:13), Max: 99 (05 Sep 2024 05:16)  HR: 76 (05 Sep 2024 13:13) (67 - 100)  BP: 95/64 (05 Sep 2024 13:13) (92/62 - 108/74)  BP(mean): --  RR: 18 (05 Sep 2024 13:13) (17 - 20)  SpO2: 95% (05 Sep 2024 13:13) (95% - 98%)    Parameters below as of 05 Sep 2024 13:13  Patient On (Oxygen Delivery Method): room air        CONSTITUTIONAL: Frail, resting in bed comfortably, in no apparent distress  EYES: No conjunctival or scleral injection, non-icteric; PERRLA and symmetric  ENMT: Dry oral mucosa   RESPIRATORY: Breathing comfortably; lungs CTA without wheeze/rhonchi/rales  CARDIOVASCULAR: +S1S2, RRR, no M/G/R; pedal pulses full and symmetric; no lower extremity edema  GASTROINTESTINAL: With slight R TTP (pt grimacing although verbally denies), soft, +BS throughout, no rebound/guarding  MUSCULOSKELETAL: no peripheral edema  SKIN: Patient/family declining current examination; but with reported deep sacral wounds with escharing  NEUROLOGIC: CN II-XII intact; sensation intact in LEs b/l to light touch  PSYCHIATRIC: A+O to self, location (able to tell writer she is in hospital), answering questions appropriately though [place] (per [place] this is around baseline)    LABS:                        10.7   8.02  )-----------( 359      ( 05 Sep 2024 07:05 )             36.2     09-05    138  |  105  |  8   ----------------------------<  93  4.6   |  21<L>  |  0.57    Ca    9.3      05 Sep 2024 07:05    TPro  6.8  /  Alb  2.7<L>  /  TBili  0.6  /  DBili  x   /  AST  13  /  ALT  8<L>  /  AlkPhos  75  09-05    PT/INR - ( 04 Sep 2024 12:52 )   PT: 11.6 sec;   INR: 1.11 ratio         PTT - ( 04 Sep 2024 12:52 )  PTT:29.3 sec      Urinalysis Basic - ( 05 Sep 2024 07:05 )    Color: x / Appearance: x / SG: x / pH: x  Gluc: 93 mg/dL / Ketone: x  / Bili: x / Urobili: x   Blood: x / Protein: x / Nitrite: x   Leuk Esterase: x / RBC: x / WBC x   Sq Epi: x / Non Sq Epi: x / Bacteria: x          RADIOLOGY & ADDITIONAL TESTS:  Results Reviewed:   Imaging Personally Reviewed:  Electrocardiogram Personally Reviewed:    COORDINATION OF CARE:  Care Discussed with Consultants/Other Providers [Y/N]:  Prior or Outpatient Records Reviewed [Y/N]:

## 2024-09-05 NOTE — CONSULT NOTE ADULT - SUBJECTIVE AND OBJECTIVE BOX
Wound SURGERY CONSULT NOTE    HPI:   89yof pmhx of HTN Dementia (baseline AOx1-2) bed bound, hypothyroidism, brought in by EMS for AMS. Nemakayla at bedside provided colleratal. Patient was getting treated for her sacral wound with PO amoxicillin since 8/29. She was her usual self yesterday prior to bed. This morning, the aides noted she was slow to respond and not talking to family. Daughter noticed slurring of her speech since this morning but has not notice any weakness on either side of her body. Aides/daughter called PCP who advised to take he to the ER for IV abx. Daughter confirms DNR/DNI with minimal invasive interventions but agreeable for IV abx. Patient is bedbound at baseline and has 24hour aides. She does have ep of agitation at home.     In the ED, vitals stable. Given zosyn.  (04 Sep 2024 17:49)      Wound consult requested by team to assist w/ management of bilateral buttocks pressure injury.   Pt unable to  c/o pain, drainage, odor, color change,  or worsening swelling. Offloading and pericare initiated upon admission as pt Increasingly sedentary 2/2 to illness. Pt is Incontinent of urine & stool.       Current Diet:     PAST MEDICAL & SURGICAL HISTORY:  Hypothyroid      Arthritis      Stomach ulcer      Hernia, hiatal      Spondylitis      Former smoker, stopped smoking many years ago      Hypothyroidism, unspecified type      Dementia, senile      History of bilateral hip replacements      Status post left knee replacement      No significant past surgical history          REVIEW OF SYSTEMS: Pt unable to offer      MEDICATIONS  (STANDING):  chlorhexidine 2% Cloths 1 Application(s) Topical daily  enoxaparin Injectable 40 milliGRAM(s) SubCutaneous every 24 hours  levothyroxine 75 MICROGram(s) Oral daily  piperacillin/tazobactam IVPB.. 3.375 Gram(s) IV Intermittent every 8 hours  polyethylene glycol 3350 17 Gram(s) Oral two times a day  risperiDONE   Tablet 0.125 milliGRAM(s) Oral daily  senna 2 Tablet(s) Oral at bedtime  vancomycin  IVPB 500 milliGRAM(s) IV Intermittent every 24 hours    MEDICATIONS  (PRN):  acetaminophen     Tablet .. 650 milliGRAM(s) Oral every 6 hours PRN Temp greater or equal to 38C (100.4F), Mild Pain (1 - 3)  aluminum hydroxide/magnesium hydroxide/simethicone Suspension 30 milliLiter(s) Oral every 4 hours PRN Dyspepsia  melatonin 3 milliGRAM(s) Oral at bedtime PRN Insomnia  ondansetron Injectable 4 milliGRAM(s) IV Push every 8 hours PRN Nausea and/or Vomiting      Allergies    No Known Allergies    Intolerances        SOCIAL HISTORY:    Former smoker, No current  ETOH, drugs    FAMILY HISTORY:    No pertinent family history in first degree relatives      PHYSICAL EXAM:  Vital Signs Last 24 Hrs  T(C): 36.3 (05 Sep 2024 13:13), Max: 37.2 (05 Sep 2024 05:16)  T(F): 97.4 (05 Sep 2024 13:13), Max: 99 (05 Sep 2024 05:16)  HR: 76 (05 Sep 2024 13:13) (67 - 100)  BP: 95/64 (05 Sep 2024 13:13) (92/62 - 108/74)  BP(mean): --  RR: 18 (05 Sep 2024 13:13) (17 - 20)  SpO2: 95% (05 Sep 2024 13:13) (95% - 98%)    Parameters below as of 05 Sep 2024 13:13  Patient On (Oxygen Delivery Method): room air        NAD,  Alert/ Confused/ thin/ frail,  Versa Care P500   HEENT:   sclera clear, mucosa moist, throat clear, trachea midline, neck supple  Respiratory: nonlabored w/ equal chest rise  Gastrointestinal: soft NT/ND   : (+)purewick  Neurology:  nonverbal, ncan not follow commands  Psych: easily agitated  Musculoskeletal:  no deformities/ contractures  Vascular: BLE equally warm, no cyanosis, clubbing, edema nor acute ischemia  lt heel deep tissue pressure injury (DTPI) 2.0cm x 2.0cm x 0.0cm        drainage  No odor, erythema, increased warmth, tenderness, induration, fluctuance, nor crepitus  Skin:  thin, dry, pale, frail  Rt buttock unstageable pressure injury 4.0cm x 8.0cm x 0.2cm 80%  yellow slough        20% red tissue  Lt buttock unstageable pressure injury with clusters of evolving DTPIs (+) epidermal sloughing      10.5cm x 13,2cm x 2.0cm circumferential undermining 2.0cm         moderate serosanguinous drainage, periwound with erythema            there is no odor, no increased warmth, induration, fluctuance, nor crepitus    LABS/ CULTURES/ RADIOLOGY:                        10.7   8.02  )-----------( 359      ( 05 Sep 2024 07:05 )             36.2       138  |  105  |  8   ----------------------------<  93      [09-05-24 @ 07:05]  4.6   |  21  |  0.57        Ca     9.3     [09-05-24 @ 07:05]    TPro  6.8  /  Alb  2.7  /  TBili  0.6  /  DBili  x   /  AST  13  /  ALT  8   /  AlkPhos  75  [09-05-24 @ 07:05]    PT/INR: PT 11.6 , INR 1.11       [09-04-24 @ 12:52]  PTT: 29.3       [09-04-24 @ 12:52]              Culture - Blood (collected 09-04-24 @ 12:08)  Source: .Blood Blood-Peripheral  Preliminary Report (09-05-24 @ 18:01):    No growth at 24 hours    Culture - Blood (collected 09-04-24 @ 12:00)  Source: .Blood Blood-Peripheral  Preliminary Report (09-05-24 @ 18:01):    No growth at 24 hours      < from: CT Abdomen and Pelvis w/ IV Cont (09.04.24 @ 13:37) >    ACC: [id number] EXAM:  CT ABDOMEN AND PELVIS IC   ORDERED BY: ANTONINO CLEMENT     PROCEDURE DATE:  09/04/2024          INTERPRETATION:  CLINICAL INFORMATION: Cellulitis secondary to a sacral   decubitus ulcer.    COMPARISON: CT bony pelvis 12/29/2023.    CONTRAST/COMPLICATIONS:  IV Contrast: Omnipaque 350  90 cc administered   10 cc discarded  Oral Contrast: NONE  Complications: None reported at time of study completion    PROCEDURE:  CT of the Abdomen and Pelvis was performed.  Sagittal and coronal reformats were performed.    FINDINGS:  LOWER CHEST: Basilar subsegmental atelectasis. Coronary artery   calcifications.    LIVER: Subcentimeter hypodensity in the left lobe that is too small to   characterize.  BILE DUCTS: Common bile duct measures up to 9 mm in caliber, which is   borderline dilated for the patient's age. Hyperdensity in the distal   common bile duct, which may reflect choledocholithiasis or sludge (series   301 image 40).  GALLBLADDER: Cholelithiasis.  SPLEEN: Within normal limits.  PANCREAS: Within normal limits.  ADRENALS: Within normal limits.  KIDNEYS/URETERS: No hydronephrosis. Subcentimeter hypodense foci in both   kidneys that are too small to characterize.    BLADDER: Partially obscured by streak artifact from hip arthroplasties.   Trace hyperdensity in the right deep aspect of the the urinary bladder,   which could reflect bladder stone(s).  REPRODUCTIVE ORGANS: Limited evaluation due to streak artifact from hip   arthroplasties. Findings pertaining to theleft adnexa as below.    BOWEL: Periampullary duodenal diverticulum. Large rectal stool burden,   the rectum distended to approximately 9.0 cm. No significant upstream   bowel distention to suggest obstruction. Mild rectal wall thickening and   perirectal fat infiltration. Mild to moderate stool burden elsewhere   within the colon. Colonic diverticulosis. Wall thickening in the sigmoid   colon, similar to 12/29/2023. Again seen is a focus of soft tissue   density inseparable from the sigmoid colon that measures 3.2 x 2.1 cm   (series 301 image 64), previously 3.6 x 2.0 cm on 12/29/2023. It is   unclear if this is related to the sigmoid colon or possibly adnexal,   potentially representing the left ovary.  PERITONEUM/RETROPERITONEUM: Within normal limits.  VESSELS: Atherosclerotic changes.  LYMPH NODES: No lymphadenopathy.  ABDOMINAL WALL: Subcutaneous infiltration overlying the posterior aspect   of the bony pelvis and involving the bilateral gluteal region. Cutaneous   irregularity with a small amount of superficial soft tissue gas in in the   left gluteal region (series 301 image 84). No discrete drainable   collection. No definite erosive changes to the subjacent bone identified.  BONES: Bilateral hip arthroplasties with associatedstreak artifact   obscuring portions of the pelvis. Plate and screw fixation in the   proximal left forearm noted on the  image. Degenerative changes.   Osseous demineralization. Grade 2 anterolisthesis of L5 over S1,   unchanged.    IMPRESSION:  *  Subcutaneous infiltration in the bilateral gluteal region and   overlying the posterior aspect of the bony pelvis, which could reflect   reported cellulitis. Focal area of cutaneous irregularity and small   amount of superficial soft tissue gas in the left gluteal region, which   may represent the known decubitus ulcer. The gas could be due to open   communication to the environment, versus an airforming infection. No   drainable collection.  *  Cholelithiasis. Borderline common bile duct dilation. Hyperdensity in   the distal common bile duct, which may represent choledocholithiasis or   sludge. Consider further evaluation with abdominal MRI/MRCP.  *  Large rectal stool burden. Mild rectal wall thickening and perirectal   fat infiltration, possibly reflecting stercoral proctitis.  *  Colonic diverticulosis. Wall thickening in the sigmoid colon, similar   to the exam of 12/29/2023, potentially due to chronic diverticular   disease or colitis. Focus of soft tissue density inseparable from the   sigmoid colon measuring 3.2 x 2.1 cm, also seen on 12/29/2023; it is   unclear if this is a tract of tissue related to colonic pathology, or a   part of the left adnexa, possibly the left ovary. A neoplastic process is   not entirely excluded.        --- End of Report ---            PILAR BORDEN MD; Attending Radiologist  This document has been electronically signed. Sep  4 2024  2:44PM    < end of copied text >

## 2024-09-05 NOTE — PROGRESS NOTE ADULT - PROBLEM SELECTOR PLAN 6
dvt ppx: Lovenox  diet: soft  dispo: pending cx results, abx duration  GOC: DNR/DNI Molst in chart. HCP Julio Churchill dvt ppx: Lovenox  diet: soft  dispo: pending cx results, abx duration  GOC: DNR/DNI Molst in chart. HCP Julio Churchill  Discussed with ACP Tessa

## 2024-09-05 NOTE — PHARMACOTHERAPY INTERVENTION NOTE - COMMENTS
MARIELENA FAIRCHILD, 89y Female with concern for infected sacral decub ulcer, patient seen on ID consult rounds.    Recommendation(s):  Discussed with team, will c/w current abx regimen of zosyn and vancomycin pending cultures. For the vancomycin dose, I suggested to decrease the dose to 500 mg IV Q24H given the patient's age and weight of 45 kg. Can check a level prior to the 3rd dose on this regimen if continued.    With kind regards,  Bucky Schroeder, PharmD, BCIDP  Infectious Diseases Clinical Pharmacist  Available on [employer] Teams  .

## 2024-09-05 NOTE — CONSULT NOTE ADULT - ASSESSMENT
89F with b/l decubitus ulcer/cellulitis  - currently on vanc/zosyn  - wound and blood culture pending    INCOMPLETE 89F with hx of HTN and dementia presenting with AMS and b/l decubitus ulcers. Limited history of wound development, but stage 1 4cm x2cm sacral ulcer was documented during last admission in 12/2023. Per family, patient has been bedbound for more than 3 months after rehab discharge without proper wound care resources. The open wounds were acutely worsened for 3 wks, but pt without fever or other signs of infection. Exam significant for left gluteal - upper medial side with superficial pressure wound 3-5cm, no active drainage and lower medial side with moderate ~[id number] open pressure wound with some yellow drainage, no bone exposure. Right gluteal: superficial pressure wound. Labs WBC wnl, elevated CRP 36 and ESR 73, lactate 3.2. CT showing subcutaneous infiltration in the bilateral gluteal region and overlying the posterior aspect of the bony pelvis, which could reflect reported cellulitis. No drainable collection. Patient received Vanc/Zosyn in ED and pending culture results.     #Cellulitis/decubitus ulcers  - Unclear duration or hx of treatment of the wounds; need wound care evaluation for thorough management.  - No bone involvement per CT.   - Follow wound and blood culture results for abx adjustment.  - Abx: Vancomycin 500mg daily (dose adjustment needed from current regimen) and zosyn 3.375 q8 per ID pharm rec.    Case discussed with the attending physician and updated the primary team.

## 2024-09-05 NOTE — CONSULT NOTE ADULT - SUBJECTIVE AND OBJECTIVE BOX
Patient is a 89y old  Female who presents with a chief complaint of ams for 1 day (04 Sep 2024 17:49)    HPI:   89yof pmhx of HTN Dementia (baseline AOx1-2) bed bound, hypothyroidism, brought in by EMS for AMS. Neice at bedside provided colleratal. Patient was getting treated for her sacral wound with PO amoxicillin since 8/29. She was her usual self yesterday prior to bed. This morning, the aides noted she was slow to respond and not talking to family. Daughter noticed slurring of her speech since this morning but has not notice any weakness on either side of her body. Aides/daughter called PCP who advised to take he to the ER for IV abx. Daughter confirms DNR/DNI with minimal invasive interventions but agreeable for IV abx. Patient is bedbound at baseline and has 24hour aides. She does have ep of agitation at home.     In the ED, vitals stable. Given zosyn.  (04 Sep 2024 17:49)       prior hospital charts reviewed [  ]  primary team notes reviewed [  ]  other consultant notes reviewed [  ]    PAST MEDICAL & SURGICAL HISTORY:  Hypothyroid  Arthritis      Stomach ulcer      Hernia, hiatal      Spondylitis      Former smoker, stopped smoking many years ago      Hypothyroidism, unspecified type      Dementia, senile      History of bilateral hip replacements      Status post left knee replacement      No significant past surgical history          Allergies  No Known Allergies    ANTIMICROBIALS (past 90 days)  MEDICATIONS  (STANDING):  piperacillin/tazobactam IVPB..   25 mL/Hr IV Intermittent (09-05-24 @ 01:39)    piperacillin/tazobactam IVPB...   200 mL/Hr IV Intermittent (09-04-24 @ 12:48)    vancomycin  IVPB   250 mL/Hr IV Intermittent (09-05-24 @ 06:12)   250 mL/Hr IV Intermittent (09-04-24 @ 18:11)        piperacillin/tazobactam IVPB.. 3.375 every 8 hours  vancomycin  IVPB 750 every 12 hours    MEDICATIONS  (STANDING):  acetaminophen     Tablet .. 650 every 6 hours PRN  aluminum hydroxide/magnesium hydroxide/simethicone Suspension 30 every 4 hours PRN  bisacodyl Suppository 10 once  enoxaparin Injectable 40 every 24 hours  levothyroxine 75 daily  melatonin 3 at bedtime PRN  ondansetron Injectable 4 every 8 hours PRN  polyethylene glycol 3350 17 two times a day  risperiDONE   Tablet 0.125 daily    SOCIAL HISTORY:       FAMILY HISTORY:  No pertinent family history in first degree relatives      REVIEW OF SYSTEMS  [  ] ROS unobtainable because:    [  ] All other systems negative except as noted below:	    Constitutional:  [ ] fever [ ] chills  [ ] weight loss  [ ] weakness  Skin:  [ ] rash [ ] phlebitis	  Eyes: [ ] icterus [ ] pain  [ ] discharge	  ENMT: [ ] sore throat  [ ] thrush [ ] ulcers [ ] exudates  Respiratory: [ ] dyspnea [ ] hemoptysis [ ] cough [ ] sputum	  Cardiovascular:  [ ] chest pain [ ] palpitations [ ] edema	  Gastrointestinal:  [ ] nausea [ ] vomiting [ ] diarrhea [ ] constipation [ ] pain	  Genitourinary:  [ ] dysuria [ ] frequency [ ] hematuria [ ] discharge [ ] flank pain  [ ] incontinence  Musculoskeletal:  [ ] myalgias [ ] arthralgias [ ] arthritis  [ ] back pain  Neurological:  [ ] headache [ ] seizures  [ ] confusion/altered mental status  Psychiatric:  [ ] anxiety [ ] depression	  Hematology/Lymphatics:  [ ] lymphadenopathy  Endocrine:  [ ] adrenal [ ] thyroid  Allergic/Immunologic:	 [ ] transplant [ ] seasonal    Vital Signs Last 24 Hrs  T(F): 98.3 (09-05-24 @ 09:32), Max: 99.8 (09-04-24 @ 12:29)  Vital Signs Last 24 Hrs  HR: 90 (09-05-24 @ 09:32) (67 - 100)  BP: 107/71 (09-05-24 @ 09:32) (92/62 - 108/74)  RR: 18 (09-05-24 @ 09:32)  SpO2: 98% (09-05-24 @ 09:32) (95% - 98%)  Wt(kg): --    PHYSICAL EXAM:  Constitutional: non-toxic, no distress  HEAD/EYES: anicteric, no conjunctival injection  ENT:  supple, no thrush  Cardiovascular:   normal S1, S2, no murmur, no edema  Respiratory:  clear BS bilaterally, no wheezes, no rales  GI:  soft, non-tender, normal bowel sounds  :  no castle, no CVA tenderness  Musculoskeletal:  no synovitis, normal ROM  Neurologic: awake and alert, normal strength, no focal findings  Skin:  no rash, no erythema, no phlebitis  Heme/Onc: no lymphadenopathy   Psychiatric:  awake, alert, appropriate mood                            10.7   8.02  )-----------( 359      ( 05 Sep 2024 07:05 )             36.2   09-05    138  |  105  |  8   ----------------------------<  93  4.6   |  21<L>  |  0.57    Ca    9.3      05 Sep 2024 07:05    TPro  6.8  /  Alb  2.7<L>  /  TBili  0.6  /  DBili  x   /  AST  13  /  ALT  8<L>  /  AlkPhos  75  09-05      MICROBIOLOGY:              RADIOLOGY:  imaging below personally reviewed and agree with findings     Patient is a 89y old  Female who presents with a chief complaint of ams for 1 day (04 Sep 2024 17:49)    HPI:   89yof pmhx of HTN Dementia (baseline AOx1-2) bed bound, hypothyroidism, brought in by EMS for AMS. Neice at bedside provided colleratal. Patient was getting treated for her sacral wound with PO amoxicillin since 8/29. She was her usual self yesterday prior to bed. Yesterday morning, the aides noted she was slow to respond and not talking to family. Daughter noticed slurring of her speech since this morning but has not notice any weakness on either side of her body. Aides/daughter called PCP who advised to take he to the ER for IV abx. Daughter confirms DNR/DNI with minimal invasive interventions but agreeable for IV abx. Patient is bedbound at baseline and has 24hour aides. She does have ep of agitation at home.     In the ED, vitals stable. Given zosyn.         PAST MEDICAL & SURGICAL HISTORY:  Hypothyroid  Arthritis  Stomach ulcer      Hernia, hiatal      Spondylitis      Former smoker, stopped smoking many years ago      Hypothyroidism, unspecified type      Dementia, senile      History of bilateral hip replacements      Status post left knee replacement      No significant past surgical history          Allergies  No Known Allergies    ANTIMICROBIALS (past 90 days)  MEDICATIONS  (STANDING):  piperacillin/tazobactam IVPB..   25 mL/Hr IV Intermittent (09-05-24 @ 01:39)    piperacillin/tazobactam IVPB...   200 mL/Hr IV Intermittent (09-04-24 @ 12:48)    vancomycin  IVPB   250 mL/Hr IV Intermittent (09-05-24 @ 06:12)   250 mL/Hr IV Intermittent (09-04-24 @ 18:11)        piperacillin/tazobactam IVPB.. 3.375 every 8 hours  vancomycin  IVPB 750 every 12 hours    MEDICATIONS  (STANDING):  acetaminophen     Tablet .. 650 every 6 hours PRN  aluminum hydroxide/magnesium hydroxide/simethicone Suspension 30 every 4 hours PRN  bisacodyl Suppository 10 once  enoxaparin Injectable 40 every 24 hours  levothyroxine 75 daily  melatonin 3 at bedtime PRN  ondansetron Injectable 4 every 8 hours PRN  polyethylene glycol 3350 17 two times a day  risperiDONE   Tablet 0.125 daily    SOCIAL HISTORY:       FAMILY HISTORY:  No pertinent family history in first degree relatives      REVIEW OF SYSTEMS  [  ] ROS unobtainable because:    [  ] All other systems negative except as noted below:	    Constitutional:  [ ] fever [ ] chills  [ ] weight loss  [ ] weakness  Skin:  [ ] rash [ ] phlebitis	  Eyes: [ ] icterus [ ] pain  [ ] discharge	  ENMT: [ ] sore throat  [ ] thrush [ ] ulcers [ ] exudates  Respiratory: [ ] dyspnea [ ] hemoptysis [ ] cough [ ] sputum	  Cardiovascular:  [ ] chest pain [ ] palpitations [ ] edema	  Gastrointestinal:  [ ] nausea [ ] vomiting [ ] diarrhea [ ] constipation [ ] pain	  Genitourinary:  [ ] dysuria [ ] frequency [ ] hematuria [ ] discharge [ ] flank pain  [ ] incontinence  Musculoskeletal:  [ ] myalgias [ ] arthralgias [ ] arthritis  [ ] back pain  Neurological:  [ ] headache [ ] seizures  [ ] confusion/altered mental status  Psychiatric:  [ ] anxiety [ ] depression	  Hematology/Lymphatics:  [ ] lymphadenopathy  Endocrine:  [ ] adrenal [ ] thyroid  Allergic/Immunologic:	 [ ] transplant [ ] seasonal    Vital Signs Last 24 Hrs  T(F): 98.3 (09-05-24 @ 09:32), Max: 99.8 (09-04-24 @ 12:29)  Vital Signs Last 24 Hrs  HR: 90 (09-05-24 @ 09:32) (67 - 100)  BP: 107/71 (09-05-24 @ 09:32) (92/62 - 108/74)  RR: 18 (09-05-24 @ 09:32)  SpO2: 98% (09-05-24 @ 09:32) (95% - 98%)  Wt(kg): --    PHYSICAL EXAM:  Constitutional: non-toxic, no distress  HEAD/EYES: anicteric, no conjunctival injection  ENT:  supple, no thrush  Cardiovascular:   normal S1, S2, no murmur, no edema  Respiratory:  clear BS bilaterally, no wheezes, no rales  GI:  soft, non-tender, normal bowel sounds  :  no castle, no CVA tenderness  Musculoskeletal:  no synovitis, normal ROM  Neurologic: awake and alert, normal strength, no focal findings  Skin:  no rash, no erythema, no phlebitis  Heme/Onc: no lymphadenopathy   Psychiatric:  awake, alert, appropriate mood                            10.7   8.02  )-----------( 359      ( 05 Sep 2024 07:05 )             36.2   09-05    138  |  105  |  8   ----------------------------<  93  4.6   |  21<L>  |  0.57    Ca    9.3      05 Sep 2024 07:05    TPro  6.8  /  Alb  2.7<L>  /  TBili  0.6  /  DBili  x   /  AST  13  /  ALT  8<L>  /  AlkPhos  75  09-05      MICROBIOLOGY:              RADIOLOGY:  imaging below personally reviewed and agree with findings    CT Head No Cont (09.04.24 @ 19:45)  IMPRESSION:  Mild chronic microvascular changes without evidence of an   acute transcortical infarction or hemorrhage.      CT Abdomen and Pelvis w/ IV Cont (09.04.24 @ 13:37)  IMPRESSION:  *  Subcutaneous infiltration in the bilateral gluteal region and   overlying the posterior aspect of the bony pelvis, which could reflect   reported cellulitis. Focal area of cutaneous irregularity and small   amount of superficial soft tissue gas in the left gluteal region, which   may represent the known decubitus ulcer. The gas could be due to open   communication to the environment, versus an airforming infection. No   drainable collection.  *  Cholelithiasis. Borderline common bile duct dilation. Hyperdensity in   the distal common bile duct, which may represent choledocholithiasis or   sludge. Consider further evaluation with abdominal MRI/MRCP.  *  Large rectal stool burden. Mild rectal wall thickening and perirectal   fat infiltration, possibly reflecting stercoral proctitis.  *  Colonic diverticulosis. Wall thickening in the sigmoid colon, similar   to the exam of 12/29/2023, potentially due to chronic diverticular   disease or colitis. Focus of soft tissue density inseparable from the   sigmoid colon measuring 3.2 x 2.1 cm, also seen on 12/29/2023; it is   unclear if this is a tract of tissue related to colonic pathology, or a   part of the left adnexa, possibly the left ovary. A neoplastic process is   not entirely excluded.   Patient is a 89y old  Female who presents with a chief complaint of ams for 1 day (04 Sep 2024 17:49)    HPI:  89F with HTN, Dementia (baseline AOx1-2) bed bound, hypothyroidism, brought in by EMS for AMS. Neice at bedside provided colleratal. Patient was getting treated for her sacral wound with PO amoxicillin since 8/29. She was her usual self yesterday prior to bed. Yesterday morning, the aides noted she was slow to respond and not talking to family. Daughter noticed slurring of her speech since this morning but has not notice any weakness on either side of her body. Aides/daughter called PCP who advised to take he to the ER for IV abx. Daughter confirms DNR/DNI with minimal invasive interventions but agreeable for IV abx. Patient is bedbound at baseline and has 24hour aides. She does have ep of agitation at home.     In the ED, vitals stable. Given zosyn.       PAST MEDICAL & SURGICAL HISTORY:  Hypothyroid  Arthritis  Stomach ulcer  Hernia, hiatal  Spondylitis  Former smoker, stopped smoking many years ago  Hypothyroidism, unspecified type  Dementia, senile  History of bilateral hip replacements  Status post left knee replacement    Allergies  No Known Allergies    ANTIMICROBIALS:  piperacillin/tazobactam IVPB.. 3.375 every 8 hours (9/4-)  vancomycin  IVPB 750 every 12 hours (9/4-)    MEDICATIONS  (STANDING):  bisacodyl Suppository 10 once  enoxaparin Injectable 40 every 24 hours  levothyroxine 75 daily  polyethylene glycol 3350 17 two times a day  risperiDONE   Tablet 0.125 daily    SOCIAL HISTORY:   former smoker    FAMILY HISTORY:    REVIEW OF SYSTEMS  [  ] ROS unobtainable because:    [  ] All other systems negative except as noted below:	    Constitutional:  [ ] fever [ ] chills  [ ] weight loss  [ ] weakness  Skin:  [ ] rash [ ] phlebitis	  Eyes: [ ] icterus [ ] pain  [ ] discharge	  ENMT: [ ] sore throat  [ ] thrush [ ] ulcers [ ] exudates  Respiratory: [ ] dyspnea [ ] hemoptysis [ ] cough [ ] sputum	  Cardiovascular:  [ ] chest pain [ ] palpitations [ ] edema	  Gastrointestinal:  [ ] nausea [ ] vomiting [ ] diarrhea [ ] constipation [ ] pain	  Genitourinary:  [ ] dysuria [ ] frequency [ ] hematuria [ ] discharge [ ] flank pain  [ ] incontinence  Musculoskeletal:  [ ] myalgias [ ] arthralgias [ ] arthritis  [ ] back pain  Neurological:  [ ] headache [ ] seizures  [ ] confusion/altered mental status  Psychiatric:  [ ] anxiety [ ] depression	  Hematology/Lymphatics:  [ ] lymphadenopathy  Endocrine:  [ ] adrenal [ ] thyroid  Allergic/Immunologic:	 [ ] transplant [ ] seasonal    Vital Signs Last 24 Hrs  T(F): 98.3 (09-05-24 @ 09:32), Max: 99.8 (09-04-24 @ 12:29)  Vital Signs Last 24 Hrs  HR: 90 (09-05-24 @ 09:32) (67 - 100)  BP: 107/71 (09-05-24 @ 09:32) (92/62 - 108/74)  RR: 18 (09-05-24 @ 09:32)  SpO2: 98% (09-05-24 @ 09:32) (95% - 98%)  Wt(kg): --    PHYSICAL EXAM:                           10.7   8.02  )-----------( 359      ( 05 Sep 2024 07:05 )             36.2     138  |  105  |  8   ----------------------------<  93  4.6   |  21<L>  |  0.57    Ca    9.3      05 Sep 2024 07:05    TPro  6.8  /  Alb  2.7<L>  /  TBili  0.6  /  DBili  x   /  AST  13  /  ALT  8<L>  /  AlkPhos  75  09-05    Sedimentation Rate, Erythrocyte: 73 (09-04 @ 12:52)  C-Reactive Protein: 36 (09-04 @ 12:52)    MICROBIOLOGY:    RADIOLOGY:  imaging below personally reviewed and agree with findings    CT Head No Cont (09.04.24 @ 19:45)  IMPRESSION:  Mild chronic microvascular changes without evidence of an   acute transcortical infarction or hemorrhage.      CT Abdomen and Pelvis w/ IV Cont (09.04.24 @ 13:37)  IMPRESSION:  *  Subcutaneous infiltration in the bilateral gluteal region and overlying the posterior aspect of the bony pelvis, which could reflect reported cellulitis. Focal area of cutaneous irregularity and small   amount of superficial soft tissue gas in the left gluteal region, which may represent the known decubitus ulcer. The gas could be due to open communication to the environment, versus an airforming infection. No   drainable collection.  *  Cholelithiasis. Borderline common bile duct dilation. Hyperdensity in the distal common bile duct, which may represent choledocholithiasis or sludge. Consider further evaluation with abdominal MRI/MRCP.  *  Large rectal stool burden. Mild rectal wall thickening and perirectal fat infiltration, possibly reflecting stercoral proctitis.  *  Colonic diverticulosis. Wall thickening in the sigmoid colon, similar to the exam of 12/29/2023, potentially due to chronic diverticular disease or colitis. Focus of soft tissue density inseparable from the sigmoid colon measuring 3.2 x 2.1 cm, also seen on 12/29/2023; it is unclear if this is a tract of tissue related to colonic pathology, or a part of the left adnexa, possibly the left ovary. A neoplastic process is not entirely excluded.   Patient is a 89y old  Female who presents with a chief complaint of ams for 1 day (04 Sep 2024 17:49)    HPI:  89F with HTN, Dementia (baseline AOx1-2) bed bound, hypothyroidism, brought in by EMS for AMS. Niece at bedside provided colleratal. Patient was getting treated for her sacral wound with PO amoxicillin since 8/29. She was her usual self yesterday prior to bed. Yesterday morning, the aides noted she was slow to respond and not talking to family. Daughter noticed slurring of her speech since the morning but has not notice any weakness on either side of her body. Aides/daughter called PCP who advised to take he to the ER for IV abx. Daughter confirms DNR/DNI with minimal invasive interventions but agreeable for IV abx. Patient is bedbound at baseline and has 24hour aides. She does have ep of agitation at home.     Per niece, who is the health care proxy, notes that after the last admission in 1/2024, pt was discharged to rehab where she stayed for 3 months. Since then, pt has been gradually deconditioning (loss of appetite, bedbound), but more acutely worsened with development of open wound on bilateral buttocks for the past 3 weeks. While at home, family was having difficulty setting up a visiting nurse care but was able to get home call care occasionally.      In the ED, vitals stable. Given vanc and zosyn.       PAST MEDICAL & SURGICAL HISTORY:  Hypothyroid  Arthritis  Stomach ulcer  Hernia, hiatal  Spondylitis  Former smoker, stopped smoking many years ago  Hypothyroidism, unspecified type  Dementia, senile  History of bilateral hip replacements  Status post left knee replacement    Allergies  No Known Allergies    ANTIMICROBIALS:  piperacillin/tazobactam IVPB.. 3.375 every 8 hours (9/4-)  vancomycin  IVPB 750 every 12 hours (9/4-)    MEDICATIONS  (STANDING):  bisacodyl Suppository 10 once  enoxaparin Injectable 40 every 24 hours  levothyroxine 75 daily  polyethylene glycol 3350 17 two times a day  risperiDONE   Tablet 0.125 daily    SOCIAL HISTORY:   former smoker    FAMILY HISTORY:    REVIEW OF SYSTEMS  [ x ] Full ROS unobtainable because:  pt has hx of dementia; complains of unspecified pain.       Vital Signs Last 24 Hrs  T(F): 98.3 (09-05-24 @ 09:32), Max: 99.8 (09-04-24 @ 12:29)  Vital Signs Last 24 Hrs  HR: 90 (09-05-24 @ 09:32) (67 - 100)  BP: 107/71 (09-05-24 @ 09:32) (92/62 - 108/74)  RR: 18 (09-05-24 @ 09:32)  SpO2: 98% (09-05-24 @ 09:32) (95% - 98%)  Wt(kg): --    PHYSICAL EXAM:    Constitutional: non-toxic, no distress, lying comfortably in bed  HEAD/EYES: anicteric, no conjunctival injection  ENT:  supple, no thrush  Cardiovascular:   normal S1, S2, no murmur, no edema  Respiratory:  clear BS bilaterally, no wheezes, no rales  GI:  soft, tender to palpation on right and left upper quadrants, mildly distended abdomen, normal bowel sounds  :  no castle, +external catheter, no CVA tenderness  Musculoskeletal:  no synovitis, normal ROM, fingers cold to touch  Neurologic: awaek, AOx1-2, normal strength, no focal findings  Skin:  left gluteal: upper medial side with superficial pressure wound 3-5cm, no active drainage. lower medial side with moderate ~[id number] open pressure wound with some yellow drainage, no bone exposure. Right gluteal: superficial pressure wound.   Heme/Onc: no lymphadenopathy   Psychiatric:  awake, alert, appropriate mood                           10.7   8.02  )-----------( 359      ( 05 Sep 2024 07:05 )             36.2     138  |  105  |  8   ----------------------------<  93  4.6   |  21<L>  |  0.57    Ca    9.3      05 Sep 2024 07:05    TPro  6.8  /  Alb  2.7<L>  /  TBili  0.6  /  DBili  x   /  AST  13  /  ALT  8<L>  /  AlkPhos  75  09-05    Sedimentation Rate, Erythrocyte: 73 (09-04 @ 12:52)  C-Reactive Protein: 36 (09-04 @ 12:52)    MICROBIOLOGY:    RADIOLOGY:  imaging below personally reviewed and agree with findings    CT Head No Cont (09.04.24 @ 19:45)  IMPRESSION:  Mild chronic microvascular changes without evidence of an acute transcortical infarction or hemorrhage.      CT Abdomen and Pelvis w/ IV Cont (09.04.24 @ 13:37)  IMPRESSION:  *  Subcutaneous infiltration in the bilateral gluteal region and overlying the posterior aspect of the bony pelvis, which could reflect reported cellulitis. Focal area of cutaneous irregularity and small   amount of superficial soft tissue gas in the left gluteal region, which may represent the known decubitus ulcer. The gas could be due to open communication to the environment, versus an airforming infection. No drainable collection.  *  Cholelithiasis. Borderline common bile duct dilation. Hyperdensity in the distal common bile duct, which may represent choledocholithiasis or sludge. Consider further evaluation with abdominal MRI/MRCP.  *  Large rectal stool burden. Mild rectal wall thickening and perirectal fat infiltration, possibly reflecting stercoral proctitis.  *  Colonic diverticulosis. Wall thickening in the sigmoid colon, similar to the exam of 12/29/2023, potentially due to chronic diverticular disease or colitis. Focus of soft tissue density inseparable from the sigmoid colon measuring 3.2 x 2.1 cm, also seen on 12/29/2023; it is unclear if this is a tract of tissue related to colonic pathology, or a part of the left adnexa, possibly the left ovary. A neoplastic process is not entirely excluded.   Patient is a 89y old  Female who presents with a chief complaint of ams for 1 day (04 Sep 2024 17:49)    HPI:  89F with HTN, Dementia (baseline AOx1-2) bed bound, hypothyroidism, brought in by EMS for AMS. Niece at bedside provided colleratal. Patient was getting treated for her sacral wound with PO amoxicillin since 8/29. She was her usual self yesterday prior to bed. Yesterday morning, the aides noted she was slow to respond and not talking to family. Daughter noticed slurring of her speech since the morning but has not notice any weakness on either side of her body. Aides/daughter called PCP who advised to take he to the ER for IV abx. Daughter confirms DNR/DNI with minimal invasive interventions but agreeable for IV abx. Patient is bedbound at baseline and has 24hour aides. She does have ep of agitation at home.     Per niece, who is the health care proxy, notes that after the last admission in 1/2024, pt was discharged to rehab where she stayed for 3 months. Since then, pt has been gradually deconditioning (loss of appetite, bedbound), but more acutely worsened with development of open wound on bilateral buttocks for the past 3 weeks. While at home, family was having difficulty setting up a visiting nurse care but was able to get home call care occasionally.      In the ED, vitals stable. Given vanc and zosyn.       PAST MEDICAL & SURGICAL HISTORY:  Hypothyroid  Arthritis  Stomach ulcer  Hernia, hiatal  Spondylitis  Former smoker, stopped smoking many years ago  Hypothyroidism, unspecified type  Dementia, senile  History of bilateral hip replacements  Status post left knee replacement    Allergies  No Known Allergies    ANTIMICROBIALS:  piperacillin/tazobactam IVPB.. 3.375 every 8 hours (9/4-)  vancomycin  IVPB 750 every 12 hours (9/4-)    MEDICATIONS  (STANDING):  bisacodyl Suppository 10 once  enoxaparin Injectable 40 every 24 hours  levothyroxine 75 daily  polyethylene glycol 3350 17 two times a day  risperiDONE   Tablet 0.125 daily    SOCIAL HISTORY:   former smoker    FAMILY HISTORY:    REVIEW OF SYSTEMS  [ x ] Full ROS unobtainable because:  pt has hx of dementia; complains of unspecified pain.       Vital Signs Last 24 Hrs  T(F): 98.3 (09-05-24 @ 09:32), Max: 99.8 (09-04-24 @ 12:29)  Vital Signs Last 24 Hrs  HR: 90 (09-05-24 @ 09:32) (67 - 100)  BP: 107/71 (09-05-24 @ 09:32) (92/62 - 108/74)  RR: 18 (09-05-24 @ 09:32)  SpO2: 98% (09-05-24 @ 09:32) (95% - 98%)  Wt(kg): --    PHYSICAL EXAM:  Constitutional: non-toxic, no distress, lying comfortably in bed  HEAD/EYES: anicteric, no conjunctival injection  ENT:  supple, no thrush  Cardiovascular:   normal S1, S2, no murmur, no edema  Respiratory:  clear BS bilaterally, no wheezes, no rales  GI:  soft, tender to palpation on right and left upper quadrants, mildly distended abdomen, normal bowel sounds  :  no castle, +external catheter, no CVA tenderness  Musculoskeletal:  no synovitis, normal ROM, fingers cold to touch  Neurologic: awaek, AOx1-2, normal strength, no focal findings  Skin:  left gluteal: upper medial side with superficial pressure wound 3-5cm, no active drainage. lower medial side with moderate ~[id number] open pressure wound with some yellow drainage, no bone exposure. Right gluteal: superficial pressure wound.   Psychiatric:  awake, alert, appropriate mood                       10.7   8.02  )-----------( 359      ( 05 Sep 2024 07:05 )             36.2     138  |  105  |  8   ----------------------------<  93  4.6   |  21<L>  |  0.57    Ca    9.3      05 Sep 2024 07:05    TPro  6.8  /  Alb  2.7<L>  /  TBili  0.6  /  DBili  x   /  AST  13  /  ALT  8<L>  /  AlkPhos  75  09-05    Sedimentation Rate, Erythrocyte: 73 (09-04 @ 12:52)  C-Reactive Protein: 36 (09-04 @ 12:52)    MICROBIOLOGY:  9/5 wound culture pending  9/4 BC pending    RADIOLOGY:  imaging below personally reviewed and agree with findings    CT Head No Cont (09.04.24 @ 19:45)  IMPRESSION:  Mild chronic microvascular changes without evidence of an acute transcortical infarction or hemorrhage.    CT Abdomen and Pelvis w/ IV Cont (09.04.24 @ 13:37)  IMPRESSION:  *  Subcutaneous infiltration in the bilateral gluteal region and overlying the posterior aspect of the bony pelvis, which could reflect reported cellulitis. Focal area of cutaneous irregularity and small   amount of superficial soft tissue gas in the left gluteal region, which may represent the known decubitus ulcer. The gas could be due to open communication to the environment, versus an airforming infection. No drainable collection.  *  Cholelithiasis. Borderline common bile duct dilation. Hyperdensity in the distal common bile duct, which may represent choledocholithiasis or sludge. Consider further evaluation with abdominal MRI/MRCP.  *  Large rectal stool burden. Mild rectal wall thickening and perirectal fat infiltration, possibly reflecting stercoral proctitis.  *  Colonic diverticulosis. Wall thickening in the sigmoid colon, similar to the exam of 12/29/2023, potentially due to chronic diverticular disease or colitis. Focus of soft tissue density inseparable from the sigmoid colon measuring 3.2 x 2.1 cm, also seen on 12/29/2023; it is unclear if this is a tract of tissue related to colonic pathology, or a part of the left adnexa, possibly the left ovary. A neoplastic process is not entirely excluded.

## 2024-09-05 NOTE — CONSULT NOTE ADULT - ASSESSMENT
A/P: 89yof pmhx of HTN Dementia (baseline AOx1-2) bed bound, hypothyroidism, brought in by EMS for AMS. Nemakayla at bedside provided colleratal. Patient was getting treated for her sacral wound with PO amoxicillin since 8/29. She was her usual self yesterday prior to bed. This morning, the aides noted she was slow to respond and not talking to family. Daughter noticed slurring of her speech since this morning but has not notice any weakness on either side of her body. Aides/daughter called PCP who advised to take he to the ER for IV abx. Daughter confirms DNR/DNI with minimal invasive interventions but agreeable for IV abx. Patient is bedbound at baseline and has 24hour aides. She does have ep of agitation at home.         Wound Consult requested to assist w/ management of  BL buttocks unstageable and Deep tissue pressure injuries  Lt heel DTPI    Recommendations  BL buttock injuries: Medihoney aquacel  Lt heel Betadine daily      off load with heel boots    Abx per Medicine/ ID  Moisturize intact skin w/ SWEEN cream BID  Nutrition Consult for optimization in pt w/ Increased nutritional needs            encourage high quality protein, alanna/ prosource, MVI & Vit C to promote wound healing    Continue turning and positioning w/ offloading assistive devices as per protocol  Buttocks/ Sacrum Hua/ BID and prn soiling        Continue w/ attends under pads and Pericare as per protocol  Waffle Cushion to chair when oob to chair  Continue w/ low air loss pressure redistribution bed surface   Pt will need Group 2 mattress on hospital bed and ROHO cushion for wheel chair upon discharge home  Care as per medicine, will follow w/ you  Upon discharge f/u as outpatient at [facility] [address] [facility] [phone]  Seen  and D/w team & RN  Thank you for this consult,  Sayra Davila, LYN-BC, [facility]  [phone]  Nights/ Weekends/ Holidays please call:  General Surgery Consult pager ([phone]) for emergencies  Wound PT for multilayer leg wrapping or VAC issues (x [phone])

## 2024-09-06 ENCOUNTER — NON-APPOINTMENT (OUTPATIENT)
Age: 89
End: 2024-09-06

## 2024-09-06 PROCEDURE — 99232 SBSQ HOSP IP/OBS MODERATE 35: CPT

## 2024-09-06 RX ORDER — ASCORBIC ACID/ASCORBATE SODIUM 500 MG
500 TABLET,CHEWABLE ORAL DAILY
Refills: 0 | Status: DISCONTINUED | OUTPATIENT
Start: 2024-09-06 | End: 2024-09-11

## 2024-09-06 RX ORDER — MUPIROCIN 2 %
1 OINTMENT (GRAM) TOPICAL
Refills: 0 | Status: COMPLETED | OUTPATIENT
Start: 2024-09-06 | End: 2024-09-11

## 2024-09-06 RX ADMIN — PIPERACILLIN SODIUM AND TAZOBACTAM SODIUM 25 GRAM(S): 3; .375 INJECTION, POWDER, FOR SOLUTION INTRAVENOUS at 11:17

## 2024-09-06 RX ADMIN — RISPERIDONE 0.12 MILLIGRAM(S): 0.25 TABLET, FILM COATED ORAL at 11:18

## 2024-09-06 RX ADMIN — POLYETHYLENE GLYCOL 3350 17 GRAM(S): 17 POWDER, FOR SOLUTION ORAL at 18:09

## 2024-09-06 RX ADMIN — Medication 100 MILLIGRAM(S): at 06:51

## 2024-09-06 RX ADMIN — ENOXAPARIN SODIUM 40 MILLIGRAM(S): 100 INJECTION SUBCUTANEOUS at 18:08

## 2024-09-06 RX ADMIN — Medication 2 TABLET(S): at 21:55

## 2024-09-06 RX ADMIN — POLYETHYLENE GLYCOL 3350 17 GRAM(S): 17 POWDER, FOR SOLUTION ORAL at 05:19

## 2024-09-06 RX ADMIN — Medication 1 APPLICATION(S): at 18:08

## 2024-09-06 RX ADMIN — PIPERACILLIN SODIUM AND TAZOBACTAM SODIUM 25 GRAM(S): 3; .375 INJECTION, POWDER, FOR SOLUTION INTRAVENOUS at 18:08

## 2024-09-06 RX ADMIN — Medication 75 MICROGRAM(S): at 05:20

## 2024-09-06 RX ADMIN — PIPERACILLIN SODIUM AND TAZOBACTAM SODIUM 25 GRAM(S): 3; .375 INJECTION, POWDER, FOR SOLUTION INTRAVENOUS at 01:43

## 2024-09-06 RX ADMIN — CHLORHEXIDINE GLUCONATE 1 APPLICATION(S): 40 SOLUTION TOPICAL at 12:08

## 2024-09-06 RX ADMIN — Medication 5 MILLIGRAM(S): at 18:08

## 2024-09-06 NOTE — PROGRESS NOTE ADULT - PROBLEM SELECTOR PLAN 6
dvt ppx: Lovenox  diet: soft +ensures  dispo: pending wound cx result, abx duration  GOC: DNR/DNI Molst in chart. HCP Julio Churchill  Discussed with JOSE Browning

## 2024-09-06 NOTE — DIETITIAN INITIAL EVALUATION ADULT - NSFNSNUTRHOMESUPPLEMENTFT_GEN_A_CORE
Pt unable to provide supplement history prior to admission. Pt noted to be taking Cholecalciferol (per H&P).

## 2024-09-06 NOTE — DIETITIAN INITIAL EVALUATION ADULT - REASON
RD unable to perform nutrition focused physical exam at this time, pt confused and unable to provide consent. RD to perform nutrition focused physical exam at a later date if medically appropriate/feasible.

## 2024-09-06 NOTE — DIETITIAN INITIAL EVALUATION ADULT - ORAL INTAKE PTA/DIET HISTORY
Nutrition assessment completed at bedside. Limited subjective information obtained, as pt noted to have dementia, baseline AAOx1-2 (per chart). Pt reported appetite being "so-so." Pt stated she is unsure of her UBW and any weight changes prior to admission. Pt reported no known food allergies, none noted in H&P (per chart).

## 2024-09-06 NOTE — DIETITIAN INITIAL EVALUATION ADULT - ADD RECOMMEND
1) Recommend continue diet free of therapeutic restrictions to optimize protein-energy intake. Defer diet texture/consistency to Medical Team.   --> Discontinue Ensure Muscle Health Cans 3x/day, not provided here in-house, pt declined Ensure supplements as well.   2) Recommend adding Multivitamin and Vitamin C daily unless contraindicated for wound healing.   3) RD to add Magic Cup 2x/day and Smoothie of the Day 2x/day in-house to optimize protein-energy intake and assist with wound healing.   4) Monitor and encourage adequate PO intake, provide feeding assistance as needed to optimize protein-energy intake.   5) Monitor electrolytes, replete as needed. Monitor potassium, magnesium and phosphorous.   6) Monitor nutritional intake, tolerance to diet prescription, bowel movements, weights, labs, and skin integrity.

## 2024-09-06 NOTE — DIETITIAN INITIAL EVALUATION ADULT - OTHER CALCULATIONS
Estimated protein-energy needs calculated using IBW of 115 pounds with consideration for weight discrepancies and deep tissue pressure injuries.   Defer fluid calculations to the medical team.

## 2024-09-06 NOTE — PROGRESS NOTE ADULT - ASSESSMENT
89F with HTN, Dementia (baseline AOx1-2), hypothyroidism who was hospitalized 12/2023 after a fall.  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  Was in rehab for 3 months and discharged April with house calls program.  Over the past month, she had decreased PO intake and developed b/l decubitus ulcers buttocks.  Constripation.  Has 24 hour care.  Niece noted patient to be unresponsive 9/4 and patient was brought to ED by EMS.  No fever.  No leukocytosis.  Normal differential.  Decubitus noted with erythema on the right buttock.  Started on vancomycin / zosyn.  ESR 70s.  CRP 30s.  CT without findings of bony erosion.    Buttock decubitus  - superficially infected  - no evidence of sepsis  - vancomycin to 500mg IV daily  - if wound cx (-) mrsa, will stop vancomycin  - can continue zosyn  - short course  - appreciate wound care evaluation    Please call the ID service [phone] with questions or concerns over the weekend 89F with HTN, Dementia (baseline AOx1-2), hypothyroidism who was hospitalized 12/2023 after a fall.  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  Was in rehab for 3 months and discharged April with house calls program.  Over the past month, she had decreased PO intake and developed b/l decubitus ulcers buttocks.  Constipation.  Has 24 hour care.  Niece noted patient to be unresponsive 9/4 and patient was brought to ED by EMS.  No fever.  No leukocytosis.  Normal differential.  Decubitus noted with erythema on the right buttock.  Started on vancomycin / zosyn.  ESR 70s.  CRP 30s.  CT without findings of bony erosion.    Buttock decubitus  - superficially infected  - no evidence of sepsis  - vancomycin to 500mg IV daily  - if wound cx (-) mrsa, will stop vancomycin  - can continue zosyn  - short course  - appreciate wound care evaluation    I have discussed plan of care as detailed above with ACP [phone]    Please call the ID service [phone] with questions or concerns over the weekend

## 2024-09-06 NOTE — DIETITIAN INITIAL EVALUATION ADULT - NS FNS DIET ORDER
Diet, Soft and Bite Sized:   Supplement Feeding Modality:  Oral  Ensure Muscle Health Cans or Servings Per Day:  1       Frequency:  Three Times a day (09-06-24 @ 15:09)

## 2024-09-06 NOTE — DIETITIAN INITIAL EVALUATION ADULT - NSFNSADHERENCEPTAFT_GEN_A_CORE
Limited information available to be obtained from pt regarding appetite/PO intake prior to admission. Pt stated she is unsure of diet prior to admission.     Off Note, per Infectious Disease note on 9/5: Per pt's niece, "after the last admission in 1/2024, pt was discharged to rehab where she stayed for 3 months. Since then, pt has been gradually deconditioning (loss of appetite, bedbound), but more acutely worsened with development of open wound on bilateral buttocks for the past 3 weeks."

## 2024-09-06 NOTE — DIETITIAN INITIAL EVALUATION ADULT - PERTINENT LABORATORY DATA
09-05    138  |  105  |  8   ----------------------------<  93  4.6   |  21<L>  |  0.57    Ca    9.3      05 Sep 2024 07:05    TPro  6.8  /  Alb  2.7<L>  /  TBili  0.6  /  DBili  x   /  AST  13  /  ALT  8<L>  /  AlkPhos  75  09-05

## 2024-09-06 NOTE — DIETITIAN INITIAL EVALUATION ADULT - NSFNSNUTRCHEWSWALLOWFT_GEN_A_CORE
Pt reported no chewing or swallowing difficulties at this time. Defer diet texture/consistency to Medical Team.

## 2024-09-06 NOTE — DIETITIAN INITIAL EVALUATION ADULT - NSFNSPHYEXAMSKINFT_GEN_A_CORE
Pressure Injury 1: Left:, heel, Suspected deep tissue injury  Pressure Injury 2: Right:, buttocks, Unstageable  Pressure Injury 3: Left:, coccyx, Stage I  Pressure Injury 4: Left:, buttocks, Unstageable  (per nursing flow sheets)     Per Wound Care Note on 9/5/24:  "BL buttocks unstageable and Deep tissue pressure injuries  Lt heel DTPI"

## 2024-09-06 NOTE — DIETITIAN INITIAL EVALUATION ADULT - REASON INDICATOR FOR ASSESSMENT
RD consult warranted for Pressure Injury Stage 2 or >, "pressure ulcers"  Source: Patient, PCA at Bedside, Electronic Medical Record  Chart reviewed, events noted.

## 2024-09-06 NOTE — PROGRESS NOTE ADULT - SUBJECTIVE AND OBJECTIVE BOX
[facility] Division of Hospital Medicine  Daphnie Monique MD  Available on Teams Jatin    Patient is a 89y old  Female who presents with a chief complaint of ams for 1 day (06 Sep 2024 08:45)      SUBJECTIVE / OVERNIGHT EVENTS:  Patient evaluated at bedside, with no acute overnight events. States she is doing well, denies any f/c, CP, SOB or abd pain. No BM yet today.     ADDITIONAL REVIEW OF SYSTEMS: negative    MEDICATIONS  (STANDING):  chlorhexidine 2% Cloths 1 Application(s) Topical daily  enoxaparin Injectable 40 milliGRAM(s) SubCutaneous every 24 hours  levothyroxine 75 MICROGram(s) Oral daily  mupirocin 2% Nasal 1 Application(s) Both Nostrils two times a day  piperacillin/tazobactam IVPB.. 3.375 Gram(s) IV Intermittent every 8 hours  polyethylene glycol 3350 17 Gram(s) Oral two times a day  risperiDONE   Tablet 0.125 milliGRAM(s) Oral daily  senna 2 Tablet(s) Oral at bedtime  vancomycin  IVPB 500 milliGRAM(s) IV Intermittent every 24 hours    MEDICATIONS  (PRN):  acetaminophen     Tablet .. 650 milliGRAM(s) Oral every 6 hours PRN Temp greater or equal to 38C (100.4F), Mild Pain (1 - 3)  aluminum hydroxide/magnesium hydroxide/simethicone Suspension 30 milliLiter(s) Oral every 4 hours PRN Dyspepsia  melatonin 3 milliGRAM(s) Oral at bedtime PRN Insomnia  ondansetron Injectable 4 milliGRAM(s) IV Push every 8 hours PRN Nausea and/or Vomiting      CAPILLARY BLOOD GLUCOSE        I&O's Summary      PHYSICAL EXAM:  Vital Signs Last 24 Hrs  T(C): 36.6 (06 Sep 2024 11:06), Max: 37.2 (05 Sep 2024 20:24)  T(F): 97.9 (06 Sep 2024 11:06), Max: 99 (05 Sep 2024 20:24)  HR: 90 (06 Sep 2024 11:06) (90 - 100)  BP: 97/59 (06 Sep 2024 11:06) (97/59 - 103/64)  BP(mean): --  RR: 18 (06 Sep 2024 11:06) (18 - 18)  SpO2: 96% (06 Sep 2024 11:06) (94% - 96%)    Parameters below as of 06 Sep 2024 11:06  Patient On (Oxygen Delivery Method): room air    CONSTITUTIONAL: Frail, resting in bed comfortably, in no apparent distress  EYES: No conjunctival or scleral injection, non-icteric; PERRLA and symmetric  ENMT: Dry oral mucosa   RESPIRATORY: Breathing comfortably; lungs CTA without wheeze/rhonchi/rales  CARDIOVASCULAR: +S1S2, RRR, no M/G/R; pedal pulses full and symmetric; no lower extremity edema  GASTROINTESTINAL: No tenderness to palpation, soft, +BS throughout, no rebound/guarding  MUSCULOSKELETAL: no peripheral edema  SKIN: Patient declining current examination; but with reported deep sacral wounds with escharing  NEURO/PSYCH: A+O to self, location (able to tell writer she is in hospital), answering questions appropriately though [place] (per neice this is around baseline)    LABS:                        10.7   8.02  )-----------( 359      ( 05 Sep 2024 07:05 )             36.2     09-05    138  |  105  |  8   ----------------------------<  93  4.6   |  21<L>  |  0.57    Ca    9.3      05 Sep 2024 07:05    TPro  6.8  /  Alb  2.7<L>  /  TBili  0.6  /  DBili  x   /  AST  13  /  ALT  8<L>  /  AlkPhos  75  09-05          Urinalysis Basic - ( 05 Sep 2024 07:05 )    Color: x / Appearance: x / SG: x / pH: x  Gluc: 93 mg/dL / Ketone: x  / Bili: x / Urobili: x   Blood: x / Protein: x / Nitrite: x   Leuk Esterase: x / RBC: x / WBC x   Sq Epi: x / Non Sq Epi: x / Bacteria: x        Culture - Blood (collected 04 Sep 2024 12:08)  Source: .Blood Blood-Peripheral  Preliminary Report (05 Sep 2024 18:01):    No growth at 24 hours    Culture - Blood (collected 04 Sep 2024 12:00)  Source: .Blood Blood-Peripheral  Preliminary Report (05 Sep 2024 18:01):    No growth at 24 hours        RADIOLOGY & ADDITIONAL TESTS:  Results Reviewed:   Imaging Personally Reviewed:  Electrocardiogram Personally Reviewed:    COORDINATION OF CARE:  Care Discussed with Consultants/Other Providers [Y/N]:  Prior or Outpatient Records Reviewed [Y/N]:

## 2024-09-06 NOTE — DIETITIAN INITIAL EVALUATION ADULT - NSFNSGIIOFT_GEN_A_CORE
Pt reported no GI distress at this time.   - Ordered for aluminum hydroxide/magnesium hydroxide/simethicone Suspension and zofran (per orders).   - Last BM: 9/5 (per nursing flow sheets).   - Bowel Regimen: Dulcolax, Miralax, Senna (per orders).   Continue to monitor bowel movements and bowel movement regularity. Adjust bowel regimen as needed.  Note: Updated BMI using current RD bed scale weight does not meet BMI <19 criteria at this time. Continue to monitor.     Pt reported no GI distress at this time.   - Ordered for aluminum hydroxide/magnesium hydroxide/simethicone Suspension and zofran (per orders).   - Last BM: 9/5 (per nursing flow sheets).   - Bowel Regimen: Dulcolax, Miralax, Senna (per orders).   Continue to monitor bowel movements and bowel movement regularity. Adjust bowel regimen as needed.

## 2024-09-06 NOTE — DIETITIAN INITIAL EVALUATION ADULT - PERTINENT MEDS FT
MEDICATIONS  (STANDING):  bisacodyl 5 milliGRAM(s) Oral every 12 hours  chlorhexidine 2% Cloths 1 Application(s) Topical daily  enoxaparin Injectable 40 milliGRAM(s) SubCutaneous every 24 hours  levothyroxine 75 MICROGram(s) Oral daily  mupirocin 2% Nasal 1 Application(s) Both Nostrils two times a day  piperacillin/tazobactam IVPB.. 3.375 Gram(s) IV Intermittent every 8 hours  polyethylene glycol 3350 17 Gram(s) Oral two times a day  risperiDONE   Tablet 0.125 milliGRAM(s) Oral daily  senna 2 Tablet(s) Oral at bedtime  vancomycin  IVPB 500 milliGRAM(s) IV Intermittent every 24 hours    MEDICATIONS  (PRN):  acetaminophen     Tablet .. 650 milliGRAM(s) Oral every 6 hours PRN Temp greater or equal to 38C (100.4F), Mild Pain (1 - 3)  aluminum hydroxide/magnesium hydroxide/simethicone Suspension 30 milliLiter(s) Oral every 4 hours PRN Dyspepsia  melatonin 3 milliGRAM(s) Oral at bedtime PRN Insomnia  ondansetron Injectable 4 milliGRAM(s) IV Push every 8 hours PRN Nausea and/or Vomiting

## 2024-09-06 NOTE — DIETITIAN INITIAL EVALUATION ADULT - ETIOLOGY
related to increased metabolic demand for nutrients  related to inability to meet sufficient protein-energy needs in setting of diminished appetite, advanced age and altered mental status

## 2024-09-06 NOTE — DIETITIAN INITIAL EVALUATION ADULT - SIGNS/SYMPTOMS
as evidenced by pt meeting <50% EER in-house and suboptimal po intake >1 month prior to admission.  as evidenced by pt with deep tissue pressure injuries.

## 2024-09-06 NOTE — PROGRESS NOTE ADULT - SUBJECTIVE AND OBJECTIVE BOX
Patient is a 89y old  Female who presents with a chief complaint of ams for 1 day (04 Sep 2024 17:49)    f/u alted mental status / wound    Interval History/ROS:  no fever/chills.  constipated.  denies pain.  Remainder of ROS otherwise negative.     PAST MEDICAL & SURGICAL HISTORY:  Hypothyroid  Arthritis  Stomach ulcer  Hernia, hiatal  Spondylitis  Former smoker, stopped smoking many years ago  Hypothyroidism, unspecified type  Dementia, senile  History of bilateral hip replacements  Status post left knee replacement    Allergies  No Known Allergies    ANTIMICROBIALS:  piperacillin/tazobactam IVPB.. 3.375 every 8 hours (9/4-)  vancomycin  IVPB 500 every 24 hours (9/4-)    MEDICATIONS  (STANDING):  enoxaparin Injectable 40 every 24 hours  levothyroxine 75 daily  polyethylene glycol 3350 17 two times a day  risperiDONE   Tablet 0.125 daily  senna 2 at bedtime    Vital Signs Last 24 Hrs  T(F): 98.2 (09-06-24 @ 04:44), Max: 99 (09-05-24 @ 20:24)  HR: 100 (09-06-24 @ 04:44)  BP: 99/65 (09-06-24 @ 04:44)  RR: 18 (09-06-24 @ 04:44)  SpO2: 94% (09-06-24 @ 04:44) (94% - 98%)    PHYSICAL EXAM:  Constitutional: non-toxic, no distress  HEAD/EYES: anicteric  ENT:  supple  Cardiovascular:   normal S1, S2  Respiratory:  clear BS bilaterally  GI:  soft, tender to palpation, normal bowel sounds  :  n+external catheter  Musculoskeletal:  no synovitis  Neurologic:  grossly non-focal  Skin:    Psychiatric:  awake, appropriate mood                           10.7   8.02  )-----------( 359      ( 05 Sep 2024 07:05 )             36.2 09-05    138  |  105  |  8   ----------------------------<  93  4.6   |  21  |  0.57  Ca    9.3      05 Sep 2024 07:05  TPro  6.8  /  Alb  2.7  /  TBili  0.6  /  DBili  x   /  AST  13  /  ALT  8   /  AlkPhos  75  09-05    Sedimentation Rate, Erythrocyte: 73 (09-04 @ 12:52)  C-Reactive Protein: 36 (09-04 @ 12:52)    MICROBIOLOGY:  9/5 wound cx pending    Culture - Blood (collected 09-04-24 @ 12:08)  Source: .Blood Blood-Peripheral  Preliminary Report (09-05-24 @ 18:01):    No growth at 24 hours    Culture - Blood (collected 09-04-24 @ 12:00)  Source: .Blood Blood-Peripheral  Preliminary Report (09-05-24 @ 18:01):    No growth at 24 hours    RADIOLOGY:  imaging below personally reviewed and agree with findings    CT Head No Cont (09.04.24 @ 19:45)  IMPRESSION:  Mild chronic microvascular changes without evidence of an acute transcortical infarction or hemorrhage.    CT Abdomen and Pelvis w/ IV Cont (09.04.24 @ 13:37)  IMPRESSION:  *  Subcutaneous infiltration in the bilateral gluteal region and overlying the posterior aspect of the bony pelvis, which could reflect reported cellulitis. Focal area of cutaneous irregularity and small   amount of superficial soft tissue gas in the left gluteal region, which may represent the known decubitus ulcer. The gas could be due to open communication to the environment, versus an airforming infection. No drainable collection.  *  Cholelithiasis. Borderline common bile duct dilation. Hyperdensity in the distal common bile duct, which may represent choledocholithiasis or sludge. Consider further evaluation with abdominal MRI/MRCP.  *  Large rectal stool burden. Mild rectal wall thickening and perirectal fat infiltration, possibly reflecting stercoral proctitis.  *  Colonic diverticulosis. Wall thickening in the sigmoid colon, similar to the exam of 12/29/2023, potentially due to chronic diverticular disease or colitis. Focus of soft tissue density inseparable from the sigmoid colon measuring 3.2 x 2.1 cm, also seen on 12/29/2023; it is unclear if this is a tract of tissue related to colonic pathology, or a part of the left adnexa, possibly the left ovary. A neoplastic process is not entirely excluded.   Patient is a 89y old  Female who presents with a chief complaint of ams for 1 day (04 Sep 2024 17:49)    f/u alted mental status / wound    Interval History/ROS:  no fever/chills.  constipated.  denies pain.  Remainder of ROS otherwise negative.     PAST MEDICAL & SURGICAL HISTORY:  Hypothyroid  Arthritis  Stomach ulcer  Hernia, hiatal  Spondylitis  Former smoker, stopped smoking many years ago  Hypothyroidism, unspecified type  Dementia, senile  History of bilateral hip replacements  Status post left knee replacement    Allergies  No Known Allergies    ANTIMICROBIALS:  piperacillin/tazobactam IVPB.. 3.375 every 8 hours (9/4-)  vancomycin  IVPB 500 every 24 hours (9/4-)    MEDICATIONS  (STANDING):  enoxaparin Injectable 40 every 24 hours  levothyroxine 75 daily  polyethylene glycol 3350 17 two times a day  risperiDONE   Tablet 0.125 daily  senna 2 at bedtime    Vital Signs Last 24 Hrs  T(F): 98.2 (09-06-24 @ 04:44), Max: 99 (09-05-24 @ 20:24)  HR: 100 (09-06-24 @ 04:44)  BP: 99/65 (09-06-24 @ 04:44)  RR: 18 (09-06-24 @ 04:44)  SpO2: 94% (09-06-24 @ 04:44) (94% - 98%)    PHYSICAL EXAM:  Constitutional: non-toxic, no distress  HEAD/EYES: anicteric  ENT:  supple  Cardiovascular:   normal S1, S2  Respiratory:  clear BS bilaterally  GI:  soft, tender to palpation, normal bowel sounds  :  n+external catheter  Musculoskeletal:  no synovitis  Neurologic:  grossly non-focal  Skin:  backside evaluated yesterday noted bilateral ulcers; larger left sided with undermining and some erythema; right more like skin tear / sloughing  Psychiatric:  awake, appropriate mood                           10.7   8.02  )-----------( 359      ( 05 Sep 2024 07:05 )             36.2 09-05    138  |  105  |  8   ----------------------------<  93  4.6   |  21  |  0.57  Ca    9.3      05 Sep 2024 07:05  TPro  6.8  /  Alb  2.7  /  TBili  0.6  /  DBili  x   /  AST  13  /  ALT  8   /  AlkPhos  75  09-05    Sedimentation Rate, Erythrocyte: 73 (09-04 @ 12:52)  C-Reactive Protein: 36 (09-04 @ 12:52)    MICROBIOLOGY:  9/5 wound cx pending    Culture - Blood (collected 09-04-24 @ 12:08)  Source: .Blood Blood-Peripheral  Preliminary Report (09-05-24 @ 18:01):    No growth at 24 hours    Culture - Blood (collected 09-04-24 @ 12:00)  Source: .Blood Blood-Peripheral  Preliminary Report (09-05-24 @ 18:01):    No growth at 24 hours    RADIOLOGY:  imaging below personally reviewed and agree with findings    CT Head No Cont (09.04.24 @ 19:45)  IMPRESSION:  Mild chronic microvascular changes without evidence of an acute transcortical infarction or hemorrhage.    CT Abdomen and Pelvis w/ IV Cont (09.04.24 @ 13:37)  IMPRESSION:  *  Subcutaneous infiltration in the bilateral gluteal region and overlying the posterior aspect of the bony pelvis, which could reflect reported cellulitis. Focal area of cutaneous irregularity and small   amount of superficial soft tissue gas in the left gluteal region, which may represent the known decubitus ulcer. The gas could be due to open communication to the environment, versus an airforming infection. No drainable collection.  *  Cholelithiasis. Borderline common bile duct dilation. Hyperdensity in the distal common bile duct, which may represent choledocholithiasis or sludge. Consider further evaluation with abdominal MRI/MRCP.  *  Large rectal stool burden. Mild rectal wall thickening and perirectal fat infiltration, possibly reflecting stercoral proctitis.  *  Colonic diverticulosis. Wall thickening in the sigmoid colon, similar to the exam of 12/29/2023, potentially due to chronic diverticular disease or colitis. Focus of soft tissue density inseparable from the sigmoid colon measuring 3.2 x 2.1 cm, also seen on 12/29/2023; it is unclear if this is a tract of tissue related to colonic pathology, or a part of the left adnexa, possibly the left ovary. A neoplastic process is not entirely excluded.

## 2024-09-06 NOTE — DIETITIAN INITIAL EVALUATION ADULT - OTHER INFO
Weights:  - UBW (per patient): Pt stated she is unsure of her UBW and weight changes prior to admission at this time.   - Dosing Weight (per chart): 100 pounds (9/4) (question accuracy? not consistent with patient appearance) (stated)  - Daily Weights (per flow sheets): No daily weights noted to assess at this time.   - Bed Scale Weight (taken by RD): 135.2 pounds (9/6) (USED FOR BMI)  - Per Previous RD Note on 12/31/23: "130.1 pounds"  - Per [facility] HIE: 100.1 pounds (9/4/24) (question accuracy? not consistent with appearance?), 160 pounds (8/1/24), 160 pounds (4/21/24), 130.1 pounds (12/29/23), 150 pounds (5/23/21)  Weight fluctuations/discrepancies noted per [place] HIE? RD to continue to monitor weights and trends as able.     Nutritional Concerns:  - Pt with sacral wound (per chart). Ordered for antibiotics in-house (per orders).  - Pt with cholelithiases (per chart).   - Pt with hypothyroidism (per chart), ordered for synthroid (per orders).

## 2024-09-06 NOTE — DIETITIAN INITIAL EVALUATION ADULT - ENERGY INTAKE
Poor (<50%) Currently in-house, pt noted with poor po intake. Per PCA at bedside, pt consumed <50% of breakfast this morning. RD observed breakfast tray at bedside, <25% consumed. Pt noted to be consuming 0-25% of meals in-house (per flow sheets). Pt declined Ensure, ProSource Gelatein, Mighty Shakes, amenable to Magic Cup 2x/day and Smoothie of the Day 2x/day in-house. RD attempted to obtain food preferences, however pt did not provide any at this time.

## 2024-09-07 DIAGNOSIS — E87.6 HYPOKALEMIA: ICD-10-CM

## 2024-09-07 LAB
-  CLINDAMYCIN: SIGNIFICANT CHANGE UP
-  ERYTHROMYCIN: SIGNIFICANT CHANGE UP
-  GENTAMICIN: SIGNIFICANT CHANGE UP
-  OXACILLIN: SIGNIFICANT CHANGE UP
-  PENICILLIN: SIGNIFICANT CHANGE UP
-  RIFAMPIN: SIGNIFICANT CHANGE UP
-  TETRACYCLINE: SIGNIFICANT CHANGE UP
-  TRIMETHOPRIM/SULFAMETHOXAZOLE: SIGNIFICANT CHANGE UP
-  VANCOMYCIN: SIGNIFICANT CHANGE UP
ADD ON TEST-SPECIMEN IN LAB: SIGNIFICANT CHANGE UP
ALBUMIN SERPL ELPH-MCNC: 2.6 G/DL — LOW (ref 3.3–5)
ALP SERPL-CCNC: 70 U/L — SIGNIFICANT CHANGE UP (ref 40–120)
ALT FLD-CCNC: 6 U/L — LOW (ref 10–45)
ANION GAP SERPL CALC-SCNC: 12 MMOL/L — SIGNIFICANT CHANGE UP (ref 5–17)
AST SERPL-CCNC: 9 U/L — LOW (ref 10–40)
BASOPHILS # BLD AUTO: 0.02 K/UL — SIGNIFICANT CHANGE UP (ref 0–0.2)
BASOPHILS NFR BLD AUTO: 0.3 % — SIGNIFICANT CHANGE UP (ref 0–2)
BILIRUB SERPL-MCNC: 0.8 MG/DL — SIGNIFICANT CHANGE UP (ref 0.2–1.2)
BUN SERPL-MCNC: 7 MG/DL — SIGNIFICANT CHANGE UP (ref 7–23)
CALCIUM SERPL-MCNC: 8.9 MG/DL — SIGNIFICANT CHANGE UP (ref 8.4–10.5)
CHLORIDE SERPL-SCNC: 103 MMOL/L — SIGNIFICANT CHANGE UP (ref 96–108)
CO2 SERPL-SCNC: 22 MMOL/L — SIGNIFICANT CHANGE UP (ref 22–31)
CREAT SERPL-MCNC: 0.57 MG/DL — SIGNIFICANT CHANGE UP (ref 0.5–1.3)
EGFR: 87 ML/MIN/1.73M2 — SIGNIFICANT CHANGE UP
EOSINOPHIL # BLD AUTO: 0.11 K/UL — SIGNIFICANT CHANGE UP (ref 0–0.5)
EOSINOPHIL NFR BLD AUTO: 1.5 % — SIGNIFICANT CHANGE UP (ref 0–6)
GLUCOSE SERPL-MCNC: 144 MG/DL — HIGH (ref 70–99)
HCT VFR BLD CALC: 32.9 % — LOW (ref 34.5–45)
HGB BLD-MCNC: 10.5 G/DL — LOW (ref 11.5–15.5)
IMM GRANULOCYTES NFR BLD AUTO: 0.3 % — SIGNIFICANT CHANGE UP (ref 0–0.9)
LYMPHOCYTES # BLD AUTO: 1.44 K/UL — SIGNIFICANT CHANGE UP (ref 1–3.3)
LYMPHOCYTES # BLD AUTO: 20 % — SIGNIFICANT CHANGE UP (ref 13–44)
MAGNESIUM SERPL-MCNC: 2.3 MG/DL — SIGNIFICANT CHANGE UP (ref 1.6–2.6)
MCHC RBC-ENTMCNC: 26.6 PG — LOW (ref 27–34)
MCHC RBC-ENTMCNC: 31.9 GM/DL — LOW (ref 32–36)
MCV RBC AUTO: 83.5 FL — SIGNIFICANT CHANGE UP (ref 80–100)
METHOD TYPE: SIGNIFICANT CHANGE UP
MONOCYTES # BLD AUTO: 0.46 K/UL — SIGNIFICANT CHANGE UP (ref 0–0.9)
MONOCYTES NFR BLD AUTO: 6.4 % — SIGNIFICANT CHANGE UP (ref 2–14)
NEUTROPHILS # BLD AUTO: 5.16 K/UL — SIGNIFICANT CHANGE UP (ref 1.8–7.4)
NEUTROPHILS NFR BLD AUTO: 71.5 % — SIGNIFICANT CHANGE UP (ref 43–77)
NRBC # BLD: 0 /100 WBCS — SIGNIFICANT CHANGE UP (ref 0–0)
PLATELET # BLD AUTO: 406 K/UL — HIGH (ref 150–400)
POTASSIUM SERPL-MCNC: 2.8 MMOL/L — CRITICAL LOW (ref 3.5–5.3)
POTASSIUM SERPL-SCNC: 2.8 MMOL/L — CRITICAL LOW (ref 3.5–5.3)
PROT SERPL-MCNC: 6.8 G/DL — SIGNIFICANT CHANGE UP (ref 6–8.3)
RBC # BLD: 3.94 M/UL — SIGNIFICANT CHANGE UP (ref 3.8–5.2)
RBC # FLD: 14.7 % — HIGH (ref 10.3–14.5)
SODIUM SERPL-SCNC: 137 MMOL/L — SIGNIFICANT CHANGE UP (ref 135–145)
VANCOMYCIN TROUGH SERPL-MCNC: 25.6 UG/ML — CRITICAL HIGH (ref 10–20)
WBC # BLD: 7.21 K/UL — SIGNIFICANT CHANGE UP (ref 3.8–10.5)
WBC # FLD AUTO: 7.21 K/UL — SIGNIFICANT CHANGE UP (ref 3.8–10.5)

## 2024-09-07 PROCEDURE — 99232 SBSQ HOSP IP/OBS MODERATE 35: CPT

## 2024-09-07 RX ORDER — POTASSIUM CHLORIDE 10 MEQ
40 TABLET, EXT RELEASE, PARTICLES/CRYSTALS ORAL ONCE
Refills: 0 | Status: COMPLETED | OUTPATIENT
Start: 2024-09-07 | End: 2024-09-07

## 2024-09-07 RX ORDER — POTASSIUM CHLORIDE 10 MEQ
10 TABLET, EXT RELEASE, PARTICLES/CRYSTALS ORAL
Refills: 0 | Status: COMPLETED | OUTPATIENT
Start: 2024-09-07 | End: 2024-09-07

## 2024-09-07 RX ADMIN — ACETAMINOPHEN 650 MILLIGRAM(S): 325 TABLET ORAL at 18:48

## 2024-09-07 RX ADMIN — Medication 1 APPLICATION(S): at 18:46

## 2024-09-07 RX ADMIN — Medication 40 MILLIEQUIVALENT(S): at 14:54

## 2024-09-07 RX ADMIN — Medication 2 TABLET(S): at 21:54

## 2024-09-07 RX ADMIN — RISPERIDONE 0.12 MILLIGRAM(S): 0.25 TABLET, FILM COATED ORAL at 14:55

## 2024-09-07 RX ADMIN — Medication 100 MILLIGRAM(S): at 05:31

## 2024-09-07 RX ADMIN — Medication 10 MILLIGRAM(S): at 18:48

## 2024-09-07 RX ADMIN — Medication 5 MILLIGRAM(S): at 18:46

## 2024-09-07 RX ADMIN — Medication 500 MILLIGRAM(S): at 14:55

## 2024-09-07 RX ADMIN — Medication 1 TABLET(S): at 14:55

## 2024-09-07 RX ADMIN — PIPERACILLIN SODIUM AND TAZOBACTAM SODIUM 25 GRAM(S): 3; .375 INJECTION, POWDER, FOR SOLUTION INTRAVENOUS at 10:03

## 2024-09-07 RX ADMIN — Medication 100 MILLIEQUIVALENT(S): at 12:05

## 2024-09-07 RX ADMIN — POLYETHYLENE GLYCOL 3350 17 GRAM(S): 17 POWDER, FOR SOLUTION ORAL at 05:31

## 2024-09-07 RX ADMIN — ENOXAPARIN SODIUM 40 MILLIGRAM(S): 100 INJECTION SUBCUTANEOUS at 18:46

## 2024-09-07 RX ADMIN — Medication 1 APPLICATION(S): at 05:31

## 2024-09-07 RX ADMIN — Medication 100 MILLIEQUIVALENT(S): at 09:11

## 2024-09-07 RX ADMIN — PIPERACILLIN SODIUM AND TAZOBACTAM SODIUM 25 GRAM(S): 3; .375 INJECTION, POWDER, FOR SOLUTION INTRAVENOUS at 01:44

## 2024-09-07 RX ADMIN — PIPERACILLIN SODIUM AND TAZOBACTAM SODIUM 25 GRAM(S): 3; .375 INJECTION, POWDER, FOR SOLUTION INTRAVENOUS at 18:45

## 2024-09-07 RX ADMIN — Medication 100 MILLIEQUIVALENT(S): at 10:21

## 2024-09-07 RX ADMIN — CHLORHEXIDINE GLUCONATE 1 APPLICATION(S): 40 SOLUTION TOPICAL at 14:55

## 2024-09-07 RX ADMIN — Medication 75 MICROGRAM(S): at 05:30

## 2024-09-07 RX ADMIN — Medication 5 MILLIGRAM(S): at 05:31

## 2024-09-07 RX ADMIN — POLYETHYLENE GLYCOL 3350 17 GRAM(S): 17 POWDER, FOR SOLUTION ORAL at 18:45

## 2024-09-07 NOTE — PROGRESS NOTE ADULT - SUBJECTIVE AND OBJECTIVE BOX
[facility] Division of Hospital Medicine  Daphnie Monique MD  Available on Teams Jatin    Patient is a 89y old  Female who presents with a chief complaint of Cellulitis of trunk     (06 Sep 2024 15:28)      SUBJECTIVE / OVERNIGHT EVENTS:  Patient evaluated at bedside. Awake, denies any acute complaints, states "my stomach is bothering me, but its bothered me for years, ill be fine." Declined further history, asking to be left alone.     ADDITIONAL REVIEW OF SYSTEMS: negative    MEDICATIONS  (STANDING):  ascorbic acid 500 milliGRAM(s) Oral daily  bisacodyl 5 milliGRAM(s) Oral every 12 hours  bisacodyl Suppository 10 milliGRAM(s) Rectal once  chlorhexidine 2% Cloths 1 Application(s) Topical daily  enoxaparin Injectable 40 milliGRAM(s) SubCutaneous every 24 hours  levothyroxine 75 MICROGram(s) Oral daily  multivitamin 1 Tablet(s) Oral daily  mupirocin 2% Nasal 1 Application(s) Both Nostrils two times a day  piperacillin/tazobactam IVPB.. 3.375 Gram(s) IV Intermittent every 8 hours  polyethylene glycol 3350 17 Gram(s) Oral two times a day  risperiDONE   Tablet 0.125 milliGRAM(s) Oral daily  senna 2 Tablet(s) Oral at bedtime    MEDICATIONS  (PRN):  acetaminophen     Tablet .. 650 milliGRAM(s) Oral every 6 hours PRN Temp greater or equal to 38C (100.4F), Mild Pain (1 - 3)  aluminum hydroxide/magnesium hydroxide/simethicone Suspension 30 milliLiter(s) Oral every 4 hours PRN Dyspepsia  melatonin 3 milliGRAM(s) Oral at bedtime PRN Insomnia  ondansetron Injectable 4 milliGRAM(s) IV Push every 8 hours PRN Nausea and/or Vomiting      CAPILLARY BLOOD GLUCOSE        I&O's Summary      PHYSICAL EXAM:  Vital Signs Last 24 Hrs  T(C): 36.7 (07 Sep 2024 10:54), Max: 36.7 (06 Sep 2024 21:34)  T(F): 98 (07 Sep 2024 10:54), Max: 98.1 (06 Sep 2024 21:34)  HR: 99 (07 Sep 2024 10:54) (76 - 108)  BP: 99/66 (07 Sep 2024 10:54) (99/66 - 108/74)  BP(mean): --  RR: 18 (07 Sep 2024 10:54) (18 - 20)  SpO2: 99% (07 Sep 2024 10:54) (94% - 99%)    Parameters below as of 07 Sep 2024 10:54  Patient On (Oxygen Delivery Method): room air      CONSTITUTIONAL: Frail, resting in bed comfortably, in no apparent distress  EYES: No conjunctival or scleral injection, non-icteric; PERRLA and symmetric  ENMT: Dry oral mucosa   RESPIRATORY: Breathing comfortably; lungs CTA without wheeze/rhonchi/rales  CARDIOVASCULAR: +S1S2, RRR, no M/G/R; pedal pulses full and symmetric; no lower extremity edema  GASTROINTESTINAL: No tenderness to palpation, soft, pt swatting hand/declining further examination  MUSCULOSKELETAL: no peripheral edema  SKIN: Patient declining current examination; but with reported deep sacral wounds with escharing  NEURO/PSYCH: A+O to self, location (able to tell writer she is in hospital), answering questions appropriately though [place] which is reportedly around baseline for pt      LABS:                        10.5   7.21  )-----------( 406      ( 07 Sep 2024 06:39 )             32.9     09-07    137  |  103  |  7   ----------------------------<  144<H>  2.8<LL>   |  22  |  0.57    Ca    8.9      07 Sep 2024 06:39  Mg     2.3     09-07    TPro  6.8  /  Alb  2.6<L>  /  TBili  0.8  /  DBili  x   /  AST  9<L>  /  ALT  6<L>  /  AlkPhos  70  09-07          Urinalysis Basic - ( 07 Sep 2024 06:39 )    Color: x / Appearance: x / SG: x / pH: x  Gluc: 144 mg/dL / Ketone: x  / Bili: x / Urobili: x   Blood: x / Protein: x / Nitrite: x   Leuk Esterase: x / RBC: x / WBC x   Sq Epi: x / Non Sq Epi: x / Bacteria: x        Culture - Wound Aerobic/Anaerobic (collected 05 Sep 2024 07:33)  Source: Decub Decub  Preliminary Report (06 Sep 2024 16:32):    Moderate Staphylococcus aureus    Commensal yuliana consistent with body site  Organism: Staphylococcus aureus (07 Sep 2024 08:21)  Organism: Staphylococcus aureus (07 Sep 2024 08:21)        RADIOLOGY & ADDITIONAL TESTS:  Results Reviewed:   Imaging Personally Reviewed:  Electrocardiogram Personally Reviewed:    COORDINATION OF CARE:  Care Discussed with Consultants/Other Providers [Y/N]:  Prior or Outpatient Records Reviewed [Y/N]:

## 2024-09-08 LAB
ANION GAP SERPL CALC-SCNC: 13 MMOL/L — SIGNIFICANT CHANGE UP (ref 5–17)
BUN SERPL-MCNC: 8 MG/DL — SIGNIFICANT CHANGE UP (ref 7–23)
CALCIUM SERPL-MCNC: 9 MG/DL — SIGNIFICANT CHANGE UP (ref 8.4–10.5)
CHLORIDE SERPL-SCNC: 105 MMOL/L — SIGNIFICANT CHANGE UP (ref 96–108)
CO2 SERPL-SCNC: 20 MMOL/L — LOW (ref 22–31)
CREAT SERPL-MCNC: 0.56 MG/DL — SIGNIFICANT CHANGE UP (ref 0.5–1.3)
EGFR: 88 ML/MIN/1.73M2 — SIGNIFICANT CHANGE UP
GLUCOSE SERPL-MCNC: 110 MG/DL — HIGH (ref 70–99)
MAGNESIUM SERPL-MCNC: 2.2 MG/DL — SIGNIFICANT CHANGE UP (ref 1.6–2.6)
POTASSIUM SERPL-MCNC: 3.3 MMOL/L — LOW (ref 3.5–5.3)
POTASSIUM SERPL-SCNC: 3.3 MMOL/L — LOW (ref 3.5–5.3)
SODIUM SERPL-SCNC: 138 MMOL/L — SIGNIFICANT CHANGE UP (ref 135–145)
VANCOMYCIN TROUGH SERPL-MCNC: 6.7 UG/ML — LOW (ref 10–20)

## 2024-09-08 PROCEDURE — 99232 SBSQ HOSP IP/OBS MODERATE 35: CPT

## 2024-09-08 RX ORDER — POTASSIUM CHLORIDE 10 MEQ
20 TABLET, EXT RELEASE, PARTICLES/CRYSTALS ORAL
Refills: 0 | Status: COMPLETED | OUTPATIENT
Start: 2024-09-08 | End: 2024-09-08

## 2024-09-08 RX ADMIN — Medication 500 MILLIGRAM(S): at 13:20

## 2024-09-08 RX ADMIN — PIPERACILLIN SODIUM AND TAZOBACTAM SODIUM 25 GRAM(S): 3; .375 INJECTION, POWDER, FOR SOLUTION INTRAVENOUS at 09:14

## 2024-09-08 RX ADMIN — Medication 20 MILLIEQUIVALENT(S): at 13:00

## 2024-09-08 RX ADMIN — Medication 75 MICROGRAM(S): at 09:13

## 2024-09-08 RX ADMIN — CHLORHEXIDINE GLUCONATE 1 APPLICATION(S): 40 SOLUTION TOPICAL at 13:20

## 2024-09-08 RX ADMIN — Medication 20 MILLIEQUIVALENT(S): at 09:13

## 2024-09-08 RX ADMIN — PIPERACILLIN SODIUM AND TAZOBACTAM SODIUM 25 GRAM(S): 3; .375 INJECTION, POWDER, FOR SOLUTION INTRAVENOUS at 17:46

## 2024-09-08 RX ADMIN — Medication 1 APPLICATION(S): at 17:47

## 2024-09-08 RX ADMIN — Medication 1 APPLICATION(S): at 06:14

## 2024-09-08 RX ADMIN — ENOXAPARIN SODIUM 40 MILLIGRAM(S): 100 INJECTION SUBCUTANEOUS at 17:47

## 2024-09-08 RX ADMIN — RISPERIDONE 0.12 MILLIGRAM(S): 0.25 TABLET, FILM COATED ORAL at 13:00

## 2024-09-08 RX ADMIN — PIPERACILLIN SODIUM AND TAZOBACTAM SODIUM 25 GRAM(S): 3; .375 INJECTION, POWDER, FOR SOLUTION INTRAVENOUS at 01:21

## 2024-09-08 RX ADMIN — Medication 1 TABLET(S): at 13:01

## 2024-09-08 NOTE — PHYSICAL THERAPY INITIAL EVALUATION ADULT - PERTINENT HX OF CURRENT PROBLEM, REHAB EVAL
88 y/o F with h/o advanced dementia (AOx1-2), bedbound, hypothyroidism who presents with 1 day of AMS. Pt admitted for sacral wound infection requiring IV abx.

## 2024-09-08 NOTE — PHYSICAL THERAPY INITIAL EVALUATION ADULT - NSPTDISCHREC_GEN_A_CORE
return to home with prior level of assistance provided  by 24 hour private home health aides/No skilled PT needs

## 2024-09-08 NOTE — PROGRESS NOTE ADULT - SUBJECTIVE AND OBJECTIVE BOX
[facility] Division of Hospital Medicine  Daphnie Monique MD  Available on Teams Jatin    Patient is a 89y old  Female who presents with a chief complaint of ams for 1 day (07 Sep 2024 16:04)      SUBJECTIVE / OVERNIGHT EVENTS:  Patient evaluated at bedside. Awake, pleasant, denies any acute complaints currently. With no acute overnight events, per RN had large BM yesterday and overnight.     ADDITIONAL REVIEW OF SYSTEMS: negative    MEDICATIONS  (STANDING):  ascorbic acid 500 milliGRAM(s) Oral daily  chlorhexidine 2% Cloths 1 Application(s) Topical daily  enoxaparin Injectable 40 milliGRAM(s) SubCutaneous every 24 hours  levothyroxine 75 MICROGram(s) Oral daily  multivitamin 1 Tablet(s) Oral daily  mupirocin 2% Nasal 1 Application(s) Both Nostrils two times a day  piperacillin/tazobactam IVPB.. 3.375 Gram(s) IV Intermittent every 8 hours  polyethylene glycol 3350 17 Gram(s) Oral two times a day  risperiDONE   Tablet 0.125 milliGRAM(s) Oral daily  senna 2 Tablet(s) Oral at bedtime    MEDICATIONS  (PRN):  acetaminophen     Tablet .. 650 milliGRAM(s) Oral every 6 hours PRN Temp greater or equal to 38C (100.4F), Mild Pain (1 - 3)  aluminum hydroxide/magnesium hydroxide/simethicone Suspension 30 milliLiter(s) Oral every 4 hours PRN Dyspepsia  melatonin 3 milliGRAM(s) Oral at bedtime PRN Insomnia  ondansetron Injectable 4 milliGRAM(s) IV Push every 8 hours PRN Nausea and/or Vomiting      CAPILLARY BLOOD GLUCOSE        I&O's Summary    07 Sep 2024 07:01  -  08 Sep 2024 07:00  --------------------------------------------------------  IN: 100 mL / OUT: 0 mL / NET: 100 mL        PHYSICAL EXAM:  Vital Signs Last 24 Hrs  T(C): 36.5 (08 Sep 2024 09:55), Max: 37 (08 Sep 2024 05:20)  T(F): 97.7 (08 Sep 2024 09:55), Max: 98.6 (08 Sep 2024 05:20)  HR: 91 (08 Sep 2024 09:55) (76 - 100)  BP: 103/59 (08 Sep 2024 09:55) (103/59 - 113/76)  BP(mean): --  RR: 18 (08 Sep 2024 09:55) (18 - 18)  SpO2: 100% (08 Sep 2024 09:55) (96% - 100%)    Parameters below as of 08 Sep 2024 09:55  Patient On (Oxygen Delivery Method): room air    CONSTITUTIONAL: Frail, resting in bed comfortably, in no apparent distress  EYES: No conjunctival or scleral injection, non-icteric; PERRLA and symmetric  ENMT: Moist oral mucosa   RESPIRATORY: Breathing comfortably; lungs CTA without wheeze/rhonchi/rales  CARDIOVASCULAR: +S1S2, RRR, no M/G/R; pedal pulses full and symmetric; no lower extremity edema  GASTROINTESTINAL: Soft, no tenderness to palpation, +BS, no rebound/guarding  MUSCULOSKELETAL: no peripheral edema  SKIN: Patient declining current examination; but with reported deep sacral wounds with escharing  NEURO/PSYCH: A+O to self, location (able to tell writer she is in hospital), answering questions appropriately though [place] which is reportedly around baseline for pt    LABS:                        10.5   7.21  )-----------( 406      ( 07 Sep 2024 06:39 )             32.9     09-08    138  |  105  |  8   ----------------------------<  110<H>  3.3<L>   |  20<L>  |  0.56    Ca    9.0      08 Sep 2024 05:30  Mg     2.2     09-08    TPro  6.8  /  Alb  2.6<L>  /  TBili  0.8  /  DBili  x   /  AST  9<L>  /  ALT  6<L>  /  AlkPhos  70  09-07          Urinalysis Basic - ( 08 Sep 2024 05:30 )    Color: x / Appearance: x / SG: x / pH: x  Gluc: 110 mg/dL / Ketone: x  / Bili: x / Urobili: x   Blood: x / Protein: x / Nitrite: x   Leuk Esterase: x / RBC: x / WBC x   Sq Epi: x / Non Sq Epi: x / Bacteria: x          RADIOLOGY & ADDITIONAL TESTS:  Results Reviewed:   Imaging Personally Reviewed:  Electrocardiogram Personally Reviewed:    COORDINATION OF CARE:  Care Discussed with Consultants/Other Providers [Y/N]:  Prior or Outpatient Records Reviewed [Y/N]:   Warm

## 2024-09-08 NOTE — PHYSICAL THERAPY INITIAL EVALUATION ADULT - ADDITIONAL COMMENTS
Pt lives in an apartment with elevator  and is dependent with adls/mobility, has 24/7 private hire aides, 3 aides rotate and has overlap hours everyday during time when pt needs to be set up.

## 2024-09-08 NOTE — PROGRESS NOTE ADULT - TIME BILLING
- Reviewing, and interpreting labs and testing.  - Independently obtaining a review of systems and performing a physical exam  - Reviewing consultant documentation/recommendations in addition to discussing plan of care with consultants.  - Counselling and educating patient and family regarding interpretation of aforementioned items and plan of care.

## 2024-09-09 ENCOUNTER — TRANSCRIPTION ENCOUNTER (OUTPATIENT)
Age: 89
End: 2024-09-09

## 2024-09-09 LAB
ANION GAP SERPL CALC-SCNC: 12 MMOL/L — SIGNIFICANT CHANGE UP (ref 5–17)
BUN SERPL-MCNC: 6 MG/DL — LOW (ref 7–23)
CALCIUM SERPL-MCNC: 8.8 MG/DL — SIGNIFICANT CHANGE UP (ref 8.4–10.5)
CHLORIDE SERPL-SCNC: 107 MMOL/L — SIGNIFICANT CHANGE UP (ref 96–108)
CO2 SERPL-SCNC: 22 MMOL/L — SIGNIFICANT CHANGE UP (ref 22–31)
CREAT SERPL-MCNC: 0.56 MG/DL — SIGNIFICANT CHANGE UP (ref 0.5–1.3)
CULTURE RESULTS: SIGNIFICANT CHANGE UP
CULTURE RESULTS: SIGNIFICANT CHANGE UP
EGFR: 88 ML/MIN/1.73M2 — SIGNIFICANT CHANGE UP
GLUCOSE SERPL-MCNC: 94 MG/DL — SIGNIFICANT CHANGE UP (ref 70–99)
POTASSIUM SERPL-MCNC: 3.3 MMOL/L — LOW (ref 3.5–5.3)
POTASSIUM SERPL-SCNC: 3.3 MMOL/L — LOW (ref 3.5–5.3)
SODIUM SERPL-SCNC: 141 MMOL/L — SIGNIFICANT CHANGE UP (ref 135–145)
SPECIMEN SOURCE: SIGNIFICANT CHANGE UP
SPECIMEN SOURCE: SIGNIFICANT CHANGE UP

## 2024-09-09 PROCEDURE — 99232 SBSQ HOSP IP/OBS MODERATE 35: CPT

## 2024-09-09 RX ORDER — POLYETHYLENE GLYCOL 3350 17 G/17G
17 POWDER, FOR SOLUTION ORAL
Refills: 0 | Status: DISCONTINUED | OUTPATIENT
Start: 2024-09-09 | End: 2024-09-11

## 2024-09-09 RX ORDER — POTASSIUM CHLORIDE 10 MEQ
40 TABLET, EXT RELEASE, PARTICLES/CRYSTALS ORAL ONCE
Refills: 0 | Status: COMPLETED | OUTPATIENT
Start: 2024-09-09 | End: 2024-09-09

## 2024-09-09 RX ORDER — SENNA 187 MG
2 TABLET ORAL AT BEDTIME
Refills: 0 | Status: DISCONTINUED | OUTPATIENT
Start: 2024-09-09 | End: 2024-09-11

## 2024-09-09 RX ADMIN — Medication 500 MILLIGRAM(S): at 11:42

## 2024-09-09 RX ADMIN — POLYETHYLENE GLYCOL 3350 17 GRAM(S): 17 POWDER, FOR SOLUTION ORAL at 18:29

## 2024-09-09 RX ADMIN — PIPERACILLIN SODIUM AND TAZOBACTAM SODIUM 25 GRAM(S): 3; .375 INJECTION, POWDER, FOR SOLUTION INTRAVENOUS at 18:29

## 2024-09-09 RX ADMIN — RISPERIDONE 0.12 MILLIGRAM(S): 0.25 TABLET, FILM COATED ORAL at 11:41

## 2024-09-09 RX ADMIN — PIPERACILLIN SODIUM AND TAZOBACTAM SODIUM 25 GRAM(S): 3; .375 INJECTION, POWDER, FOR SOLUTION INTRAVENOUS at 01:07

## 2024-09-09 RX ADMIN — Medication 1 APPLICATION(S): at 05:14

## 2024-09-09 RX ADMIN — Medication 1 TABLET(S): at 11:40

## 2024-09-09 RX ADMIN — POLYETHYLENE GLYCOL 3350 17 GRAM(S): 17 POWDER, FOR SOLUTION ORAL at 10:15

## 2024-09-09 RX ADMIN — Medication 1 APPLICATION(S): at 18:30

## 2024-09-09 RX ADMIN — Medication 75 MICROGRAM(S): at 05:15

## 2024-09-09 RX ADMIN — CHLORHEXIDINE GLUCONATE 1 APPLICATION(S): 40 SOLUTION TOPICAL at 11:42

## 2024-09-09 RX ADMIN — Medication 2 TABLET(S): at 21:54

## 2024-09-09 RX ADMIN — PIPERACILLIN SODIUM AND TAZOBACTAM SODIUM 25 GRAM(S): 3; .375 INJECTION, POWDER, FOR SOLUTION INTRAVENOUS at 10:14

## 2024-09-09 RX ADMIN — ENOXAPARIN SODIUM 40 MILLIGRAM(S): 100 INJECTION SUBCUTANEOUS at 18:30

## 2024-09-09 NOTE — PROGRESS NOTE ADULT - SUBJECTIVE AND OBJECTIVE BOX
Patient is a 89y old  Female who presents with a chief complaint of ams for 1 day (04 Sep 2024 17:49)    f/u alted mental status / wound    Interval History/ROS:  no fever/chills.  constipated.  sleepy this morning.  ROS difficult     PAST MEDICAL & SURGICAL HISTORY:  Hypothyroid  Arthritis  Stomach ulcer  Hernia, hiatal  Spondylitis  Former smoker, stopped smoking many years ago  Hypothyroidism, unspecified type  Dementia, senile  History of bilateral hip replacements  Status post left knee replacement    Allergies  No Known Allergies    ANTIMICROBIALS:  vancomycin  IVPB 500 every 24 hours (9/4-9/7)    active:  piperacillin/tazobactam IVPB.. 3.375 every 8 hours (9/4-)    MEDICATIONS  (STANDING):  enoxaparin Injectable 40 every 24 hours  levothyroxine 75 daily  polyethylene glycol 3350 17 two times a day  risperiDONE   Tablet 0.125 daily  senna 2 at bedtime    Vital Signs Last 24 Hrs  T(F): 98.1 (09-09-24 @ 04:50), Max: 98.4 (09-08-24 @ 20:57)  HR: 84 (09-09-24 @ 04:50)  BP: 100/53 (09-09-24 @ 04:50)  RR: 19 (09-09-24 @ 04:50)  SpO2: 99% (09-09-24 @ 04:50) (97% - 99%)  Wt(kg): --    PHYSICAL EXAM:  Constitutional: non-toxic  HEAD/EYES: anicteric  ENT:  supple  Cardiovascular:   normal S1, S2  Respiratory:  clear BS bilaterally  GI:  soft, less distended  :  +external catheter  Musculoskeletal:  no synovitis  Neurologic:  difficult to assess  Skin:  no rash, back ulcers, did not evaluate today  Psychiatric:  did not assess    141  |  107  |  6   ----------------------------<  94  3.3   |  22  |  0.56  Ca    8.8      09 Sep 2024 07:33Mg     2.2     09-08    Sedimentation Rate, Erythrocyte: 73 (09-04 @ 12:52)  C-Reactive Protein: 36 (09-04 @ 12:52)    MICROBIOLOGY:  Culture - Wound Aerobic/Anaerobic (collected 09-05-24 @ 07:33)  Source: Decub Decub  Preliminary Report (09-06-24 @ 16:32):    Moderate Staphylococcus aureus    Commensal yuliana consistent with body site  Organism: Staphylococcus aureus (09-07-24 @ 08:21)  Organism: Staphylococcus aureus (09-07-24 @ 08:21)      Method Type: SARAHY      -  Clindamycin: S 0.5      -  Erythromycin: S <=0.25      -  Gentamicin: S <=1 Should not be used as monotherapy      -  Oxacillin: S 0.5 Oxacillin predicts susceptibility for dicloxacillin, methicillin, and nafcillin      -  Penicillin: R >8      -  Rifampin: S <=1 Should not be used as monotherapy      -  Tetracycline: S <=1      -  Trimethoprim/Sulfamethoxazole: S <=0.5/9.5      -  Vancomycin: S 1    Culture - Blood (collected 09-04-24 @ 12:08)  Source: .Blood Blood-Peripheral  Preliminary Report (09-08-24 @ 18:01):    No growth at 4 days    Culture - Blood (collected 09-04-24 @ 12:00)  Source: .Blood Blood-Peripheral  Preliminary Report (09-08-24 @ 18:01):    No growth at 4 days    RADIOLOGY:  imaging below personally reviewed and agree with findings    CT Head No Cont (09.04.24 @ 19:45)  IMPRESSION:  Mild chronic microvascular changes without evidence of an acute transcortical infarction or hemorrhage.    CT Abdomen and Pelvis w/ IV Cont (09.04.24 @ 13:37)  IMPRESSION:  *  Subcutaneous infiltration in the bilateral gluteal region and overlying the posterior aspect of the bony pelvis, which could reflect reported cellulitis. Focal area of cutaneous irregularity and small   amount of superficial soft tissue gas in the left gluteal region, which may represent the known decubitus ulcer. The gas could be due to open communication to the environment, versus an airforming infection. No drainable collection.  *  Cholelithiasis. Borderline common bile duct dilation. Hyperdensity in the distal common bile duct, which may represent choledocholithiasis or sludge. Consider further evaluation with abdominal MRI/MRCP.  *  Large rectal stool burden. Mild rectal wall thickening and perirectal fat infiltration, possibly reflecting stercoral proctitis.  *  Colonic diverticulosis. Wall thickening in the sigmoid colon, similar to the exam of 12/29/2023, potentially due to chronic diverticular disease or colitis. Focus of soft tissue density inseparable from the sigmoid colon measuring 3.2 x 2.1 cm, also seen on 12/29/2023; it is unclear if this is a tract of tissue related to colonic pathology, or a part of the left adnexa, possibly the left ovary. A neoplastic process is not entirely excluded.

## 2024-09-09 NOTE — PROGRESS NOTE ADULT - SUBJECTIVE AND OBJECTIVE BOX
[facility] Division of Hospital Medicine  Dafne Blair MD  Available via MS Teams      SUBJECTIVE / OVERNIGHT EVENTS: Overnight pt was constipated got enema and started MIRAlax.    ADDITIONAL REVIEW OF SYSTEMS: ROS reviewed and found to be negative    MEDICATIONS  (STANDING):  ascorbic acid 500 milliGRAM(s) Oral daily  chlorhexidine 2% Cloths 1 Application(s) Topical daily  enoxaparin Injectable 40 milliGRAM(s) SubCutaneous every 24 hours  levothyroxine 75 MICROGram(s) Oral daily  multivitamin 1 Tablet(s) Oral daily  mupirocin 2% Nasal 1 Application(s) Both Nostrils two times a day  piperacillin/tazobactam IVPB.. 3.375 Gram(s) IV Intermittent every 8 hours  polyethylene glycol 3350 17 Gram(s) Oral two times a day  risperiDONE   Tablet 0.125 milliGRAM(s) Oral daily  senna 2 Tablet(s) Oral at bedtime    MEDICATIONS  (PRN):  acetaminophen     Tablet .. 650 milliGRAM(s) Oral every 6 hours PRN Temp greater or equal to 38C (100.4F), Mild Pain (1 - 3)  aluminum hydroxide/magnesium hydroxide/simethicone Suspension 30 milliLiter(s) Oral every 4 hours PRN Dyspepsia  melatonin 3 milliGRAM(s) Oral at bedtime PRN Insomnia  ondansetron Injectable 4 milliGRAM(s) IV Push every 8 hours PRN Nausea and/or Vomiting      I&O's Summary    08 Sep 2024 07:01  -  09 Sep 2024 07:00  --------------------------------------------------------  IN: 220 mL / OUT: 200 mL / NET: 20 mL        PHYSICAL EXAM:  Vital Signs Last 24 Hrs  T(C): 36.4 (09 Sep 2024 11:00), Max: 36.9 (08 Sep 2024 20:57)  T(F): 97.5 (09 Sep 2024 11:00), Max: 98.4 (08 Sep 2024 20:57)  HR: 75 (09 Sep 2024 11:00) (75 - 97)  BP: 106/62 (09 Sep 2024 11:00) (100/53 - 106/62)  BP(mean): --  RR: 19 (09 Sep 2024 11:00) (19 - 20)  SpO2: 95% (09 Sep 2024 11:00) (95% - 99%)    Parameters below as of 09 Sep 2024 11:00  Patient On (Oxygen Delivery Method): room air      CONSTITUTIONAL: Frail, laying in bed  EYES: No conjunctival or scleral injection, non-icteric; PERRLA and symmetric  ENMT: Moist oral mucosa   RESPIRATORY: Breathing comfortably; lungs CTA without wheeze/rhonchi/rales  CARDIOVASCULAR: +S1S2, RRR, no M/G/R  GASTROINTESTINAL: Soft, no tenderness to palpation, +BS, no rebound/guarding  MUSCULOSKELETAL: no peripheral edema  SKIN: Patient declining current examination; but with reported deep sacral wounds with escharing  NEURO/PSYCH: A+O to self, location (able to tell writer she is in hospital), answering questions appropriately though [place] which is reportedly around baseline for pt    LABS:    09-09    141  |  107  |  6<L>  ----------------------------<  94  3.3<L>   |  22  |  0.56    Ca    8.8      09 Sep 2024 07:33  Mg     2.2     09-08            Urinalysis Basic - ( 09 Sep 2024 07:33 )    Color: x / Appearance: x / SG: x / pH: x  Gluc: 94 mg/dL / Ketone: x  / Bili: x / Urobili: x   Blood: x / Protein: x / Nitrite: x   Leuk Esterase: x / RBC: x / WBC x   Sq Epi: x / Non Sq Epi: x / Bacteria: x            RADIOLOGY & ADDITIONAL TESTS:  New Imaging Personally Reviewed Today:  New Electrocardiogram Personally Reviewed Today:  Other Results Reviewed Today:   Prior or Outpatient Records Reviewed Today with Summary:    COORDINATION OF CARE:  Consultant Communication and Details of Discussion (where applicable):

## 2024-09-09 NOTE — DISCHARGE NOTE NURSING/CASE MANAGEMENT/SOCIAL WORK - NSSCTYPOFSERV_GEN_ALL_CORE
for RN evaluation, Wound care ff up and care for RN evaluation, Wound care ff up and care- Please make sure you are given wound dressing supplies to take home with you

## 2024-09-09 NOTE — PROGRESS NOTE ADULT - ASSESSMENT
89F with HTN, Dementia (baseline AOx1-2), hypothyroidism who was hospitalized 12/2023 after a fall.  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  Was in rehab for 3 months and discharged April with house calls program.  Over the past month, she had decreased PO intake and developed b/l decubitus ulcers buttocks.  Constipation.  Has 24 hour care.  Niece noted patient to be unresponsive 9/4 and patient was brought to ED by EMS.  No fever.  No leukocytosis.  Normal differential.  Decubitus noted with erythema on the right buttock.  Started on vancomycin / zosyn.  ESR 70s.  CRP 30s.  CT without findings of bony erosion.    Buttock decubitus  - superficially infected  - no evidence of sepsis  - wound with MSSA, vancomycin discontinued  - D#6 IV zosyn, would stop after tomorrow  - wound care    Please call Infectious Diseases if there is a change in status.  Thank you.  [phone].

## 2024-09-09 NOTE — DISCHARGE NOTE NURSING/CASE MANAGEMENT/SOCIAL WORK - NSDCPEFALRISK_GEN_ALL_CORE
For information on Fall & Injury Prevention, visit: [email or web address] OR  [email or web address] OR  https://www.cdc.gov/steadi/patient.html

## 2024-09-09 NOTE — CHART NOTE - NSCHARTNOTEFT_GEN_A_CORE
MEDICINE NP    GURINDER MARIELENA  89y Female    Patient is a 89y old  Female who presents with a chief complaint of ams for 1 day (08 Sep 2024 14:02)       > Event Summary:  Notified by RN, Patient with pain, noted stool burden in rectum.   Patient seen at bedside, awake and moaning.  Abdomen soft, NTND, +BS.  No nausea or vomiting.   Patient s/p disimpaction for large amount of stool from rectal vault-tolerated well, no bleeding.    -Enema x1   -Senna @HS and Miralax BID resumed  -C/w Zosyn iv for Stercoral Proctitis   -Frequent abdominal exams  -Will endorse to Day Provider in AM and Attending to follow      -Vital Signs Last 24 Hrs  T(C): 36.7 (09 Sep 2024 04:50), Max: 36.9 (08 Sep 2024 20:57)  T(F): 98.1 (09 Sep 2024 04:50), Max: 98.4 (08 Sep 2024 20:57)  HR: 84 (09 Sep 2024 04:50) (84 - 97)  BP: 100/53 (09 Sep 2024 04:50) (100/53 - 103/59)  RR: 19 (09 Sep 2024 04:50) (18 - 20)  SpO2: 99% (09 Sep 2024 04:50) (97% - 100%)    Parameters below as of 09 Sep 2024 04:50  Patient On (Oxygen Delivery Method): room air      -CT Abdomen and Pelvis w/ IV Cont (09.04.24 @ 13:37) >  *  Large rectal stool burden, the rectum distended to approximately 9.0 cm.   No significant upstream bowel distention to suggest obstruction.   Mild rectal wall thickening and perirectal fat infiltration.   Mild to moderate stool burden elsewhere within the colon.   Mild rectal wall thickening and perirectal fat infiltration, possibly reflecting stercoral proctitis.  < end of copied text >      BUDDY García-BC  Medicine Department  #[id number]

## 2024-09-09 NOTE — DISCHARGE NOTE NURSING/CASE MANAGEMENT/SOCIAL WORK - PATIENT PORTAL LINK FT
You can access the FollowMyHealth Patient Portal offered by [facility] by registering at the following website: [email or web address]. By joining [employer]’s FollowMyHealth portal, you will also be able to view your health information using other applications (apps) compatible with our system.

## 2024-09-10 LAB
ANION GAP SERPL CALC-SCNC: 10 MMOL/L — SIGNIFICANT CHANGE UP (ref 5–17)
BUN SERPL-MCNC: 4 MG/DL — LOW (ref 7–23)
CALCIUM SERPL-MCNC: 8.9 MG/DL — SIGNIFICANT CHANGE UP (ref 8.4–10.5)
CHLORIDE SERPL-SCNC: 107 MMOL/L — SIGNIFICANT CHANGE UP (ref 96–108)
CO2 SERPL-SCNC: 22 MMOL/L — SIGNIFICANT CHANGE UP (ref 22–31)
CREAT SERPL-MCNC: 0.56 MG/DL — SIGNIFICANT CHANGE UP (ref 0.5–1.3)
CULTURE RESULTS: ABNORMAL
EGFR: 88 ML/MIN/1.73M2 — SIGNIFICANT CHANGE UP
GLUCOSE SERPL-MCNC: 88 MG/DL — SIGNIFICANT CHANGE UP (ref 70–99)
MAGNESIUM SERPL-MCNC: 2.2 MG/DL — SIGNIFICANT CHANGE UP (ref 1.6–2.6)
ORGANISM # SPEC MICROSCOPIC CNT: ABNORMAL
ORGANISM # SPEC MICROSCOPIC CNT: ABNORMAL
POTASSIUM SERPL-MCNC: 3.2 MMOL/L — LOW (ref 3.5–5.3)
POTASSIUM SERPL-SCNC: 3.2 MMOL/L — LOW (ref 3.5–5.3)
SODIUM SERPL-SCNC: 139 MMOL/L — SIGNIFICANT CHANGE UP (ref 135–145)
SPECIMEN SOURCE: SIGNIFICANT CHANGE UP

## 2024-09-10 PROCEDURE — 99232 SBSQ HOSP IP/OBS MODERATE 35: CPT

## 2024-09-10 RX ORDER — POTASSIUM CHLORIDE 10 MEQ
40 TABLET, EXT RELEASE, PARTICLES/CRYSTALS ORAL ONCE
Refills: 0 | Status: DISCONTINUED | OUTPATIENT
Start: 2024-09-10 | End: 2024-09-10

## 2024-09-10 RX ORDER — POTASSIUM CHLORIDE 10 MEQ
40 TABLET, EXT RELEASE, PARTICLES/CRYSTALS ORAL EVERY 4 HOURS
Refills: 0 | Status: DISCONTINUED | OUTPATIENT
Start: 2024-09-10 | End: 2024-09-10

## 2024-09-10 RX ORDER — POTASSIUM CHLORIDE 10 MEQ
10 TABLET, EXT RELEASE, PARTICLES/CRYSTALS ORAL
Refills: 0 | Status: COMPLETED | OUTPATIENT
Start: 2024-09-10 | End: 2024-09-10

## 2024-09-10 RX ADMIN — Medication 100 MILLIEQUIVALENT(S): at 12:22

## 2024-09-10 RX ADMIN — CHLORHEXIDINE GLUCONATE 1 APPLICATION(S): 40 SOLUTION TOPICAL at 12:25

## 2024-09-10 RX ADMIN — PIPERACILLIN SODIUM AND TAZOBACTAM SODIUM 25 GRAM(S): 3; .375 INJECTION, POWDER, FOR SOLUTION INTRAVENOUS at 09:44

## 2024-09-10 RX ADMIN — RISPERIDONE 0.12 MILLIGRAM(S): 0.25 TABLET, FILM COATED ORAL at 12:22

## 2024-09-10 RX ADMIN — Medication 1 APPLICATION(S): at 18:40

## 2024-09-10 RX ADMIN — Medication 100 MILLIEQUIVALENT(S): at 15:00

## 2024-09-10 RX ADMIN — PIPERACILLIN SODIUM AND TAZOBACTAM SODIUM 25 GRAM(S): 3; .375 INJECTION, POWDER, FOR SOLUTION INTRAVENOUS at 00:11

## 2024-09-10 RX ADMIN — Medication 2 TABLET(S): at 21:29

## 2024-09-10 RX ADMIN — Medication 500 MILLIGRAM(S): at 12:22

## 2024-09-10 RX ADMIN — POLYETHYLENE GLYCOL 3350 17 GRAM(S): 17 POWDER, FOR SOLUTION ORAL at 18:40

## 2024-09-10 RX ADMIN — Medication 1 TABLET(S): at 12:23

## 2024-09-10 RX ADMIN — PIPERACILLIN SODIUM AND TAZOBACTAM SODIUM 25 GRAM(S): 3; .375 INJECTION, POWDER, FOR SOLUTION INTRAVENOUS at 18:40

## 2024-09-10 RX ADMIN — ENOXAPARIN SODIUM 40 MILLIGRAM(S): 100 INJECTION SUBCUTANEOUS at 18:40

## 2024-09-10 RX ADMIN — Medication 100 MILLIEQUIVALENT(S): at 13:34

## 2024-09-10 NOTE — PROGRESS NOTE ADULT - PROBLEM SELECTOR PLAN 3
On CT abdomen- cholelithiases with hyperdensity in distal cbd (?choledocholithiasis or sludge).   -confirmed with HCP niece and wants to hold of on MRCP and see if patient improves with IV abx, wanting to limit invasive interventions.  - senna hs, Miralax BID, bisacodyl and rectal suppository for Large rectal stool with sterocoal proctitis burden seen on CT - if no BM, may require enema  - S/p BM 9/7    #R/o colon neoplasm  - Focus of soft tissue density inseparable from the sigmoid colon measuring 3.2 x 2.1 cm, also seen on 12/29/202  -d/w niece of findings and does want to pursue additional testing including colonoscopy to rule of malignancy
On CT abdomen- cholelithiases with hyperdensity in distal cbd (?choledocholithiasis or sludge).   -confirmed with HCP niece and wants to hold of on MRCP and see if patient improves with IV abx, wanting to limit invasive interventions.  - senna hs, Miralax BID, bisacodyl and rectal suppository for Large rectal stool with sterocoal proctitis burden seen on CT - if no BM, may require enema  - With multiple BM 9/7-9/8  - enema 9/9    #R/o colon neoplasm  - Focus of soft tissue density inseparable from the sigmoid colon measuring 3.2 x 2.1 cm, also seen on 12/29/202  -d/w niece of findings and does want to pursue additional testing including colonoscopy to rule of malignancy
On CT abdomen- cholelithiases with hyperdensity in distal cbd (?choledocholithiasis or sludge).   -confirmed with HCP niece and wants to hold of on MRCP and see if patient improves with IV abx, wanting to limit invasive interventions.  - senna hs, Miralax BID, bisacodyl and rectal suppository for Large rectal stool with sterocoal proctitis burden seen on CT - if no BM, may require enema  - With multiple BM 9/7-9/8  - enema 9/9    #R/o colon neoplasm  - Focus of soft tissue density inseparable from the sigmoid colon measuring 3.2 x 2.1 cm, also seen on 12/29/202  -d/w niece of findings and does want to pursue additional testing including colonoscopy to rule of malignancy
On CT abdomen- cholelithiases with hyperdensity in distal cbd (?choledocholithiasis or sludge).   -confirmed with HCP niece and wants to hold of on MRCP and see if patient improves with IV abx, wanting to limit invasive interventions.  - senna hs, Miralax BID and rectal suppository for Large rectal stool with sterocoal proctitis burden seen on CT .    #R/o colon neoplasm  - Focus of soft tissue density inseparable from the sigmoid colon measuring 3.2 x 2.1 cm, also seen on 12/29/202  -d/w niece of findings and does want to pursue additional testing including colonoscopy to rule of malignancy
On CT abdomen- cholelithiases with hyperdensity in distal cbd (?choledocholithiasis or sludge).   -confirmed with HCP niece and wants to hold of on MRCP and see if patient improves with IV abx, wanting to limit invasive interventions.  - senna hs, Miralax BID, bisacodyl and rectal suppository for Large rectal stool with sterocoal proctitis burden seen on CT - if no BM, may require enema  - With multiple BM 9/7-9/8    #R/o colon neoplasm  - Focus of soft tissue density inseparable from the sigmoid colon measuring 3.2 x 2.1 cm, also seen on 12/29/202  -d/w niece of findings and does want to pursue additional testing including colonoscopy to rule of malignancy
On CT abdomen- cholelithiases with hyperdensity in distal cbd (?choledocholithiasis or sludge).   -confirmed with HCP niece and wants to hold of on MRCP and see if patient improves with IV abx, wanting to limit invasive interventions.  - senna hs, Miralax BID, bisacodyl and rectal suppository for Large rectal stool with sterocoal proctitis burden seen on CT - if no BM, may require enema    #R/o colon neoplasm  - Focus of soft tissue density inseparable from the sigmoid colon measuring 3.2 x 2.1 cm, also seen on 12/29/202  -d/w niece of findings and does want to pursue additional testing including colonoscopy to rule of malignancy
Home

## 2024-09-10 NOTE — PROGRESS NOTE ADULT - PROBLEM SELECTOR PLAN 7
dvt ppx: Lovenox  diet: soft and bite sized, protein supplementation as per RD  dispo: pending final ID recs, re: abx duration  GOC: DNR/DNI Molst in chart. HCP Julio Kerline  Discussed with JOSE Bonilla
dvt ppx: Lovenox  diet: soft and bite sized, protein supplementation as per RD  dispo: pending final ID recs, re: abx duration  GOC: DNR/DNI Molst in chart. HCP Nisarah Churchill, updated via phone 9/8  Discussed with ACP Chari
dvt ppx: Lovenox  diet: soft and bite sized, protein supplementation as per RD  dispo: plan for Dc tomorrow  GOC: DNR/DNI Molst in chart. HCP Julio Churchill, updated via phone 9/9  Discussed with ACP Chari
dvt ppx: Lovenox  diet: soft and bite sized, protein supplementation as per RD  dispo: plan for Dc tomorrow  GOC: DNR/DNI Molst in chart. HCP Julio Churchill, updated via phone 9/10  P4D 9/11  Discussed with ACP Chari

## 2024-09-10 NOTE — PROGRESS NOTE ADULT - SUBJECTIVE AND OBJECTIVE BOX
[facility] Division of Hospital Medicine  Dafne Blair MD  Available via MS Teams      SUBJECTIVE / OVERNIGHT EVENTS: No acute events overnight. Pt with bowel movement this morning.    ADDITIONAL REVIEW OF SYSTEMS: Pt declines to answer    MEDICATIONS  (STANDING):  ascorbic acid 500 milliGRAM(s) Oral daily  chlorhexidine 2% Cloths 1 Application(s) Topical daily  enoxaparin Injectable 40 milliGRAM(s) SubCutaneous every 24 hours  levothyroxine 75 MICROGram(s) Oral daily  multivitamin 1 Tablet(s) Oral daily  mupirocin 2% Nasal 1 Application(s) Both Nostrils two times a day  piperacillin/tazobactam IVPB.. 3.375 Gram(s) IV Intermittent every 8 hours  polyethylene glycol 3350 17 Gram(s) Oral two times a day  potassium chloride  10 mEq/100 mL IVPB 10 milliEquivalent(s) IV Intermittent every 1 hour  risperiDONE   Tablet 0.125 milliGRAM(s) Oral daily  senna 2 Tablet(s) Oral at bedtime    MEDICATIONS  (PRN):  acetaminophen     Tablet .. 650 milliGRAM(s) Oral every 6 hours PRN Temp greater or equal to 38C (100.4F), Mild Pain (1 - 3)  aluminum hydroxide/magnesium hydroxide/simethicone Suspension 30 milliLiter(s) Oral every 4 hours PRN Dyspepsia  melatonin 3 milliGRAM(s) Oral at bedtime PRN Insomnia  ondansetron Injectable 4 milliGRAM(s) IV Push every 8 hours PRN Nausea and/or Vomiting      I&O's Summary    09 Sep 2024 07:01  -  10 Sep 2024 07:00  --------------------------------------------------------  IN: 100 mL / OUT: 550 mL / NET: -450 mL        PHYSICAL EXAM:  Vital Signs Last 24 Hrs  T(C): 36.6 (10 Sep 2024 11:02), Max: 36.6 (09 Sep 2024 18:30)  T(F): 97.8 (10 Sep 2024 11:02), Max: 97.9 (09 Sep 2024 18:30)  HR: 81 (10 Sep 2024 11:02) (81 - 85)  BP: 97/54 (10 Sep 2024 11:02) (97/54 - 111/67)  BP(mean): --  RR: 19 (10 Sep 2024 11:02) (19 - 19)  SpO2: 98% (10 Sep 2024 11:02) (96% - 98%)    Parameters below as of 10 Sep 2024 11:02  Patient On (Oxygen Delivery Method): room air      CONSTITUTIONAL: Frail, laying in bed  EYES: No conjunctival or scleral injection, non-icteric; PERRLA and symmetric  ENMT: Moist oral mucosa   RESPIRATORY: Breathing comfortably; lungs CTA without wheeze/rhonchi/rales  CARDIOVASCULAR: +S1S2, RRR, no M/G/R  GASTROINTESTINAL: Soft, no tenderness to palpation, +BS, no rebound/guarding  MUSCULOSKELETAL: no peripheral edema  SKIN: Patient declining current examination; but with reported deep sacral wounds with escharing  NEURO/PSYCH: A+O to self, location (able to tell writer she is in hospital), answering questions appropriately though [place] which is reportedly around baseline for pt    LABS:    09-10    139  |  107  |  4<L>  ----------------------------<  88  3.2<L>   |  22  |  0.56    Ca    8.9      10 Sep 2024 11:14  Mg     2.2     09-10            Urinalysis Basic - ( 10 Sep 2024 11:14 )    Color: x / Appearance: x / SG: x / pH: x  Gluc: 88 mg/dL / Ketone: x  / Bili: x / Urobili: x   Blood: x / Protein: x / Nitrite: x   Leuk Esterase: x / RBC: x / WBC x   Sq Epi: x / Non Sq Epi: x / Bacteria: x            RADIOLOGY & ADDITIONAL TESTS:  New Imaging Personally Reviewed Today:  New Electrocardiogram Personally Reviewed Today:  Other Results Reviewed Today:   Prior or Outpatient Records Reviewed Today with Summary:    COORDINATION OF CARE:  Consultant Communication and Details of Discussion (where applicable):

## 2024-09-10 NOTE — PROGRESS NOTE ADULT - PROBLEM SELECTOR PLAN 5
-bedbound, AOx1-2  -cw home risperidone
-TSH WNL  - C/w home synthroid
-TSH WNL  - C/w home synthroid
-bedbound, AOx1-2  -cw home risperidone
-TSH WNL  - C/w home synthroid
cw home synthroid  -TSH WNL

## 2024-09-10 NOTE — PROGRESS NOTE ADULT - PROBLEM SELECTOR PLAN 1
Baseline AOx1-2, conversant with family. On the morning of 9/4, patient more lethargic and slurred speech. AMS 2/2 infection, lower suspicion for stroke. Outside time window for tpa. No focal neurologic deficits  -likely metabolic, in setting   -CTH negative for acute intracranial pathology, mentation improved to baseline per neice 9/5  - Blood cx with NGTD  - Receiving abx (vanc - no hx of allergy), zosyn
Baseline AOx1-2, conversant with family. On the morning of 9/4, patient more lethargic and slurred speech. AMS 2/2 infection, lower suspicion for stroke. Outside time window for tpa. No focal neurologic deficits  -CTH negative for acute intracranial pathology, mentation improved to baseline 9/5  - Blood cx pending  - Receiving abx (vanc - no hx of allergy), zosyn
Baseline AOx1-2, conversant with family. On the morning of 9/4, patient more lethargic and slurred speech. AMS 2/2 infection, lower suspicion for stroke. Outside time window for tpa. No focal neurologic deficits  - likely metabolic, in setting of sacral wound/cellulitis   - CTH negative for acute intracranial pathology, mentation improved to baseline day after admission  - Blood cx with NGTD  - Receiving abx (vanc - no hx of allergy), zosyn last day 9/10
Baseline AOx1-2, conversant with family. On the morning of 9/4, patient more lethargic and slurred speech. AMS 2/2 infection, lower suspicion for stroke. Outside time window for tpa. No focal neurologic deficits. Improved.  - likely metabolic, in setting of sacral wound/cellulitis   - CTH negative for acute intracranial pathology, mentation improved to baseline day after admission  - Blood cx with NGTD  - Receiving abx (vanc - no hx of allergy), zosyn last day 9/10
Baseline AOx1-2, conversant with family. On the morning of 9/4, patient more lethargic and slurred speech. AMS 2/2 infection, lower suspicion for stroke. Outside time window for tpa. No focal neurologic deficits  - likely metabolic, in setting of sacral wound/cellulitis   - CTH negative for acute intracranial pathology, mentation improved to baseline day after admission  - Blood cx with NGTD  - Receiving abx (vanc - no hx of allergy), zosyn
Baseline AOx1-2, conversant with family. On the morning of 9/4, patient more lethargic and slurred speech. AMS 2/2 infection, lower suspicion for stroke. Outside time window for tpa. No focal neurologic deficits  -CTH negative for acute intracranial pathology, mentation improved to baseline 9/5  - Blood cx pending  - Receiving abx (vanc - no hx of allergy), zosyn

## 2024-09-10 NOTE — PROGRESS NOTE ADULT - PROBLEM SELECTOR PLAN 2
Large deep sacral wound with greenish discharge and foul smelling. Eschar sounding sacral wound  -CT ab- Subq infiltration in the bilateral gluteal region and overlying the posterior aspect of the bony pelvis likely cellulitis. +decubitus ulcer.  -wound care consult appreciated  -blood cx with NGTD, Staph PCR positive   -c/w vanc 500 mg IV daily (trough level 30 min prior to 3rd dose) and zosyn for now end date 9/10  - wound care recs Group 2 mattress on hospital bed and ROHO cushion for wheel chair upon discharge home- pt's niece has the matress topper  -MRSA PCR positive, wound culture also growing S. aureus  - appreciate ID recs
Large deep sacral wound with greenish discharge and foul smelling. Eschar sounding sacral wound  -CT ab- Subq infiltration in the bilateral gluteal region and overlying the posterior aspect of the bony pelvis likely cellulitis. +decubitus ulcer.  -wound care consult appreciated  -blood cx with NGTD, Staph PCR positive   -c/w vanc 500 mg IV daily (trough level 30 min prior to 3rd dose) and zosyn for now end date 9/9  -MRSA PCR positive, wound culture also growing S. aureus  - appreciate ID recs
Large deep sacral wound with greenish discharge and foul smelling. Eschar sounding sacral wound  -CT ab- Subq infiltration in the bilateral gluteal region and overlying the posterior aspect of the bony pelvis likely cellulitis. +decubitus ulcer.  -wound care consult  -blood cx with NGTD, Staph PCR positive   -appreciate ID recs, c/w vanc 500 mg IV daily and zosyn for now, d/c vanc if MRSA PCR negative  -wound culture pending (if MRSA neg, can stop vanc)
Large deep sacral wound with greenish discharge and foul smelling. Eschar sounding sacral wound  -CT ab- Subq infiltration in the bilateral gluteal region and overlying the posterior aspect of the bony pelvis likely cellulitis. +decubitus ulcer.  -wound care consult appreciated  -blood cx with NGTD, Staph PCR positive   -c/w vanc 500 mg IV daily (trough level 30 min prior to 3rd dose) and zosyn for now  -MRSA PCR positive, wound culture also growing S. aureus  -Pending final ID recs
Large deep sacral wound with greenish discharge and foul smelling. Eschar sounding sacral wound  -CT ab- Subq infiltration in the bilateral gluteal region and overlying the posterior aspect of the bony pelvis likely cellulitis. +decubitus ulcer.  -wound culture pending  -wound care consult  -blood cx pending, MRSA PCR pending  -appreciate ID recs, c/w vanc 500 mg IV daily and zosyn for now, d/c vanc if MRSA PCR negative
Large deep sacral wound with greenish discharge and foul smelling. Eschar sounding sacral wound  -CT ab- Subq infiltration in the bilateral gluteal region and overlying the posterior aspect of the bony pelvis likely cellulitis. +decubitus ulcer.  -wound care consult  -blood cx with NGTD, Staph PCR positive   -c/w vanc 500 mg IV daily (trough level 30 min prior to 3rd dose) and zosyn for now  -MRSA PCR positive, wound culture also growing S. aureus  -Pending final ID recs

## 2024-09-10 NOTE — PROGRESS NOTE ADULT - PROBLEM SELECTOR PLAN 4
- K of 2.9 today, likely in context of poor PO intake  - Repleted, will continue to monitor
- Likely in setting of diminished PO intake  - Repleted, will continue to monitor
cw home synthroid  -TSH pending
cw home synthroid  -TSH pending

## 2024-09-11 ENCOUNTER — TRANSCRIPTION ENCOUNTER (OUTPATIENT)
Age: 89
End: 2024-09-11

## 2024-09-11 ENCOUNTER — NON-APPOINTMENT (OUTPATIENT)
Age: 89
End: 2024-09-11

## 2024-09-11 VITALS
DIASTOLIC BLOOD PRESSURE: 72 MMHG | TEMPERATURE: 98 F | HEART RATE: 82 BPM | OXYGEN SATURATION: 98 % | RESPIRATION RATE: 18 BRPM | SYSTOLIC BLOOD PRESSURE: 120 MMHG

## 2024-09-11 LAB
ANION GAP SERPL CALC-SCNC: 11 MMOL/L — SIGNIFICANT CHANGE UP (ref 5–17)
BUN SERPL-MCNC: 4 MG/DL — LOW (ref 7–23)
CALCIUM SERPL-MCNC: 8.7 MG/DL — SIGNIFICANT CHANGE UP (ref 8.4–10.5)
CHLORIDE SERPL-SCNC: 110 MMOL/L — HIGH (ref 96–108)
CO2 SERPL-SCNC: 20 MMOL/L — LOW (ref 22–31)
CREAT SERPL-MCNC: 0.46 MG/DL — LOW (ref 0.5–1.3)
EGFR: 92 ML/MIN/1.73M2 — SIGNIFICANT CHANGE UP
GLUCOSE SERPL-MCNC: 95 MG/DL — SIGNIFICANT CHANGE UP (ref 70–99)
POTASSIUM SERPL-MCNC: 3.2 MMOL/L — LOW (ref 3.5–5.3)
POTASSIUM SERPL-MCNC: 4.3 MMOL/L — SIGNIFICANT CHANGE UP (ref 3.5–5.3)
POTASSIUM SERPL-SCNC: 3.2 MMOL/L — LOW (ref 3.5–5.3)
POTASSIUM SERPL-SCNC: 4.3 MMOL/L — SIGNIFICANT CHANGE UP (ref 3.5–5.3)
SODIUM SERPL-SCNC: 141 MMOL/L — SIGNIFICANT CHANGE UP (ref 135–145)

## 2024-09-11 PROCEDURE — 99232 SBSQ HOSP IP/OBS MODERATE 35: CPT

## 2024-09-11 PROCEDURE — 99239 HOSP IP/OBS DSCHRG MGMT >30: CPT

## 2024-09-11 RX ORDER — POTASSIUM CHLORIDE 10 MEQ
20 TABLET, EXT RELEASE, PARTICLES/CRYSTALS ORAL
Refills: 0 | Status: COMPLETED | OUTPATIENT
Start: 2024-09-11 | End: 2024-09-11

## 2024-09-11 RX ORDER — ASCORBIC ACID/ASCORBATE SODIUM 500 MG
1 TABLET,CHEWABLE ORAL
Qty: 0 | Refills: 0 | DISCHARGE
Start: 2024-09-11

## 2024-09-11 RX ADMIN — RISPERIDONE 0.12 MILLIGRAM(S): 0.25 TABLET, FILM COATED ORAL at 13:09

## 2024-09-11 RX ADMIN — Medication 1 TABLET(S): at 13:09

## 2024-09-11 RX ADMIN — Medication 50 MILLIEQUIVALENT(S): at 11:00

## 2024-09-11 RX ADMIN — POLYETHYLENE GLYCOL 3350 17 GRAM(S): 17 POWDER, FOR SOLUTION ORAL at 05:05

## 2024-09-11 RX ADMIN — Medication 500 MILLIGRAM(S): at 13:09

## 2024-09-11 RX ADMIN — CHLORHEXIDINE GLUCONATE 1 APPLICATION(S): 40 SOLUTION TOPICAL at 13:18

## 2024-09-11 RX ADMIN — Medication 50 MILLIEQUIVALENT(S): at 13:28

## 2024-09-11 RX ADMIN — Medication 1 APPLICATION(S): at 05:05

## 2024-09-11 RX ADMIN — PIPERACILLIN SODIUM AND TAZOBACTAM SODIUM 25 GRAM(S): 3; .375 INJECTION, POWDER, FOR SOLUTION INTRAVENOUS at 02:00

## 2024-09-11 RX ADMIN — Medication 75 MICROGRAM(S): at 05:05

## 2024-09-11 NOTE — PROGRESS NOTE ADULT - REASON FOR ADMISSION
ams for 1 day

## 2024-09-11 NOTE — PROGRESS NOTE ADULT - SUBJECTIVE AND OBJECTIVE BOX
[facility]-- WOUND TEAM -- FOLLOW UP NOTE  --------------------------------------------------------------------------------    24 hour events/subjective:    alert  afebrile  incontinent  no odor or excess drainage or pain  tolerating dressing changes    Diet: Soft and Bite Sized    ROS: pt unable to offer    ALLERGIES & MEDICATIONS  --------------------------------------------------------------------------------  No Known Allergies      STANDING INPATIENT MEDICATIONS  ascorbic acid 500 milliGRAM(s) Oral daily  chlorhexidine 2% Cloths 1 Application(s) Topical daily  enoxaparin Injectable 40 milliGRAM(s) SubCutaneous every 24 hours  levothyroxine 75 MICROGram(s) Oral daily  multivitamin 1 Tablet(s) Oral daily  piperacillin/tazobactam IVPB 3.375 Gram(s) IV Intermittent every 8 hours  polyethylene glycol 3350 17 Gram(s) Oral two times a day  potassium chloride 20 mEq/100 mL IVPB 20 milliEquivalent(s) IV Intermittent every 2 hours  risperiDONE Tablet 0.125 milliGRAM(s) Oral daily  senna 2 Tablet(s) Oral at bedtime      PRN INPATIENT MEDICATION  acetaminophen Tablet 650 milliGRAM(s) Oral every 6 hours PRN  aluminum hydroxide/magnesium hydroxide/simethicone Suspension 30 milliLiter(s) Oral every 4 hours PRN  melatonin 3 milliGRAM(s) Oral at bedtime PRN  ondansetron Injectable 4 milliGRAM(s) IV Push every 8 hours PRN        VITALS/PHYSICAL EXAM  --------------------------------------------------------------------------------  T(C): 36.4 (09-11-24 @ 12:02), Max: 37 (09-10-24 @ 19:22)  HR: 72 (09-11-24 @ 12:02) (72 - 81)  BP: 113/69 (09-11-24 @ 12:02) (101/55 - 120/70)  RR: 18 (09-11-24 @ 12:02) (18 - 18)  SpO2: 97% (09-11-24 @ 12:02) (96% - 97%)  Wt(kg): --      NAD,  Alert/ Confused/ thin/ frail  Versa Care P500   HEENT:   sclera clear, mucosa moist, throat clear, trachea midline, neck supple  Respiratory: nonlabored w/ equal chest rise  Gastrointestinal: soft NT/ND   : (+)purewick  Neurology: verbal, follows simple commands  Psych: easily agitated  Musculoskeletal:  no deformities/ contractures  Vascular: BLE equally warm, no cyanosis, clubbing, edema nor acute ischemia  Skin:  thin, dry, pale, frail  Rt buttock into sacrum unstageable pressure injury 4.0cm x 8.0cm x 0.2cm w/ yellow slough and moist granular tissue  serosanguinous drainage, periwound with maroon/ erythema   no odor, no increased warmth, induration, fluctuance, nor crepitus             LABS/ CULTURES/ RADIOLOGY:    141  |  110  |  4   ----------------------------<  95      [09-11-24 @ 07:08]  3.2   |  20  |  0.46        Ca     8.7     [09-11-24 @ 07:08]      Mg     2.2     [09-10-24 @ 11:14]

## 2024-09-11 NOTE — DISCHARGE NOTE PROVIDER - CARE PROVIDER_API CALL
Lissy Jimenez  [address], [address]  [place] [address]  Phone: [phone]  Fax: [phone]  Follow Up Time: 1 week

## 2024-09-11 NOTE — DISCHARGE NOTE PROVIDER - NSDCMRMEDTOKEN_GEN_ALL_CORE_FT
Advil 200 mg oral tablet: 1 tab(s) orally once a day as needed for pain  cholecalciferol 1250 mcg (50,000 intl units) oral capsule: 1 cap(s) orally once a week (Every Tuesday)  risperiDONE 0.25 mg oral tablet: 0.5 tab(s) orally once a day  Synthroid 75 mcg (0.075 mg) oral tablet: 1 tab(s) orally once a day  traMADol 50 mg oral tablet: 1 tab(s) orally once a day (at bedtime) as needed for pain   Advil 200 mg oral tablet: 1 tab(s) orally once a day as needed for pain  ascorbic acid 500 mg oral tablet: 1 tab(s) orally once a day  cholecalciferol 1250 mcg (50,000 intl units) oral capsule: 1 cap(s) orally once a week (Every Tuesday)  Multiple Vitamins oral tablet: 1 tab(s) orally once a day  risperiDONE 0.25 mg oral tablet: 0.5 tab(s) orally once a day  Synthroid 75 mcg (0.075 mg) oral tablet: 1 tab(s) orally once a day  traMADol 50 mg oral tablet: 1 tab(s) orally once a day (at bedtime) as needed for pain

## 2024-09-11 NOTE — DISCHARGE NOTE PROVIDER - NSDCFUADDAPPT_GEN_ALL_CORE_FT
APPTS ARE READY TO BE MADE: [x] YES    Best Family or Patient Contact (if needed):    Additional Information about above appointments (if needed):    1: [facility] [address] [place] [address] [phone] in 1-2 weeks  2:   3:     Other comments or requests:    APPTS ARE READY TO BE MADE: [x] YES    Best Family or Patient Contact (if needed):    Additional Information about above appointments (if needed):    1: [facility] [address] [place] [address] [phone] in 1-2 weeks  2: Dr. Jimenez  3:     Other comments or requests:    APPTS ARE READY TO BE MADE: [x] YES    Best Family or Patient Contact (if needed):    Additional Information about above appointments (if needed):    1: [facility] [address] [place] [address] [phone] in 1-2 weeks  2: Dr. Jimenez  3:     Other comments or requests:   Patient informed us they already have secured a follow up appointment which is not visible on [facility]. With at home care

## 2024-09-11 NOTE — DISCHARGE NOTE PROVIDER - HOSPITAL COURSE
HPI:   89yof pmhx of HTN Dementia (baseline AOx1-2) bed bound, hypothyroidism, brought in by EMS for AMS. Neice at bedside provided colleratal. Patient was getting treated for her sacral wound with PO amoxicillin since 8/29. She was her usual self yesterday prior to bed. This morning, the aides noted she was slow to respond and not talking to family. Daughter noticed slurring of her speech since this morning but has not notice any weakness on either side of her body. Aides/daughter called PCP who advised to take he to the ER for IV abx. Daughter confirms DNR/DNI with minimal invasive interventions but agreeable for IV abx. Patient is bedbound at baseline and has 24hour aides. She does have ep of agitation at home.     In the ED, vitals stable. Given zosyn.  (04 Sep 2024 17:49)    Hospital Course:  Admitted with Encephalopathy 2/2 infection in the setting of sacral ulcer, lower suspicion for stroke. Outside time window for tpa. No focal neurologic deficits. Improved.   CTH negative for acute intracranial pathology, mentation improved to baseline day after admission. Pt with large deep sacral wound with greenish discharge and foul smelling. Eschar sounding sacral wound. CT ab- Subq infiltration in the bilateral gluteal region and overlying the posterior aspect of the bony pelvis likely cellulitis. +decubitus ulcer. wound care consult appreciated.  blood cx with NGTD, MSSA  PCR positive . wound culture also growing S. aureus. ID recs appreciated.  Received course of 7 days abx  zosyn. vanco discontinued per ID        Incidental finding of  Cholelithiasis with hyperdensity in distal cbd (?choledocholithiasis or sludge). onfirmed with HCP niece and wants to hold of on MRCP and see if patient improves with IV abx, wanting to limit invasive interventions. cont laxatives for Large rectal stool with sterocoal proctitis burden seen on CT - s/p  enema 9/9. now with multiple bowel movements. ALso noted Focus of soft tissue density inseparable from the sigmoid colon measuring 3.2 x 2.1 cm, also seen on 12/29/202d/w niece of findings and does want to pursue additional testing including colonoscopy to rule of malignancy.    Mental status back to baseline. Medically cleared for discharge HOme with [facility]    Important Medication Changes and Reason:    Active or Pending Issues Requiring Follow-up:  > wound care follow up for sacral ulcer  Advanced Directives:   [ ] Full code  [ x] DNR  [ ] Hospice    Discharge Diagnoses:  Encephalopathy   scarl decubitus with cellulitis  stercoral colitis  cholelithaises  HTN   Dementia (baseline AOx1-2)  bed bound  hypothyroidism,        HPI:   89yof pmhx of HTN Dementia (baseline AOx1-2) bed bound, hypothyroidism, brought in by EMS for AMS. Neice at bedside provided colleratal. Patient was getting treated for her sacral wound with PO amoxicillin since 8/29. She was her usual self yesterday prior to bed. This morning, the aides noted she was slow to respond and not talking to family. Daughter noticed slurring of her speech since this morning but has not notice any weakness on either side of her body. Aides/daughter called PCP who advised to take he to the ER for IV abx. Daughter confirms DNR/DNI with minimal invasive interventions but agreeable for IV abx. Patient is bedbound at baseline and has 24hour aides. She does have ep of agitation at home.     In the ED, vitals stable. Given zosyn.  (04 Sep 2024 17:49)    Hospital Course:  Admitted with Encephalopathy 2/2 infection in the setting of sacral ulcer, lower suspicion for stroke. Outside time window for tpa. No focal neurologic deficits. Improved.   CTH negative for acute intracranial pathology, mentation improved to baseline day after admission. Pt with large deep sacral wound with greenish discharge and foul smelling. Eschar sounding sacral wound. CT ab- Subq infiltration in the bilateral gluteal region and overlying the posterior aspect of the bony pelvis likely cellulitis. +decubitus ulcer. wound care consult appreciated.  blood cx with NGTD, MSSA  PCR positive . wound culture also growing S. aureus. ID recs appreciated.  Received course of 7 days abx  zosyn. vanco discontinued per ID      Incidental finding of  Cholelithiasis with hyperdensity in distal cbd (?choledocholithiasis or sludge). Confirmed with HCP niece and wants to hold of on MRCP and see if patient improves with IV abx, wanting to limit invasive interventions. Continued on laxatives for Large rectal stool with stercoral proctitis burden seen on CT - s/p  enema 9/9. now with multiple bowel movements. ALso noted Focus of soft tissue density inseparable from the sigmoid colon measuring 3.2 x 2.1 cm, also seen on 12/29/202d/w niece of findings and does want to pursue additional testing including colonoscopy to rule of malignancy.    Mental status back to baseline. Medically cleared for discharge Home with HHA    Important Medication Changes and Reason:    Active or Pending Issues Requiring Follow-up:  > wound care follow up for sacral ulcer      Advanced Directives:   [ ] Full code  [ x] DNR  [ ] Hospice    Discharge Diagnoses:  Encephalopathy   scarl decubitus with cellulitis  stercoral colitis  cholelithaises  HTN   Dementia (baseline AOx1-2)  bed bound  hypothyroidism,        HPI:   89yof pmhx of HTN Dementia (baseline AOx1-2) bed bound, hypothyroidism, brought in by EMS for AMS. Neice at bedside provided colleratal. Patient was getting treated for her sacral wound with PO amoxicillin since 8/29. She was her usual self yesterday prior to bed. This morning, the aides noted she was slow to respond and not talking to family. Daughter noticed slurring of her speech since this morning but has not notice any weakness on either side of her body. Aides/daughter called PCP who advised to take he to the ER for IV abx. Daughter confirms DNR/DNI with minimal invasive interventions but agreeable for IV abx. Patient is bedbound at baseline and has 24hour aides. She does have ep of agitation at home.     In the ED, vitals stable. Given zosyn.  (04 Sep 2024 17:49)    Hospital Course:  Admitted with Encephalopathy 2/2 infection in the setting of sacral ulcer, lower suspicion for stroke. Outside time window for tpa. No focal neurologic deficits. Improved.   CTH negative for acute intracranial pathology, mentation improved to baseline day after admission. Pt with large deep sacral wound with greenish discharge and foul smelling. Eschar sounding sacral wound. CT ab- Subq infiltration in the bilateral gluteal region and overlying the posterior aspect of the bony pelvis likely cellulitis. +decubitus ulcer. wound care consult appreciated.  blood cx with NGTD, MSSA  PCR positive . wound culture also growing S. aureus. ID recs appreciated.  Received course of 7 days abx  zosyn. vanco discontinued per ID      Incidental finding of  Cholelithiasis with hyperdensity in distal cbd (?choledocholithiasis or sludge). Confirmed with HCP niece and wants to hold of on MRCP and see if patient improves with IV abx, wanting to limit invasive interventions. Continued on laxatives for Large rectal stool with stercoral proctitis burden seen on CT - s/p  enema 9/9. now with multiple bowel movements. Also noted Focus of soft tissue density inseparable from the sigmoid colon measuring 3.2 x 2.1 cm, also seen on 12/29/202d/w niece of findings and does want to pursue additional testing including colonoscopy to rule of malignancy which can be done OP    Mental status back to baseline. Medically cleared for discharge Home with HHA    Important Medication Changes and Reason:  None    Active or Pending Issues Requiring Follow-up:  > wound care follow up for sacral ulcer  > PCP for soft tissue density in Colon      Advanced Directives:   [ ] Full code  [ x] DNR  [ ] Hospice    Discharge Diagnoses:  Encephalopathy 2/2 sacral decubitus with cellulitis  stercoral colitis, with disimpaction  cholelithiases  HTN   Dementia (baseline AOx1-2)  bed bound  hypothyroidism,        HPI:   89yof pmhx of HTN Dementia (baseline AOx1-2) bed bound, hypothyroidism, brought in by EMS for AMS. Neice at bedside provided colleratal. Patient was getting treated for her sacral wound with PO amoxicillin since 8/29. She was her usual self yesterday prior to bed. This morning, the aides noted she was slow to respond and not talking to family. Daughter noticed slurring of her speech since this morning but has not notice any weakness on either side of her body. Aides/daughter called PCP who advised to take he to the ER for IV abx. Daughter confirms DNR/DNI with minimal invasive interventions but agreeable for IV abx. Patient is bedbound at baseline and has 24hour aides. She does have ep of agitation at home.     In the ED, vitals stable. Given zosyn.  (04 Sep 2024 17:49)    Hospital Course:  Admitted with Encephalopathy 2/2 infection in the setting of sacral ulcer, lower suspicion for stroke. Outside time window for tpa. No focal neurologic deficits. Improved.   CTH negative for acute intracranial pathology, mentation improved to baseline day after admission. Pt with large deep sacral wound with greenish discharge and foul smelling. Eschar sounding sacral wound. CT ab- Subq infiltration in the bilateral gluteal region and overlying the posterior aspect of the bony pelvis likely cellulitis. +decubitus ulcer. wound care consult appreciated.  blood cx with NGTD, MSSA  PCR positive . wound culture also growing S. aureus. ID recs appreciated.  Received course of 7 days abx  zosyn. vanco discontinued per ID      Incidental finding of  Cholelithiasis with hyperdensity in distal cbd (?choledocholithiasis or sludge). Confirmed with HCP niece and wants to hold of on MRCP and see if patient improves with IV abx, wanting to limit invasive interventions. Continued on laxatives for Large rectal stool with stercoral proctitis burden seen on CT - s/p  enema 9/9. now with multiple bowel movements. Also noted Focus of soft tissue density inseparable from the sigmoid colon measuring 3.2 x 2.1 cm, also seen on 12/29/202d/w niece of findings and does want to pursue additional testing including colonoscopy to rule of malignancy which can be done OP    Mental status back to baseline. Medically cleared for discharge Home with HHA    Important Medication Changes and Reason:  None    Active or Pending Issues Requiring Follow-up:  > wound care follow up for sacral ulcer  > PCP for soft tissue density in Colon      Advanced Directives:   [ ] Full code  [ x] DNR  [ ] Hospice    Discharge Diagnoses:  Encephalopathy 2/2 sacral decubitus with cellulitis  stercoral colitis, with disimpaction  cholelithiases  HTN   Dementia (baseline AOx1-2)  bed bound  hypothyroidism

## 2024-09-11 NOTE — PROGRESS NOTE ADULT - PROVIDER SPECIALTY LIST ADULT
Infectious Disease
Wound Care
Infectious Disease
Hospitalist
Hospitalist
Internal Medicine
Hospitalist
Internal Medicine
Hospitalist

## 2024-09-11 NOTE — DISCHARGE NOTE PROVIDER - ATTENDING DISCHARGE PHYSICAL EXAMINATION:
CONSTITUTIONAL: Frail, laying in bed  EYES: No conjunctival or scleral injection, non-icteric; PERRLA and symmetric  ENMT: Moist oral mucosa   RESPIRATORY: Breathing comfortably; lungs CTA without wheeze/rhonchi/rales  CARDIOVASCULAR: +S1S2, RRR, no M/G/R  GASTROINTESTINAL: Soft, no tenderness to palpation, +BS, no rebound/guarding  MUSCULOSKELETAL: no peripheral edema  SKIN: Patient declining current examination; but with reported deep sacral wounds with escharing  NEURO/PSYCH: A+O to self, location, answering questions appropriately though [facility] which is reportedly around baseline for pt

## 2024-09-11 NOTE — PROGRESS NOTE ADULT - ASSESSMENT
A/P: 89yof pmhx of HTN Dementia (baseline AOx1-2) bed bound, hypothyroidism, brought in by EMS for AMS. [place]on at bedside provided colleratal. Patient was getting treated for her sacral wound with PO amoxicillin since 8/29. She was her usual self yesterday prior to bed. This morning, the aides noted she was slow to respond and not talking to family. Daughter noticed slurring of her speech since this morning but has not notice any weakness on either side of her body. Aides/daughter called PCP who advised to take he to the ER for IV abx. Daughter confirms DNR/DNI with minimal invasive interventions but agreeable for IV abx. Patient is bedbound at baseline and has 24hour aides. She does have ep of agitation at home.       Wound Consult requested to assist w/ management of  Buttocks/ Sacrum unstageable and Deep tissue pressure injuries      Buttock/ sacral wound: aquacel dressing daily  Abx per Medicine/ ID  Moisturize intact skin w/ SWEEN cream BID  Nutrition Consult for optimization in pt w/ Increased nutritional needs        encourage high quality protein, alanna/ prosource, MVI & Vit C to promote wound healing  Continue turning and positioning w/ offloading assistive devices as per protocol  Buttocks Hua BID and prn soiling        Continue w/ attends under pads and Pericare as per protocol  Waffle Cushion to chair when oob to chair  Continue w/ low air loss pressure redistribution bed surface   Pt will need Group 2 mattress on hospital bed and ROHO cushion for wheel chair upon discharge home  Care as per medicine, will follow w/ you  Upon discharge f/u as outpatient at Wound Center [address] [place] [address] [phone]  D/w team & RN & attng  Thank you for this consult,  Nights/ Weekends/ Holidays please call:  General Surgery Consult pager ([phone]) for emergencies  Wound PT for multilayer leg wrapping or VAC issues (x [phone])   I spent 35 minutes face to face w/ this pt of which more than 50% of the time was spent counseling & coordinating care of this pt.

## 2024-09-11 NOTE — DISCHARGE NOTE PROVIDER - NSDCCPCAREPLAN_GEN_ALL_CORE_FT
PRINCIPAL DISCHARGE DIAGNOSIS  Diagnosis: Altered mental status  Assessment and Plan of Treatment:       SECONDARY DISCHARGE DIAGNOSES  Diagnosis: Encephalopathy  Assessment and Plan of Treatment:     Diagnosis: Cellulitis of sacral region  Assessment and Plan of Treatment:     Diagnosis: Cholelithiasis  Assessment and Plan of Treatment:      PRINCIPAL DISCHARGE DIAGNOSIS  Diagnosis: Encephalopathy  Assessment and Plan of Treatment: Encehlaopthy likly in the setting of infection   CT head with no stroke  completed antibiotics      SECONDARY DISCHARGE DIAGNOSES  Diagnosis: Cellulitis of sacral region  Assessment and Plan of Treatment: comnpleted antibiotics    Diagnosis: Cholelithiasis  Assessment and Plan of Treatment: Follow up with PCP    Diagnosis: Stercoral colitis  Assessment and Plan of Treatment: you recieved aggressive bowel regimen with improvement  continue laxatives    Diagnosis: Unstageable pressure ulcer of sacral region  Assessment and Plan of Treatment: you were evaluted bby wound care for   BL buttocks unstageable and Deep tissue pressure injuries  Lt heel DTPI  Recommendations  BL buttock injuries: Medihoney aquacel  Lt heel Betadine daily      off load with heel boots  Follow up with wound care center       PRINCIPAL DISCHARGE DIAGNOSIS  Diagnosis: Encephalopathy  Assessment and Plan of Treatment: Encephlaopthy likely in the setting of infection   CT head with no stroke  completed antibiotics      SECONDARY DISCHARGE DIAGNOSES  Diagnosis: Cellulitis of sacral region  Assessment and Plan of Treatment: Treated with antibiotics. Wound care provided. Follow up with Wound care.    Diagnosis: Cholelithiasis  Assessment and Plan of Treatment: Evaluated for Incidental finding of  Cholelithiasis with hyperdensity in distal cbd (?choledocholithiasis or sludge). Confirmed with HCP niece who wants to hold of on MRCP and see if patient improves with IV abx, wanting to limit invasive interventions. Follow up with PCP.    Diagnosis: Stercoral colitis  Assessment and Plan of Treatment: You recieved aggressive bowel regimen with improvement  continue laxatives    Diagnosis: Unstageable pressure ulcer of sacral region  Assessment and Plan of Treatment: you were evaluated by wound care for   BL buttocks unstageable and Deep tissue pressure injuries  Lt heel DTPI  Recommendations  BL buttock injuries: Medihoney aquacel  Lt heel Betadine daily      off load with heel boots  Follow up with wound care center

## 2024-09-12 ENCOUNTER — APPOINTMENT (OUTPATIENT)
Dept: HOME HEALTH SERVICES | Facility: HOME HEALTH | Age: 89
End: 2024-09-12

## 2024-09-12 ENCOUNTER — NON-APPOINTMENT (OUTPATIENT)
Age: 89
End: 2024-09-12

## 2024-09-12 RX ORDER — TRAMADOL HYDROCHLORIDE 50 MG/1
50 TABLET, COATED ORAL AT BEDTIME
Refills: 0 | Status: ACTIVE | COMMUNITY
Start: 2024-09-12

## 2024-09-13 ENCOUNTER — TRANSCRIPTION ENCOUNTER (OUTPATIENT)
Age: 89
End: 2024-09-13

## 2024-09-13 PROBLEM — F03.90 UNSPECIFIED DEMENTIA, UNSPECIFIED SEVERITY, WITHOUT BEHAVIORAL DISTURBANCE, PSYCHOTIC DISTURBANCE, MOOD DISTURBANCE, AND ANXIETY: Chronic | Status: ACTIVE | Noted: 2024-09-04

## 2024-09-15 ENCOUNTER — NON-APPOINTMENT (OUTPATIENT)
Age: 89
End: 2024-09-15

## 2024-09-17 ENCOUNTER — NON-APPOINTMENT (OUTPATIENT)
Age: 89
End: 2024-09-17

## 2024-09-17 DIAGNOSIS — B37.2 CANDIDIASIS OF SKIN AND NAIL: ICD-10-CM

## 2024-09-17 RX ORDER — NYSTATIN 100000 U/G
100000 OINTMENT TOPICAL
Qty: 2 | Refills: 2 | Status: ACTIVE | COMMUNITY
Start: 2024-09-17 | End: 1900-01-01

## 2024-09-18 ENCOUNTER — APPOINTMENT (OUTPATIENT)
Dept: HOME HEALTH SERVICES | Facility: HOME HEALTH | Age: 89
End: 2024-09-18

## 2024-09-18 VITALS
HEART RATE: 85 BPM | DIASTOLIC BLOOD PRESSURE: 58 MMHG | OXYGEN SATURATION: 97 % | RESPIRATION RATE: 17 BRPM | TEMPERATURE: 98.3 F | SYSTOLIC BLOOD PRESSURE: 106 MMHG

## 2024-09-20 ENCOUNTER — APPOINTMENT (OUTPATIENT)
Dept: HOME HEALTH SERVICES | Facility: HOME HEALTH | Age: 89
End: 2024-09-20
Payer: MEDICARE

## 2024-09-20 VITALS
DIASTOLIC BLOOD PRESSURE: 60 MMHG | OXYGEN SATURATION: 99 % | SYSTOLIC BLOOD PRESSURE: 108 MMHG | TEMPERATURE: 97.9 F | RESPIRATION RATE: 18 BRPM | HEART RATE: 90 BPM

## 2024-09-20 DIAGNOSIS — E03.9 HYPOTHYROIDISM, UNSPECIFIED: ICD-10-CM

## 2024-09-20 DIAGNOSIS — F03.918 UNSPECIFIED DEMENTIA, UNSPECIFIED SEVERITY, WITH OTHER BEHAVIORAL DISTURBANCE: ICD-10-CM

## 2024-09-20 DIAGNOSIS — F02.80 ALZHEIMER'S DISEASE, UNSPECIFIED: ICD-10-CM

## 2024-09-20 DIAGNOSIS — R19.7 DIARRHEA, UNSPECIFIED: ICD-10-CM

## 2024-09-20 DIAGNOSIS — L89.154 PRESSURE ULCER OF SACRAL REGION, STAGE 4: ICD-10-CM

## 2024-09-20 DIAGNOSIS — R77.0 ABNORMALITY OF ALBUMIN: ICD-10-CM

## 2024-09-20 DIAGNOSIS — G30.9 ALZHEIMER'S DISEASE, UNSPECIFIED: ICD-10-CM

## 2024-09-20 PROCEDURE — 99495 TRANSJ CARE MGMT MOD F2F 14D: CPT

## 2024-09-20 NOTE — COUNSELING
[Normal Weight - ( BMI  <25 )] : normal weight - ( BMI  <25 ) [Continue diet as tolerated] : continue diet as tolerated based on goals of care [Non - Smoker] : non-smoker [Smoke/CO Detectors] : smoke/CO detectors [Use grab bars] : use grab bars [Remove clutter and unsafe carpeting to avoid falls] : remove clutter and unsafe carpeting to avoid falls [] : abdominal aortic ultrasound [Decrease hospital use] : decrease hospital use [Minimize unnecessary interventions] : minimize unnecessary interventions [Comfort Care] : comfort care [Discussed disease trajectory with patient/caregiver] : discussed disease trajectory with patient/caregiver [Likely to achieve goals/desired outcomes] : likely to achieve goals/desired outcomes [Patient/Caregiver has ___ understanding of disease process] : patient/caregiver has [unfilled] understanding of disease process [Advanced Directives discussed: ____] : Advanced directives discussed: [unfilled] [Completed Medical Orders for Life-Sustaining Treatment] : completed medical orders for life-sustaining treatment [DNR] : Code Status: DNR [Comfort] : Treatment Guidelines: Comfort [DNI] : Intubation: DNI [[facility]: _____[facility]: [unfilled] [_____] : HCP: [unfilled]

## 2024-09-20 NOTE — ADDENDUM
[FreeTextEntry1] : Documented by Zonia Mcdonnell acting as a scribe under Dr. Kezia Darling. 09/20/2024

## 2024-09-20 NOTE — ASSESSMENT
[FreeTextEntry1] : Discussed overall px with niece. No further hospitalizations desired at this time. Pt doing slightly better today.

## 2024-09-20 NOTE — HISTORY OF PRESENT ILLNESS
[Family Member] : family member [Formal Caregiver] : formal caregiver [FreeTextEntry1] : dementia [FreeTextEntry2] : 9/20/2024 COVID SCREEN:Patient or caretaker denies fever, cough, trouble breathing, rash, vomiting. Patient has not been in close contact with anyone who is COVID-19 positive, or suspected of having COVID-19.   N95 mask, gloves, eye wear and gown (if indicated) used during visit: Yes.    PMH Advanced Alzheimer dementia, Hypothyroidisms, OA, frequent falls. Information provided by Kenton Churchill  9/20/24 -Pt was discharged last Wednesday, began to have diarrhea on Monday, has had wound care involved, had an episode of being unresponsive Tuesday which subsequently resolved. Discussed with the niece who wanted to wait and see how the patient did.  -Wound nurse (Yamilex) was there today, states the wound is looking better, tachycardic on her exam but otherwise stable  -she's having 4 soft brown bowel movements a day, no fever, no abdominal pain, no urinary symptoms  -breathing is normal, sleep has been okay, no change in behaviors. Risperidone in the morning, and then she is up a little bit at night. -Tramadol, PRN; Tylenol, PRN. -2x ensure a day plus small amounts of solid food and trying to increase fluids   8/1/24 Dementia/Agitation improved with half pill of risperidone, just started to give regularly 3 weeks ago, seems to help but still some agitation. Diet - 2-3 ensure, watermelon, a little water Skin - ok Lump on leg improving Pain - Tylenol 650 - giving 1-2 tabs prn for pain, pt c/o pain when getting dressed. Have avoided giving Tramadol because of constipaiton. Off omeprazolr Constipation - not taking anything, going daily, but hard, has to strain Refused physical therapy when came ROS: Pt denies CP, SOB, n/v, rash or skin irritation, changes in vision, does c/o difficulty hearing, feels well overall, tired, mood is ok, limited appetite, no swallowing issues, no obvious weight changes, not sure why she doesn't get out of bed except nowhere to go.  5/9/24 Interval events: more agitation in evenings, trying to scratch caregivers during changes/cleaning.  Patient seen laying in bed in NAD Gait/Falls/Assisted devices: bedbound, unable to leave the bed- 2 person assist.  Skin: L leg has a lump (was told that it was nothing).  Pain: back and hips Appetite: decreasing, only eating 2 ensure a day. eats minimal - a little fruit, bread or pasta but small amounts.  BM: Regular.  Urine: incontinent Sleep: sleeps well.  Mood: Easily agitated, paranoia towards caregivers.   Active medical problems: #Alzheimer's dementia, advanced dependent of all iadls and adls, 24/7 care, bedbound. No longer on sertraline. Some agitation.  #Hypothyroidism - levothyroxine 75mcg daily #Falls - bedbound now, multiple hospitalization for falls #OA - Tramadol 25mg BID prn (not really using) L leg with "fixator"   Social: Lives with HHA 24/7 - private for now. Niece lives nearby DME: wheelchair (standard), commode, walker, grab bar.  HCP: Kenton Churchill MOLST: DNR/DNI/DNH (except for uncontrolled sxs), no FT or dialysis, yes ivf and abx

## 2024-09-20 NOTE — PHYSICAL EXAM
[No Acute Distress] : no acute distress [Normal Voice/Communication] : normal voice communication [Normal Sclera/Conjunctiva] : normal sclera/conjunctiva [EOMI] : extra ocular movement intact [Normal Outer Ear/Nose] : the ears and nose were normal in appearance [Normal Oropharynx] : the oropharynx was normal [Supple] : the neck was supple [No LAD] : no lymphadenopathy [No Respiratory Distress] : no respiratory distress [Clear to Auscultation] : lungs were clear to auscultation bilaterally [No Accessory Muscle Use] : no accessory muscle use [Normal Rate] : heart rate was normal  [Regular Rhythm] : with a regular rhythm [Normal S1, S2] : normal S1 and S2 [No Murmurs] : no murmurs heard [No Edema] : there was no peripheral edema [Normal Bowel Sounds] : normal bowel sounds [Soft] : abdomen soft [Not Distended] : not distended [Normal Post Cervical Nodes] : no posterior cervical lymphadenopathy [Normal Anterior Cervical Nodes] : no anterior cervical lymphadenopathy [No Spinal Tenderness] : no spinal tenderness [No Rash] : no rash [Cranial Nerves Intact] : cranial nerves 2-12 were intact [Normal Affect] : the affect was normal [Normal Mood] : the mood was normal [de-identified] : frail elderly lady laying in bed, AAO to self, aide, niece; conversational states "I don't want to see a nurse" [de-identified] : no respiratory distress, breathing comfortably, declined being turned but anterior lung fields clear  [de-identified] : soft, positive bowel sounds, mildly tender to palpation in the LLQ  [de-identified] : diffuse weakness, bedbound [de-identified] : sacrum checked by nurse (photo included); pt declined  [de-identified] : generalized weakness [de-identified] : no edema, dressing on L heel

## 2024-09-20 NOTE — REASON FOR VISIT
[Follow-Up] : a follow-up visit [Family Member] : family member [Formal Caregiver] : formal caregiver [Pre-Visit Preparation] : pre-visit preparation was done [Intercurrent Specialty/Sub-specialty Visits] : the patient has intercurrent specialty/sub-specialty visits [Post-hospitalization from ___ Hospital] : Post-hospitalization from [unfilled] Hospital [Admitted on: ___] : The patient was admitted on [unfilled] [Discharged on ___] : discharged on [unfilled] [Discharge Summary] : discharge summary [Discharge Med List] : discharge medication list [Med Reconciliation] : medication reconciliation has been completed [Patient Contacted By: ____] : and contacted by [unfilled] [Post Hospitalization] : a post hospitalization visit [FreeTextEntry3] : wound infection, sepsis [FreeTextEntry1] : wound infection [FreeTextEntry2] : chart review

## 2024-09-20 NOTE — PHYSICAL EXAM
[No Acute Distress] : no acute distress [Normal Voice/Communication] : normal voice communication [Normal Sclera/Conjunctiva] : normal sclera/conjunctiva [EOMI] : extra ocular movement intact [Normal Outer Ear/Nose] : the ears and nose were normal in appearance [Normal Oropharynx] : the oropharynx was normal [Supple] : the neck was supple [No LAD] : no lymphadenopathy [No Respiratory Distress] : no respiratory distress [Clear to Auscultation] : lungs were clear to auscultation bilaterally [No Accessory Muscle Use] : no accessory muscle use [Normal Rate] : heart rate was normal  [Regular Rhythm] : with a regular rhythm [Normal S1, S2] : normal S1 and S2 [No Murmurs] : no murmurs heard [No Edema] : there was no peripheral edema [Normal Bowel Sounds] : normal bowel sounds [Soft] : abdomen soft [Not Distended] : not distended [Normal Post Cervical Nodes] : no posterior cervical lymphadenopathy [Normal Anterior Cervical Nodes] : no anterior cervical lymphadenopathy [No Spinal Tenderness] : no spinal tenderness [No Rash] : no rash [Cranial Nerves Intact] : cranial nerves 2-12 were intact [Normal Affect] : the affect was normal [Normal Mood] : the mood was normal [de-identified] : frail elderly lady laying in bed, AAO to self, aide, niece; conversational states "I don't want to see a nurse" [de-identified] : no respiratory distress, breathing comfortably, declined being turned but anterior lung fields clear  [de-identified] : soft, positive bowel sounds, mildly tender to palpation in the LLQ  [de-identified] : diffuse weakness, bedbound [de-identified] : sacrum checked by nurse (photo included); pt declined  [de-identified] : generalized weakness [de-identified] : no edema, dressing on L heel

## 2024-09-20 NOTE — END OF VISIT
[Time Spent: ___ minutes] : I have spent [unfilled] minutes of time on the encounter which excludes teaching and separately reported services. [FreeTextEntry3] : Documented by Zonia Mcdonnell acting as a scribe for Dr. Kezia Darling. 09/20/2024   All medical record entries made by the Scribe were at my, Dr. Kezia Darling, direction and personally dictated by me on 09/20/2024. I have reviewed the chart and agree that the record accurately reflects my personal performance of the history, physical exam, assessment and plan. I have also personally directed, reviewed, and agreed with the chart.

## 2024-09-20 NOTE — COUNSELING
[Normal Weight - ( BMI  <25 )] : normal weight - ( BMI  <25 ) [Continue diet as tolerated] : continue diet as tolerated based on goals of care [Non - Smoker] : non-smoker [Smoke/CO Detectors] : smoke/CO detectors [Use grab bars] : use grab bars [Remove clutter and unsafe carpeting to avoid falls] : remove clutter and unsafe carpeting to avoid falls [] : abdominal aortic ultrasound [Decrease hospital use] : decrease hospital use [Minimize unnecessary interventions] : minimize unnecessary interventions [Comfort Care] : comfort care [Discussed disease trajectory with patient/caregiver] : discussed disease trajectory with patient/caregiver [Likely to achieve goals/desired outcomes] : likely to achieve goals/desired outcomes [Patient/Caregiver has ___ understanding of disease process] : patient/caregiver has [unfilled] understanding of disease process [Advanced Directives discussed: ____] : Advanced directives discussed: [unfilled] [Completed Medical Orders for Life-Sustaining Treatment] : completed medical orders for life-sustaining treatment [DNR] : Code Status: DNR [Comfort] : Treatment Guidelines: Comfort [DNI] : Intubation: DNI [[facility]: _____[facility]/last verification date: [unfilled] [_____] : HCP: [unfilled]

## 2024-09-22 ENCOUNTER — NON-APPOINTMENT (OUTPATIENT)
Age: 89
End: 2024-09-22

## 2024-09-23 ENCOUNTER — LABORATORY RESULT (OUTPATIENT)
Age: 89
End: 2024-09-23

## 2024-09-23 ENCOUNTER — NON-APPOINTMENT (OUTPATIENT)
Age: 89
End: 2024-09-23

## 2024-09-24 ENCOUNTER — LABORATORY RESULT (OUTPATIENT)
Age: 89
End: 2024-09-24

## 2024-09-24 ENCOUNTER — NON-APPOINTMENT (OUTPATIENT)
Age: 89
End: 2024-09-24

## 2024-09-27 ENCOUNTER — APPOINTMENT (OUTPATIENT)
Dept: HOME HEALTH SERVICES | Facility: HOME HEALTH | Age: 89
End: 2024-09-27

## 2024-09-29 PROCEDURE — 82947 ASSAY GLUCOSE BLOOD QUANT: CPT

## 2024-09-29 PROCEDURE — 83605 ASSAY OF LACTIC ACID: CPT

## 2024-09-29 PROCEDURE — 80048 BASIC METABOLIC PNL TOTAL CA: CPT

## 2024-09-29 PROCEDURE — 84443 ASSAY THYROID STIM HORMONE: CPT

## 2024-09-29 PROCEDURE — 87040 BLOOD CULTURE FOR BACTERIA: CPT

## 2024-09-29 PROCEDURE — 74177 CT ABD & PELVIS W/CONTRAST: CPT | Mod: MC

## 2024-09-29 PROCEDURE — 85652 RBC SED RATE AUTOMATED: CPT

## 2024-09-29 PROCEDURE — 87641 MR-STAPH DNA AMP PROBE: CPT

## 2024-09-29 PROCEDURE — 85025 COMPLETE CBC W/AUTO DIFF WBC: CPT

## 2024-09-29 PROCEDURE — 70450 CT HEAD/BRAIN W/O DYE: CPT | Mod: MC

## 2024-09-29 PROCEDURE — 85730 THROMBOPLASTIN TIME PARTIAL: CPT

## 2024-09-29 PROCEDURE — 36415 COLL VENOUS BLD VENIPUNCTURE: CPT

## 2024-09-29 PROCEDURE — 93005 ELECTROCARDIOGRAM TRACING: CPT

## 2024-09-29 PROCEDURE — 84295 ASSAY OF SERUM SODIUM: CPT

## 2024-09-29 PROCEDURE — 87637 SARSCOV2&INF A&B&RSV AMP PRB: CPT

## 2024-09-29 PROCEDURE — 84132 ASSAY OF SERUM POTASSIUM: CPT

## 2024-09-29 PROCEDURE — 82803 BLOOD GASES ANY COMBINATION: CPT

## 2024-09-29 PROCEDURE — 87186 SC STD MICRODIL/AGAR DIL: CPT

## 2024-09-29 PROCEDURE — 80053 COMPREHEN METABOLIC PANEL: CPT

## 2024-09-29 PROCEDURE — 80202 ASSAY OF VANCOMYCIN: CPT

## 2024-09-29 PROCEDURE — 82435 ASSAY OF BLOOD CHLORIDE: CPT

## 2024-09-29 PROCEDURE — 96374 THER/PROPH/DIAG INJ IV PUSH: CPT

## 2024-09-29 PROCEDURE — 83735 ASSAY OF MAGNESIUM: CPT

## 2024-09-29 PROCEDURE — 85014 HEMATOCRIT: CPT

## 2024-09-29 PROCEDURE — 87640 STAPH A DNA AMP PROBE: CPT

## 2024-09-29 PROCEDURE — 85018 HEMOGLOBIN: CPT

## 2024-09-29 PROCEDURE — 87070 CULTURE OTHR SPECIMN AEROBIC: CPT

## 2024-09-29 PROCEDURE — 87077 CULTURE AEROBIC IDENTIFY: CPT

## 2024-09-29 PROCEDURE — 97161 PT EVAL LOW COMPLEX 20 MIN: CPT

## 2024-09-29 PROCEDURE — 86140 C-REACTIVE PROTEIN: CPT

## 2024-09-29 PROCEDURE — 82330 ASSAY OF CALCIUM: CPT

## 2024-09-29 PROCEDURE — 99285 EMERGENCY DEPT VISIT HI MDM: CPT | Mod: 25

## 2024-09-29 PROCEDURE — 71045 X-RAY EXAM CHEST 1 VIEW: CPT

## 2024-09-29 PROCEDURE — 85610 PROTHROMBIN TIME: CPT

## 2024-09-30 ENCOUNTER — APPOINTMENT (OUTPATIENT)
Dept: WOUND CARE | Facility: CLINIC | Age: 89
End: 2024-09-30

## 2024-09-30 VITALS
RESPIRATION RATE: 16 BRPM | HEART RATE: 95 BPM | TEMPERATURE: 98.2 F | OXYGEN SATURATION: 95 % | SYSTOLIC BLOOD PRESSURE: 101 MMHG | DIASTOLIC BLOOD PRESSURE: 65 MMHG

## 2024-09-30 PROCEDURE — 11045 DBRDMT SUBQ TISS EACH ADDL: CPT

## 2024-09-30 PROCEDURE — 11042 DBRDMT SUBQ TIS 1ST 20SQCM/<: CPT

## 2024-09-30 PROCEDURE — 99205 OFFICE O/P NEW HI 60 MIN: CPT | Mod: 25

## 2024-09-30 NOTE — PHYSICAL EXAM
[1+] : left 1+ [Alert] : alert [Oriented to Person] : oriented to person [Oriented to Place] : oriented to place [Oriented to Time] : oriented to time [Calm] : calm [Please See PDF for Tissue Analytics] : Please See PDF for [employer]. [de-identified] : well developed, well nourished, no acute distress [de-identified] : normocephalic, atraumatic [de-identified] : supple [de-identified] :  symmetrical rise and fall of chest wall [de-identified] : soft, nontender [de-identified] : Sacrum - stage 4 pressure injury with moderate sloughing and fibrinous exudate Left hip - Stage 2 pressure injury with moderate sloughing Right hip - stage 2 pressure injury with moderate slouging Left heel - unstageable pressure injury with overlying callous

## 2024-09-30 NOTE — HISTORY OF PRESENT ILLNESS
[FreeTextEntry1] : 9-30-24 Ms. Clark is an 88 yo F with PMH of  dementia, constipation, frequent falls  presents to the office with a wound for 4 weeks duration.  The wound is located on  the sacrum and the left heel. Per patient's niece who is present with her, the patient had a pressure injury prior to her most recent hospitalization 2 weeks ago that worsened. She was hospitalized for presumed wound infection and discharged home on levaquin which she continues to take. She also hands wounds on the bilateral hips and left heel. Per niece, she has a 24/7 home nurse and an aide. She does not ambulate and spends most of the day in bed. She has an air pressure mattress. They have been dressing the wounds with medihoney. No recent fevers or chills.  The patient is being followed by Dr. Darling who sees the patient for home visits and has been keeping track of the wound. The patient has localized pain to the wound upon dressing changes.  The patient has no other complaints or associated symptoms. Patient accompanied by her niece who hold the health care proxy.

## 2024-09-30 NOTE — ASSESSMENT
[FreeTextEntry1] : 9-30-24 The patient presents with pressure injury wounds to the sacrum, bilateral hips, and left heel. Patient bed bound Recent hospitalization for likely wound infection being managed by Dr. Darling Has home health nurse and aide  On exam: Sacrum - Stage 4 pressure injury with sloughing and fibrinous exudate. S/p excisional debridement. No s/s of infection Left hip - Stage 3 pressure injury with moderate sloughing. S/p excisional debridement. No s/s of infection Right hip - stage 2 pressure injury with moderate sloughing. S/p mechanical debridement. No s/s of infection Left heel - unstageable pressure injury with overlying callous. S/p excisional debridement revealing healing stage 3 PI. No s/s of infection

## 2024-09-30 NOTE — REASON FOR VISIT
[Consultation] : a consultation visit [Family Member] : family member [FreeTextEntry1] : sacral wound, left heel wound

## 2024-09-30 NOTE — PLAN
[FreeTextEntry1] : 9-30-24 Plan: Apply lidocaine or topical anesthetic if needed to reduce pain upon washing the wound. Wash wound with Dakins 0.125% or Vashe Apply medical grade honey/and foam dressing to bilateral hip wounds Pack Left Buttock with vashe moistened 2" kurlix, cover with adhesive foam Apply iodosorb to left heel wound and cover with gauze and wrap with herminia Change dressing daily PRN if dressings become soiled Optimization of nutrition. Offloading to the wound site. Group II Pressure relief mattress/boots recommended Katerina gonzalez recommended Nursing instructions given f/u in 2-3 weeks via telehealth with Dr. Hernandez

## 2024-10-04 ENCOUNTER — APPOINTMENT (OUTPATIENT)
Dept: HOME HEALTH SERVICES | Facility: HOME HEALTH | Age: 89
End: 2024-10-04
Payer: MEDICARE

## 2024-10-04 VITALS — RESPIRATION RATE: 18 BRPM | OXYGEN SATURATION: 98 % | TEMPERATURE: 98.1 F | HEART RATE: 97 BPM

## 2024-10-04 DIAGNOSIS — L89.322 PRESSURE ULCER OF LEFT BUTTOCK, STAGE 2: ICD-10-CM

## 2024-10-04 DIAGNOSIS — L89.626 PRESSURE-INDUCED DEEP TISSUE DAMAGE OF LT HEEL: ICD-10-CM

## 2024-10-04 DIAGNOSIS — L89.621 PRESSURE ULCER OF LEFT HEEL, STAGE 1: ICD-10-CM

## 2024-10-04 DIAGNOSIS — L89.611 PRESSURE ULCER OF RIGHT HEEL, STAGE 1: ICD-10-CM

## 2024-10-04 DIAGNOSIS — G30.9 ALZHEIMER'S DISEASE, UNSPECIFIED: ICD-10-CM

## 2024-10-04 DIAGNOSIS — L89.323 PRESSURE ULCER OF LEFT BUTTOCK, STAGE 3: ICD-10-CM

## 2024-10-04 DIAGNOSIS — D64.9 ANEMIA, UNSPECIFIED: ICD-10-CM

## 2024-10-04 DIAGNOSIS — L08.9 OTHER INJURY OF UNSPECIFIED BODY REGION, INITIAL ENCOUNTER: ICD-10-CM

## 2024-10-04 DIAGNOSIS — L89.154 PRESSURE ULCER OF SACRAL REGION, STAGE 4: ICD-10-CM

## 2024-10-04 DIAGNOSIS — K59.09 OTHER CONSTIPATION: ICD-10-CM

## 2024-10-04 DIAGNOSIS — F02.80 ALZHEIMER'S DISEASE, UNSPECIFIED: ICD-10-CM

## 2024-10-04 DIAGNOSIS — T14.8XXA OTHER INJURY OF UNSPECIFIED BODY REGION, INITIAL ENCOUNTER: ICD-10-CM

## 2024-10-04 PROCEDURE — 99349 HOME/RES VST EST MOD MDM 40: CPT

## 2024-10-04 RX ORDER — LEVOFLOXACIN 750 MG/1
750 TABLET, FILM COATED ORAL DAILY
Qty: 7 | Refills: 1 | Status: DISCONTINUED | COMMUNITY
Start: 2024-09-24 | End: 2024-10-04

## 2024-10-04 NOTE — REASON FOR VISIT
[Post Hospitalization] : a post hospitalization visit [Family Member] : family member [Formal Caregiver] : formal caregiver [Pre-Visit Preparation] : pre-visit preparation was done [Intercurrent Specialty/Sub-specialty Visits] : the patient has intercurrent specialty/sub-specialty visits [Post-hospitalization from ___ Hospital] : Post-hospitalization from [unfilled] Hospital [Admitted on: ___] : The patient was admitted on [unfilled] [Discharged on ___] : discharged on [unfilled] [Discharge Summary] : discharge summary [Discharge Med List] : discharge medication list [Med Reconciliation] : medication reconciliation has been completed [Patient Contacted By: ____] : and contacted by [unfilled] [FreeTextEntry3] : wound infection, sepsis [Acute] : an acute visit [FreeTextEntry1] : wound complications [FreeTextEntry2] : chart review

## 2024-10-04 NOTE — ADDENDUM
[FreeTextEntry1] :  Ashely TEMPLETON assisted in documentation on 10/04/2024  acting as a scribe for Dr. Kezia Darling.  Adequate: hears normal conversation without difficulty

## 2024-10-04 NOTE — ASSESSMENT
[FreeTextEntry1] : ISTOP reviewed: Reference #: [id number] [facility] Re[facility] paperwork left for HCP to review and sign. Requested flu shot after visit was complete, will request RN visit.

## 2024-10-04 NOTE — PHYSICAL EXAM
[Normal Voice/Communication] : normal voice communication [Normal Sclera/Conjunctiva] : normal sclera/conjunctiva [EOMI] : extra ocular movement intact [Normal Outer Ear/Nose] : the ears and nose were normal in appearance [Regular Rhythm] : with a regular rhythm [Normal S1, S2] : normal S1 and S2 [No Murmurs] : no murmurs heard [No Edema] : there was no peripheral edema [Normal Bowel Sounds] : normal bowel sounds [Soft] : abdomen soft [Not Distended] : not distended [Normal Post Cervical Nodes] : no posterior cervical lymphadenopathy [Normal Anterior Cervical Nodes] : no anterior cervical lymphadenopathy [No Spinal Tenderness] : no spinal tenderness [No Rash] : no rash [Cranial Nerves Intact] : cranial nerves 2-12 were intact [Normal Affect] : the affect was normal [Normal Mood] : the mood was normal [Normal Rate] : heart rate was normal  [de-identified] : Positive bowel sounds, soft  [de-identified] : diffuse weakness, bedbound. Sacrum deferred as RN arrived at end of visit to change dressing.     [No Acute Distress] : no acute distress [de-identified] : Sleeping for most of exam, then becomes agitated, calling "leave me alone" and pushing me away, unable to check BP, lungs   [de-identified] :  Patient declined   [de-identified] : Borderline tachycardia, RRR, no MRG  [de-identified] : diffuse weakness, bedbound. Sacrum deferred as RN arrived at end of visit to change dressing, photos sent by RN included below..     [de-identified] : generalized weakness [de-identified] : Left heel bandage in place. CDI right heel, mild erythema, both feet resting on mattress.

## 2024-10-04 NOTE — ASSESSMENT
[FreeTextEntry1] : ISTOP reviewed: Reference #: [id number] [facility] Re[facility] paperwork left for HCP to review and sign. Requested flu shot after visit was complete, will request RN visit.

## 2024-10-04 NOTE — HISTORY OF PRESENT ILLNESS
[Family Member] : family member [Formal Caregiver] : formal caregiver [FreeTextEntry1] : dementia [FreeTextEntry2] : 10/4/2024 COVID SCREEN:Patient or caretaker denies fever, cough, trouble breathing, rash, vomiting. Patient has not been in close contact with anyone who is COVID-19 positive, or suspected of having COVID-19.   N95 mask, gloves, eye wear and gown (if indicated) used during visit: Yes.  PMH Advanced Alzheimer dementia, Hypothyroidisms, OA, frequent falls. Information provided by Kenton Churchill   10/4/24 pain: Tramadol before wound changes is helping  BM: previously with diarrhea. Not taking  iron or stool softener.  No BM since yesterday but had one on Wednesday.  Eating better. Took 2.5 ensure yesterday and half today.  Wound: Levaquin she has half a pill left, sometimes pt did not want to take. Saw surgery Monday who revised wound recs. Nurse coming daily. Wound is stable  Sleep: half of .25 risperidone at night is working well.  No CP, SOB, urinary sx  9/20/24 -Pt was discharged last Wednesday, began to have diarrhea on Monday, has had wound care involved, had an episode of being unresponsive Tuesday which subsequently resolved. Discussed with the niece who wanted to wait and see how the patient did.  -Wound nurse ([facility]) was there today, states the wound is looking better, tachycardic on her exam but otherwise stable  -she's having 4 soft brown bowel movements a day, no fever, no abdominal pain, no urinary symptoms  -breathing is normal, sleep has been okay, no change in behaviors. Risperidone in the morning, and then she is up a little bit at night. -Tramadol, PRN; Tylenol, PRN. -2x ensure a day plus small amounts of solid food and trying to increase fluids   8/1/24 Dementia/Agitation improved with half pill of risperidone, just started to give regularly 3 weeks ago, seems to help but still some agitation. Diet - 2-3 ensure, watermelon, a little water Skin - ok Lump on leg improving Pain - Tylenol 650 - giving 1-2 tabs prn for pain, pt c/o pain when getting dressed. Have avoided giving Tramadol because of constipaiton. Off omeprazolr Constipation - not taking anything, going daily, but hard, has to strain Refused physical therapy when came ROS: Pt denies CP, SOB, n/v, rash or skin irritation, changes in vision, does c/o difficulty hearing, feels well overall, tired, mood is ok, limited appetite, no swallowing issues, no obvious weight changes, not sure why she doesn't get out of bed except nowhere to go.  5/9/24 Interval events: more agitation in evenings, trying to scratch caregivers during changes/cleaning.  Patient seen laying in bed in NAD Gait/Falls/Assisted devices: bedbound, unable to leave the bed- 2 person assist.  Skin: L leg has a lump (was told that it was nothing).  Pain: back and hips Appetite: decreasing, only eating 2 ensure a day. eats minimal - a little fruit, bread or pasta but small amounts.  BM: Regular.  Urine: incontinent Sleep: sleeps well.  Mood: Easily agitated, paranoia towards caregivers.   Active medical problems: #Alzheimer's dementia, advanced dependent of all iadls and adls, 24/7 care, bedbound. No longer on sertraline. Some agitation.  #Hypothyroidism - levothyroxine 75mcg daily #Falls - bedbound now, multiple hospitalization for falls #OA - Tramadol 25mg BID prn (not really using) L leg with "fixator"   Social: Lives with HHA 24/7 - private for now. Niece lives nearby DME: wheelchair (standard), commode, walker, grab bar.  HCP: Kenton CHUST: DNR/DNI/DNH (except for uncontrolled sxs), no FT or dialysis, yes ivf and abx

## 2024-10-04 NOTE — ADDENDUM
[FreeTextEntry1] :  Ashely TEMPLETON assisted in documentation on 10/04/2024  acting as a scribe for Dr. Kezia Darling.

## 2024-10-04 NOTE — PHYSICAL EXAM
[Normal Voice/Communication] : normal voice communication [Normal Sclera/Conjunctiva] : normal sclera/conjunctiva [EOMI] : extra ocular movement intact [Normal Outer Ear/Nose] : the ears and nose were normal in appearance [Regular Rhythm] : with a regular rhythm [Normal S1, S2] : normal S1 and S2 [No Murmurs] : no murmurs heard [No Edema] : there was no peripheral edema [Normal Bowel Sounds] : normal bowel sounds [Soft] : abdomen soft [Not Distended] : not distended [Normal Post Cervical Nodes] : no posterior cervical lymphadenopathy [Normal Anterior Cervical Nodes] : no anterior cervical lymphadenopathy [No Spinal Tenderness] : no spinal tenderness [No Rash] : no rash [Cranial Nerves Intact] : cranial nerves 2-12 were intact [Normal Affect] : the affect was normal [Normal Mood] : the mood was normal [Normal Rate] : heart rate was normal  [de-identified] : Positive bowel sounds, soft  [de-identified] : diffuse weakness, bedbound. Sacrum deferred as RN arrived at end of visit to change dressing.     [No Acute Distress] : no acute distress [de-identified] : Sleeping for most of exam, then becomes agitated, calling "leave me alone" and pushing me away, unable to check BP, lungs   [de-identified] :  Patient declined   [de-identified] : Borderline tachycardia, RRR, no MRG  [de-identified] : generalized weakness [de-identified] : diffuse weakness, bedbound. Sacrum deferred as RN arrived at end of visit to change dressing, photos sent by RN included below..     [de-identified] : Left heel bandage in place. CDI right heel, mild erythema, both feet resting on mattress.

## 2024-10-04 NOTE — END OF VISIT
[Time Spent: ___ minutes] : I have spent [unfilled] minutes of time on the encounter which excludes teaching and separately reported services. [FreeTextEntry3] :  All medical record entries made by my scribe were at my, Dr. Kezia Darling, direction and personally dictated by me. I have reviewed the chart and agree that the record accurately reflects my personal performance of the history, physical exam, assessment and plan. I have also personally directed, reviewed, and agreed with the chart.

## 2024-10-04 NOTE — COUNSELING
[Normal Weight - ( BMI  <25 )] : normal weight - ( BMI  <25 ) [Continue diet as tolerated] : continue diet as tolerated based on goals of care [Non - Smoker] : non-smoker [Smoke/CO Detectors] : smoke/CO detectors [Use grab bars] : use grab bars [Remove clutter and unsafe carpeting to avoid falls] : remove clutter and unsafe carpeting to avoid falls [] : abdominal aortic ultrasound [Decrease hospital use] : decrease hospital use [Minimize unnecessary interventions] : minimize unnecessary interventions [Comfort Care] : comfort care [Discussed disease trajectory with patient/caregiver] : discussed disease trajectory with patient/caregiver [Likely to achieve goals/desired outcomes] : likely to achieve goals/desired outcomes [Patient/Caregiver has ___ understanding of disease process] : patient/caregiver has [unfilled] understanding of disease process [Advanced Directives discussed: ____] : Advanced directives discussed: [unfilled] [Completed Medical Orders for Life-Sustaining Treatment] : completed medical orders for life-sustaining treatment [DNR] : Code Status: DNR [Comfort] : Treatment Guidelines: Comfort [DNI] : Intubation: DNI [[facility]: _____[facility]/last verification date: [unfilled] [_____] : HCP: [unfilled]

## 2024-10-04 NOTE — CURRENT MEDS
Addended by: RAINER HOYT on: 7/26/2022 12:37 PM     Modules accepted: Orders, SmartSet     [Medication and Allergies Reconciled] : medication and allergies reconciled [High Risk Medications Reviewed and Reconciled (Beers Criteria)] : high risk medications reviewed and reconciled [Reviewed patient reported medication adherence from Comprehensive Assessment] : Reviewed patient reported medication adherence from comprehensive assessment [Adherent to medications] : Patient is adherent to medications as prescribed

## 2024-10-04 NOTE — COUNSELING
[Normal Weight - ( BMI  <25 )] : normal weight - ( BMI  <25 ) [Continue diet as tolerated] : continue diet as tolerated based on goals of care [Non - Smoker] : non-smoker [Smoke/CO Detectors] : smoke/CO detectors [Use grab bars] : use grab bars [Remove clutter and unsafe carpeting to avoid falls] : remove clutter and unsafe carpeting to avoid falls [] : abdominal aortic ultrasound [Decrease hospital use] : decrease hospital use [Minimize unnecessary interventions] : minimize unnecessary interventions [Comfort Care] : comfort care [Discussed disease trajectory with patient/caregiver] : discussed disease trajectory with patient/caregiver [Likely to achieve goals/desired outcomes] : likely to achieve goals/desired outcomes [Patient/Caregiver has ___ understanding of disease process] : patient/caregiver has [unfilled] understanding of disease process [Advanced Directives discussed: ____] : Advanced directives discussed: [unfilled] [Completed Medical Orders for Life-Sustaining Treatment] : completed medical orders for life-sustaining treatment [DNR] : Code Status: DNR [Comfort] : Treatment Guidelines: Comfort [DNI] : Intubation: DNI [[facility] Date: _____] [facility] Completion/last verification date: [unfilled] [_____] : HCP: [unfilled]

## 2024-10-12 ENCOUNTER — NON-APPOINTMENT (OUTPATIENT)
Age: 89
End: 2024-10-12

## 2024-10-12 ENCOUNTER — TRANSCRIPTION ENCOUNTER (OUTPATIENT)
Age: 89
End: 2024-10-12

## 2024-10-14 ENCOUNTER — APPOINTMENT (OUTPATIENT)
Dept: WOUND CARE | Facility: HOSPITAL | Age: 89
End: 2024-10-14
Payer: MEDICARE

## 2024-10-14 ENCOUNTER — OUTPATIENT (OUTPATIENT)
Dept: OUTPATIENT SERVICES | Facility: HOSPITAL | Age: 89
LOS: 1 days | End: 2024-10-14
Payer: MEDICARE

## 2024-10-14 DIAGNOSIS — Z96.60 PRESENCE OF UNSPECIFIED ORTHOPEDIC JOINT IMPLANT: Chronic | ICD-10-CM

## 2024-10-14 PROBLEM — L89.620 PRESSURE INJURY OF LEFT HEEL, UNSTAGEABLE: Status: ACTIVE | Noted: 2024-10-14

## 2024-10-14 PROBLEM — L89.212 PRESSURE INJURY OF RIGHT HIP, STAGE 2: Status: ACTIVE | Noted: 2024-10-14

## 2024-10-14 PROBLEM — L89.223 PRESSURE INJURY OF LEFT HIP, STAGE 3: Status: ACTIVE | Noted: 2024-10-14

## 2024-10-14 PROCEDURE — 99203 OFFICE O/P NEW LOW 30 MIN: CPT | Mod: 95

## 2024-10-15 DIAGNOSIS — Z23 ENCOUNTER FOR IMMUNIZATION: ICD-10-CM

## 2024-10-16 ENCOUNTER — APPOINTMENT (OUTPATIENT)
Dept: HOME HEALTH SERVICES | Facility: HOME HEALTH | Age: 89
End: 2024-10-16

## 2024-10-16 ENCOUNTER — MED ADMIN CHARGE (OUTPATIENT)
Age: 89
End: 2024-10-16

## 2024-10-16 VITALS
DIASTOLIC BLOOD PRESSURE: 68 MMHG | HEART RATE: 90 BPM | RESPIRATION RATE: 17 BRPM | OXYGEN SATURATION: 96 % | SYSTOLIC BLOOD PRESSURE: 110 MMHG | TEMPERATURE: 98.3 F

## 2024-10-24 DIAGNOSIS — L89.223 PRESSURE ULCER OF LEFT HIP, STAGE 3: ICD-10-CM

## 2024-10-25 ENCOUNTER — APPOINTMENT (OUTPATIENT)
Dept: HOME HEALTH SERVICES | Facility: HOME HEALTH | Age: 89
End: 2024-10-25

## 2024-10-25 VITALS
OXYGEN SATURATION: 95 % | SYSTOLIC BLOOD PRESSURE: 96 MMHG | DIASTOLIC BLOOD PRESSURE: 66 MMHG | RESPIRATION RATE: 16 BRPM | TEMPERATURE: 99 F | HEART RATE: 92 BPM

## 2024-10-25 DIAGNOSIS — L89.154 PRESSURE ULCER OF SACRAL REGION, STAGE 4: ICD-10-CM

## 2024-10-25 DIAGNOSIS — F02.80 ALZHEIMER'S DISEASE, UNSPECIFIED: ICD-10-CM

## 2024-10-25 DIAGNOSIS — E03.9 HYPOTHYROIDISM, UNSPECIFIED: ICD-10-CM

## 2024-10-25 DIAGNOSIS — R10.817 GENERALIZED ABDOMINAL TENDERNESS: ICD-10-CM

## 2024-10-25 DIAGNOSIS — L89.212 PRESSURE ULCER OF RIGHT HIP, STAGE 2: ICD-10-CM

## 2024-10-25 DIAGNOSIS — G30.9 ALZHEIMER'S DISEASE, UNSPECIFIED: ICD-10-CM

## 2024-10-25 DIAGNOSIS — L89.323 PRESSURE ULCER OF LEFT BUTTOCK, STAGE 3: ICD-10-CM

## 2024-10-25 DIAGNOSIS — L89.223 PRESSURE ULCER OF LEFT HIP, STAGE 3: ICD-10-CM

## 2024-10-25 DIAGNOSIS — L89.620 PRESSURE ULCER OF LEFT HEEL, UNSTAGEABLE: ICD-10-CM

## 2024-10-25 DIAGNOSIS — F03.918 UNSPECIFIED DEMENTIA, UNSPECIFIED SEVERITY, WITH OTHER BEHAVIORAL DISTURBANCE: ICD-10-CM

## 2024-10-25 PROCEDURE — 99349 HOME/RES VST EST MOD MDM 40: CPT

## 2024-11-06 ENCOUNTER — NON-APPOINTMENT (OUTPATIENT)
Age: 89
End: 2024-11-06

## 2024-11-11 ENCOUNTER — NON-APPOINTMENT (OUTPATIENT)
Age: 89
End: 2024-11-11

## 2024-11-11 DIAGNOSIS — T14.8XXA OTHER INJURY OF UNSPECIFIED BODY REGION, INITIAL ENCOUNTER: ICD-10-CM

## 2024-11-11 DIAGNOSIS — L08.9 OTHER INJURY OF UNSPECIFIED BODY REGION, INITIAL ENCOUNTER: ICD-10-CM

## 2024-11-12 ENCOUNTER — LABORATORY RESULT (OUTPATIENT)
Age: 89
End: 2024-11-12

## 2024-11-13 ENCOUNTER — NON-APPOINTMENT (OUTPATIENT)
Age: 89
End: 2024-11-13

## 2024-11-15 ENCOUNTER — NON-APPOINTMENT (OUTPATIENT)
Age: 89
End: 2024-11-15

## 2024-11-16 ENCOUNTER — TRANSCRIPTION ENCOUNTER (OUTPATIENT)
Age: 89
End: 2024-11-16

## 2024-11-16 ENCOUNTER — NON-APPOINTMENT (OUTPATIENT)
Age: 89
End: 2024-11-16

## 2024-11-17 PROBLEM — M86.10 OSTEOMYELITIS, ACUTE: Status: ACTIVE | Noted: 2024-11-17

## 2024-11-18 ENCOUNTER — NON-APPOINTMENT (OUTPATIENT)
Age: 89
End: 2024-11-18

## 2024-11-19 ENCOUNTER — LABORATORY RESULT (OUTPATIENT)
Age: 89
End: 2024-11-19

## 2024-11-19 ENCOUNTER — NON-APPOINTMENT (OUTPATIENT)
Age: 89
End: 2024-11-19

## 2024-11-20 ENCOUNTER — NON-APPOINTMENT (OUTPATIENT)
Age: 89
End: 2024-11-20

## 2024-11-20 PROCEDURE — G0463: CPT

## 2024-11-22 ENCOUNTER — NON-APPOINTMENT (OUTPATIENT)
Age: 89
End: 2024-11-22

## 2024-11-22 DIAGNOSIS — F03.918 UNSPECIFIED DEMENTIA, UNSPECIFIED SEVERITY, WITH OTHER BEHAVIORAL DISTURBANCE: ICD-10-CM

## 2024-11-22 RX ORDER — MORPHINE SULFATE 10 MG/5ML
10 SOLUTION ORAL
Qty: 1 | Refills: 0 | Status: ACTIVE | COMMUNITY
Start: 2024-11-22 | End: 1900-01-01

## 2024-11-22 RX ORDER — LORAZEPAM 2 MG/ML
2 CONCENTRATE ORAL
Qty: 1 | Refills: 0 | Status: ACTIVE | COMMUNITY
Start: 2024-11-22 | End: 1900-01-01

## 2024-11-25 ENCOUNTER — NON-APPOINTMENT (OUTPATIENT)
Age: 89
End: 2024-11-25

## 2024-11-26 ENCOUNTER — APPOINTMENT (OUTPATIENT)
Dept: WOUND CARE | Facility: HOSPITAL | Age: 89
End: 2024-11-26
Payer: MEDICARE

## 2024-11-26 ENCOUNTER — OUTPATIENT (OUTPATIENT)
Dept: OUTPATIENT SERVICES | Facility: HOSPITAL | Age: 89
LOS: 1 days | End: 2024-11-26
Payer: MEDICARE

## 2024-11-26 DIAGNOSIS — Z96.60 PRESENCE OF UNSPECIFIED ORTHOPEDIC JOINT IMPLANT: Chronic | ICD-10-CM

## 2024-11-26 DIAGNOSIS — L89.626 PRESSURE-INDUCED DEEP TISSUE DAMAGE OF LT HEEL: ICD-10-CM

## 2024-11-26 DIAGNOSIS — L89.154 PRESSURE ULCER OF SACRAL REGION, STAGE 4: ICD-10-CM

## 2024-11-26 DIAGNOSIS — L89.616 PRESSURE-INDUCED DEEP TISSUE DAMAGE OF RT HEEL: ICD-10-CM

## 2024-11-26 DIAGNOSIS — M86.10 OTHER ACUTE OSTEOMYELITIS, UNSPECIFIED SITE: ICD-10-CM

## 2024-11-26 DIAGNOSIS — Z96.652 PRESENCE OF LEFT ARTIFICIAL KNEE JOINT: Chronic | ICD-10-CM

## 2024-11-26 PROCEDURE — G0463: CPT

## 2024-11-26 PROCEDURE — 99213 OFFICE O/P EST LOW 20 MIN: CPT | Mod: 95

## 2024-12-05 DIAGNOSIS — M86.10 OTHER ACUTE OSTEOMYELITIS, UNSPECIFIED SITE: ICD-10-CM

## 2025-07-11 NOTE — ED ADULT NURSE NOTE - CAS DISCH CONDITION
Copied from CRM #[id number]. Topic: MW Medication/Rx - MW Rx Refill  >> Jul 11, 2025  2:21 PM Hyacinth CLAROS wrote:  Bevely called to request a medication refill and has meds for the next 2-3 business days    New Request/No Encounter found   >ADDED NOTE / CREATED CUSTOM CLINICAL CALL  >Reason for call 'Refill Request'  >Sent for Med Refill support.--DO NOT REPLY - Sent from PACT - If sent to wrong pool, reroute to P ECO Reroute pool --    Message Type:  Refill Medication   Is the medication pended:Yes  Medication name: blood glucose test strip  Message: Patient has 5 days left   Preferred pharmacy verified, and selected.  [id number] - [facility] [address]  Call Back #: [phone]   Can a detailed message be left?  Yes - Voicemail   Is the patient OUT of Medication?  No : route as Routine priority according to KB. Patient has been advised the message will be reviewed within 2-3 business days.   Stable